# Patient Record
(demographics unavailable — no encounter records)

---

## 2016-12-28 NOTE — EMERGENCY ROOM REPORT
History of Present Illness


General


Chief Complaint:  shortness of breath


Source:  Family Member





Present Illness


HPI


Patient is a 86-year-old female who presented after increased fever difficulty 

breathing.  Patient gradual onset of symptoms of the past 2 hours.  Patient had 

a productive cough.  Patient recently been discharged from the hospital after 

pneumonia.  Patient prior history of IVC filter placement.  She's not currently 

taking anticoagulation.  Patient was noted to have increased difficulty 

breathing was brought back to the hospital.Per family she does not have a 

primary care Dr.Cough is reportedly productive nature.


Allergies:  


Coded Allergies:  


     No Known Allergies (Unverified , 10/4/16)





Patient History


Reviewed Nursing Documentation:  PMH: Agreed, PSxH: Agreed





Nursing Documentation-PMH


Hx Cardiac Problems:  Yes


Hx Hypertension:  Yes


Hx Cancer:  No


Hx Gastrointestinal Problems:  Yes


Hx Neurological Problems:  Yes - ANEURYSM BRAIN


Hx Dementia:  Yes





Review of Systems


All Other Systems:  negative except mentioned in HPI





Physical Exam


General Appearance:  moderate distress, lethargic


ENT:  other - dark fluid in oropharynx


Neck:  supple


Respiratory:  chest non-tender, lungs clear, normal breath sounds


Cardiovascular #1:  regular rate, rhythm, no edema


Gastrointestinal:  non tender, soft, no mass


Musculoskeletal:  back normal, digits/nails normal


Neurologic:  alert, oriented x3, responsive


Psychiatric:  judgement/insight normal


Skin:  normal color, no rash, warm/dry





Medical Decision Making


Diagnostic Impression:  


 Primary Impression:  


 HCAP (healthcare-associated pneumonia)


 Additional Impression:  


 Sepsis


ER Course


Patient presented for shortness of breath.Differential included but was not 

limited to anemia, pneumonia, pneumothorax, myocardial infarction, pericardial 

effusion, congestive heart failure, acidosis.  Because of complexity of patient'

s case laboratory testing and imaging studies were ordered.


The laboratory studies showed normal white blood count as well as normal 

lactate.  Patient appears to have pneumonia.  The patient may have some GI 

bleeding and she did have some dark material in her oropharynx.  Dr. Jermaine Milligan was contacted for inpatient management





Labs








Test


  12/28/16


22:30


 


White Blood Count


  6.9 K/UL


(4.8-10.8)


 


Red Blood Count


  4.68 M/UL


(4.20-5.40)


 


Hemoglobin


  13.6 G/DL


(12.0-16.0)


 


Hematocrit


  43.5 %


(37.0-47.0)


 


Mean Corpuscular Volume 93 FL (80-99) 


 


Mean Corpuscular Hemoglobin


  29.0 PG


(27.0-31.0)


 


Mean Corpuscular Hemoglobin


Concent 31.2 G/DL


(32.0-36.0)


 


Red Cell Distribution Width


  13.6 %


(11.6-14.8)


 


Platelet Count


  149 K/UL


(150-450)


 


Mean Platelet Volume


  9.0 FL


(6.5-10.1)


 


Neutrophils (%) (Auto)


  65.3 %


(45.0-75.0)


 


Lymphocytes (%) (Auto)


  16.9 %


(20.0-45.0)


 


Monocytes (%) (Auto)


  12.9 %


(1.0-10.0)


 


Eosinophils (%) (Auto)


  3.1 %


(0.0-3.0)


 


Basophils (%) (Auto)


  1.8 %


(0.0-2.0)


 


Prothrombin Time


  11.1 SEC


(9.30-11.50)


 


Prothromb Time International


Ratio 1.1 (0.9-1.1) 


 


 


Activated Partial


Thromboplast Time 26 SEC (23-33) 


 


 


Sodium Level


  141 mEQ/L


(135-145)


 


Potassium Level


  4.4 mEQ/L


(3.4-4.9)


 


Chloride Level


  100 mEQ/L


()


 


Carbon Dioxide Level


  29 mEQ/L


(20-30)


 


Anion Gap 12 (5-15) 


 


Blood Urea Nitrogen


  10 mg/dL


(7-23)


 


Creatinine


  0.8 mg/dL


(0.5-0.9)


 


Estimat Glomerular Filtration


Rate  mL/min (>60) 


 


 


Glucose Level


  107 mg/dL


()


 


Lactic Acid Level


  1.10 mmol/L


(0.66-2.22)


 


Calcium Level


  9.1 mg/dL


(8.6-10.2)


 


Total Bilirubin


  0.3 mg/dL


(0.0-1.2)


 


Aspartate Amino Transf


(AST/SGOT) 18 U/L (5-40) 


 


 


Alanine Aminotransferase


(ALT/SGPT) 11 U/L (3-33) 


 


 


Alkaline Phosphatase


  85 U/L


()


 


Total Creatine Kinase


  14 U/L


()


 


Creatine Kinase MB


  < 1.5 ng/mL (<


3.8)


 


Creatine Kinase MB Relative


Index  


 


 


Troponin I


  < 0.30 ng/mL


(<=0.30)


 


Total Protein


  7.2 g/dL


(6.6-8.7)


 


Albumin


  3.7 g/dL


(3.5-5.2)


 


Globulin 3.5 g/dL 


 


Albumin/Globulin Ratio 1.0 (1.0-2.7) 








EKG Diagnostic Results


Rate:  normal


Rhythm:  NSR


ST Segments:  no acute changes





Rhythm Strip Diag. Results


EP Interpretation:  yes


Rhythm:  NSR, no PVC's, no ectopy


Status:  unchanged


Disposition:  ADMITTED AS INPATIENT











GilbertoKendell Dec 28, 2016 21:28

## 2016-12-29 NOTE — CARDIAC ELECTROPHYSIOLOGY PN
Subjective


Subjective


[id number]





Objective





Last 24 Hour Vital Signs








  Date Time  Temp Pulse Resp B/P Pulse Ox O2 Delivery O2 Flow Rate FiO2


 


12/29/16 16:17      Nasal Cannula 2.0 28 12/29/16 16:13 98.2 84 21 135/73 93 Room Air 3.0 


 


12/29/16 16:00  81      


 


12/29/16 12:00 97.7 74 24 141/81 94 Nasal Cannula 2.0 28 12/29/16 10:34 97.7       


 


12/29/16 09:16    143/79    


 


12/29/16 08:00  67      


 


12/29/16 08:00 101.7 82 30 160/91 92 Nasal Cannula 2.0 


 


12/29/16 07:55     97 Nasal Cannula 2.0 28 12/29/16 07:55  76 18   Nasal Cannula 2.0 28 12/29/16 04:38 98.2 75 30 143/79 93 Nasal Cannula 2.0 


 


12/29/16 03:56  72      


 


12/29/16 01:57 98.2 74 28 144/92 94 Nasal Cannula 2.0 


 


12/29/16 01:51  77      


 


12/29/16 01:35 100.1 76 29 133/70 95 Nasal Cannula 2.0 


 


12/29/16 01:34 100.1 76 29 165/84 95 Nasal Cannula 2.0 


 


12/29/16 00:07 100.1 76 29 165/84 95 Nasal Cannula 2.0 


 


12/28/16 23:10  75 34   Nasal Cannula 3.0 


 


12/28/16 23:08 100.1 75 34 153/74 96 Nasal Cannula 3.0 


 


12/28/16 21:18 100.2 84 38 172/74 89 Nasal Cannula 2.0 

















Intake and Output  


 


 12/28/16 12/29/16





 19:00 07:00


 


Intake Total  1100 ml


 


Balance  1100 ml


 


  


 


Intake IV Total  1100 ml


 


# Voids  2


 


# Bowel Movements  1











Laboratory Tests








Test


  12/28/16


22:30 12/29/16


16:15


 


White Blood Count


  6.9 K/UL


(4.8-10.8) 


 


 


Red Blood Count


  4.68 M/UL


(4.20-5.40) 


 


 


Hemoglobin


  13.6 G/DL


(12.0-16.0) 


 


 


Hematocrit


  43.5 %


(37.0-47.0) 


 


 


Mean Corpuscular Volume 93 FL (80-99)   


 


Mean Corpuscular Hemoglobin


  29.0 PG


(27.0-31.0) 


 


 


Mean Corpuscular Hemoglobin


Concent 31.2 G/DL


(32.0-36.0)  L 


 


 


Red Cell Distribution Width


  13.6 %


(11.6-14.8) 


 


 


Platelet Count


  149 K/UL


(150-450)  L 


 


 


Mean Platelet Volume


  9.0 FL


(6.5-10.1) 


 


 


Neutrophils (%) (Auto)


  65.3 %


(45.0-75.0) 


 


 


Lymphocytes (%) (Auto)


  16.9 %


(20.0-45.0)  L 


 


 


Monocytes (%) (Auto)


  12.9 %


(1.0-10.0)  H 


 


 


Eosinophils (%) (Auto)


  3.1 %


(0.0-3.0)  H 


 


 


Basophils (%) (Auto)


  1.8 %


(0.0-2.0) 


 


 


Prothrombin Time


  11.1 SEC


(9.30-11.50) 


 


 


Prothromb Time International


Ratio 1.1 (0.9-1.1)  


  


 


 


Activated Partial


Thromboplast Time 26 SEC (23-33)


  


 


 


Sodium Level


  141 mEQ/L


(135-145) 


 


 


Potassium Level


  4.4 mEQ/L


(3.4-4.9) 


 


 


Chloride Level


  100 mEQ/L


() 


 


 


Carbon Dioxide Level


  29 mEQ/L


(20-30) 


 


 


Anion Gap 12 (5-15)   


 


Blood Urea Nitrogen


  10 mg/dL


(7-23) 


 


 


Creatinine


  0.8 mg/dL


(0.5-0.9) 


 


 


Estimat Glomerular Filtration


Rate  mL/min (>60)  


  


 


 


Glucose Level


  107 mg/dL


()  H 


 


 


Lactic Acid Level


  1.10 mmol/L


(0.66-2.22) 


 


 


Calcium Level


  9.1 mg/dL


(8.6-10.2) 


 


 


Total Bilirubin


  0.3 mg/dL


(0.0-1.2) 


 


 


Aspartate Amino Transf


(AST/SGOT) 18 U/L (5-40)  


  


 


 


Alanine Aminotransferase


(ALT/SGPT) 11 U/L (3-33)  


  


 


 


Alkaline Phosphatase


  85 U/L


() 


 


 


Total Creatine Kinase


  14 U/L


()  L 


 


 


Creatine Kinase MB


  < 1.5 ng/mL (<


3.8) 


 


 


Creatine Kinase MB Relative


Index   


  


 


 


Troponin I


  < 0.30 ng/mL


(<=0.30) 


 


 


Total Protein


  7.2 g/dL


(6.6-8.7) 


 


 


Albumin


  3.7 g/dL


(3.5-5.2) 


 


 


Globulin 3.5 g/dL   


 


Albumin/Globulin Ratio 1.0 (1.0-2.7)   


 


D-Dimer


  


  5730 ng/mL


(<500)  H


 


Pro-B-Type Natriuretic Peptide


  


  719 pg/mL


(0-450)  H

















MALCOLM ZULUAGA Dec 29, 2016 18:12

## 2016-12-29 NOTE — PULMONOLOGY PROGRESS NOTE
Assessment/Plan


Problems:  


(1) HCAP (healthcare-associated pneumonia)


(2) Alzheimer's dementia


(3) HTN (hypertension)


(4) DVT (deep venous thrombosis)


Assessment/Plan


-Optimize pulmonary hygiene/mobilize as tolerated


-Titrate down FiO2 to keep SaO2 > 90%


-PRN nebs


-Continue abx per ID


-F/U Cx and viral panel


-F/U PA/LAT CXR


-Check D-dimer and BNP


-Has IVC filter, continue Hep SQ


-SLP eval, aspiration precautions


-Monitor volumes


-Full code





Subjective


Allergies:  


Coded Allergies:  


     No Known Allergies (Unverified , 10/4/16)


Subjective


PCCM CONSULT





86 F NHR h/o dementia, ? aneurysm, recent DVT S/P IVC f BIB family with several 

days of cough and low grade fevers.  Tm 101.8, no WCt, on 1-2L O2. Cough 

productive of mucoid phlegm, ? had CXR in ED. Got Unasyn in ED, now on CTX per 

ID.  Jeffery puree diet, no N/V/D/C/abdominal pain or urinary complaints.  No H/O 

lung dz





PMH: Dementia, DVT S/P IVC filter, HTN, ? aneurysm, GERD





PTA MEDS: Reviewed





SHx: No T/E/D use





FHx: N/C





ROS: Negative other than HPI





Objective





Last 24 Hour Vital Signs








  Date Time  Temp Pulse Resp B/P Pulse Ox O2 Delivery O2 Flow Rate FiO2


 


12/29/16 12:00 97.7 74 24 141/81 94 Nasal Cannula 2.0 28


 


12/29/16 09:16    143/79    


 


12/29/16 08:00  67      


 


12/29/16 08:00 101.7 82 30 160/91 92 Nasal Cannula 2.0 


 


12/29/16 07:55     97 Nasal Cannula 2.0 28


 


12/29/16 07:55  76 18   Nasal Cannula 2.0 28 12/29/16 04:38 98.2 75 30 143/79 93 Nasal Cannula 2.0 


 


12/29/16 03:56  72      


 


12/29/16 01:57 98.2 74 28 144/92 94 Nasal Cannula 2.0 


 


12/29/16 01:51  77      


 


12/29/16 01:35 100.1 76 29 133/70 95 Nasal Cannula 2.0 


 


12/29/16 01:34 100.1 76 29 165/84 95 Nasal Cannula 2.0 


 


12/29/16 00:07 100.1 76 29 165/84 95 Nasal Cannula 2.0 


 


12/28/16 23:10  75 34   Nasal Cannula 3.0 


 


12/28/16 23:08 100.1 75 34 153/74 96 Nasal Cannula 3.0 


 


12/28/16 21:18 100.2 84 38 172/74 89 Nasal Cannula 2.0 

















Intake and Output  


 


 12/28/16 12/29/16





 19:00 07:00


 


Intake Total  1100 ml


 


Balance  1100 ml


 


  


 


Intake IV Total  1100 ml


 


# Voids  2


 


# Bowel Movements  1








General Appearance:  no acute distress, other - demented female


HEENT:  normocephalic, atraumatic, mucous membranes moist


Respiratory/Chest:  rhonchi - scattered


Cardiovascular:  normal peripheral pulses, normal rate, regular rhythm


Abdomen:  normal bowel sounds, soft, non tender, no organomegaly, non distended


Extremities:  no cyanosis, no clubbing, no edema


Laboratory Tests


12/28/16 22:30: 


White Blood Count 6.9, Red Blood Count 4.68, Hemoglobin 13.6, Hematocrit 43.5, 

Mean Corpuscular Volume 93, Mean Corpuscular Hemoglobin 29.0, Mean Corpuscular 

Hemoglobin Concent 31.2L, Red Cell Distribution Width 13.6, Platelet Count 149L

, Mean Platelet Volume 9.0, Neutrophils (%) (Auto) 65.3, Lymphocytes (%) (Auto) 

16.9L, Monocytes (%) (Auto) 12.9H, Eosinophils (%) (Auto) 3.1H, Basophils (%) (

Auto) 1.8, Prothrombin Time 11.1, Prothromb Time International Ratio 1.1, 

Activated Partial Thromboplast Time 26, Sodium Level 141, Potassium Level 4.4, 

Chloride Level 100, Carbon Dioxide Level 29, Anion Gap 12, Blood Urea Nitrogen 

10, Creatinine 0.8, Estimat Glomerular Filtration Rate , Glucose Level 107H, 

Lactic Acid Level 1.10, Calcium Level 9.1, Total Bilirubin 0.3, Aspartate Amino 

Transf (AST/SGOT) 18, Alanine Aminotransferase (ALT/SGPT) 11, Alkaline 

Phosphatase 85, Total Creatine Kinase 14L, Creatine Kinase MB < 1.5, Creatine 

Kinase MB Relative Index , Troponin I < 0.30, Total Protein 7.2, Albumin 3.7, 

Globulin 3.5, Albumin/Globulin Ratio 1.0





Current Medications








 Medications


  (Trade)  Dose


 Ordered  Sig/Toribio


 Route


 PRN Reason  Start Time


 Stop Time Status Last Admin


Dose Admin


 


 Acetaminophen 650


 mg  650 mg  Q4H  PRN


 RECTAL


 Fever/Headache/Mild Pain  12/29/16 09:30


 1/28/17 09:29  12/29/16 10:04


 


 


 Acetaminophen/


 Hydrocodone Bitart


  (Norco 10/325)  1 ea  Q6H  PRN


 ORAL


 Pain Scale (6-10)  12/29/16 01:45


 1/5/17 01:44   


 


 


 Albuterol/


 Ipratropium


  (DuoNeb


 0.5-3(2.5)mg/3ml)  3 ml  Q4H  PRN


 HHN


 Shortness of Breath  12/29/16 01:45


 1/3/17 01:44   


 


 


 Ceftriaxone


 Sodium/Dextrose


  (Rocephin/D5W


 50ml)  50 ml @ 


 100 mls/hr  Q24H


 IVPB


   12/29/16 14:00


 1/5/17 13:59   


 


 


 Clonidine HCl


  (Catapres)  0.1 mg  Q6H  PRN


 ORAL


 For High Blood Pressure  12/29/16 01:45


 1/28/17 01:44   


 


 


 Dextrose


  (Dextrose 50%)    STAT  PRN


 IV


 Hypoglycemia  12/29/16 01:45


 1/28/17 01:44   


 


 


 Diphenhydramine


 HCl


  (Benadryl)  25 mg  Q6H  PRN


 ORAL


 Itching/Pruritis  12/29/16 01:45


 1/28/17 01:44   


 


 


 Docusate Sodium


  (Colace)  100 mg  DAILY


 ORAL


   12/29/16 09:00


 1/28/17 08:59  12/29/16 09:16


 


 


 Heparin Sodium


  (Porcine)


  (Heparin 5000


 units/ml)  5,000 units  EVERY 12  HOURS


 SUBQ


   12/29/16 09:00


 1/28/17 08:59  12/29/16 09:18


 


 


 Lorazepam


  (Ativan 2mg/ml


 1ml)  1 mg  Q4H  PRN


 IV


 For Anxiety  12/29/16 13:45


 1/5/17 13:44  12/29/16 13:59


 


 


 Lorazepam


  (Ativan)  1 mg  Q8H  PRN


 ORAL


 For Anxiety  12/29/16 01:45


 1/5/17 01:44   


 


 


 Losartan Potassium


  (Cozaar)  50 mg  Q12HR


 ORAL


   12/29/16 09:00


 1/28/17 08:59  12/29/16 09:16


 


 


 Pantoprazole


  (Protonix)  40 mg  Q12H  PRN


 ORAL


 Abdominal cramps  12/29/16 01:45


 1/28/17 01:44   


 

















SEJAL DEJESUS M.D. Dec 29, 2016 14:57

## 2016-12-29 NOTE — WOUND CARE CONSULTATION
Wound Assessment


Wound Assessment #1:  


   Wound Number:  #1


   Wound Present on Admission:  Yes


   New Wound:  No


   Status Change of Wound:  No


   Wound Location Body Site:  perineal area - extending to perianal


   Wound Type:  chemical burn


   Gagan Test:  Does not Gagan


   Percent of Wound Pink/Red:  100


   Wound Drainage Amount:  None


   Wound Drainage Odor:  None/Absent


   Tissue Surrounding Wound:  Macerated


   Wound General Appearance:  Reddened, Open to air


Wound Assessment #2:  


   Wound Number:  #2


   Wound Present on Admission:  Yes


   New Wound:  No


   Status Change of Wound:  No


   Wound Location Body Site Modif:  right


   Wound Location Body Site:  buttocks


   Wound Type:  pressure ulcer


   Gagan Test:  Does not Gagan


   Pressure Ulcer Stage:  deep tissue injury


   Wound Thickness:  Full Thickness


   Wound Length:  1.0


   Wound Width:  1.0


   Wound Depth:  utd


   Percent of Wound Purple/Maroon:  100


   Wound Drainage Amount:  None


   Wound Drainage Odor:  None/Absent


   Tissue Surrounding Wound:  Erythemic


   Wound General Appearance:  Reddened


Wound Assessment #3:  


   Wound Number:  #3


   Wound Present on Admission:  Yes


   New Wound:  No


   Status Change of Wound:  No


   Wound Location Body Site Modif:  mid


   Wound Location Body Site:  sacral


   Wound Type:  pressure ulcer


   Gagan Test:  Does not Gagan


   Pressure Ulcer Stage:  I


   Wound Thickness:  Partial Thickness


   Wound Length:  4.0


   Wound Width:  4.0


   Percent of Wound Pink/Red:  100


   Wound Drainage Amount:  None


   Wound Drainage Odor:  None/Absent


   Tissue Surrounding Wound:  Erythemic


   Wound General Appearance:  Reddened


Wound Assessment #4:  


   Wound Number:  #4


   Wound Present on Admission:  Yes


   New Wound:  No


   Status Change of Wound:  No


   Wound Location Body Site Modif:  left


   Wound Location Body Site:  thigh - upper inner


   Wound Type:  chemical burn


   Gagan Test:  Does not Gagan


   Percent of Wound Pink/Red:  100


   Wound Drainage Amount:  None


   Wound Drainage Odor:  None/Absent


   Tissue Surrounding Wound:  Macerated


   Wound General Appearance:  Reddened


Wound Assessment #5:  


   Wound Present on Admission:  Yes


   New Wound:  No


   Status Change of Wound:  No


   Wound Location Body Site Modif:  right


   Wound Location Body Site:  thigh - upper inner


   Wound Type:  pressure ulcer


   Gagan Test:  Does not Gagan


   Percent of Wound Pink/Red:  100


   Wound Drainage Amount:  None


   Wound Drainage Odor:  None/Absent


   Tissue Surrounding Wound:  Macerated


   Wound General Appearance:  Reddened


Wound Comment


#1 Perineal area extending to perianal chemical burn.


#2 Right inner thigh Chemical Burn.


#3 Left inner thigh Chemical Burn.


#4 Mid Sacral Pressure Ulcer stage I.


#5 Right Buttock Pressure Ulcer Deep Tissue Injury.





Recommendation.


-Local wound care as ordered.


-Low air loss Overlay.


-Keep Clean and dry.


-Gentle pericare as needed.


-Turn and reposition.


-Assess and notify MD if any changes are noted.


-Optimize Nutrition.











VEDA FLOWERS Dec 29, 2016 10:26

## 2016-12-29 NOTE — DIAGNOSTIC IMAGING REPORT
Indication: COPD, shortness of breath



Technique: One view of the chest



Comparison: 12/28/2016



Findings: Bilateral chronic appearing interstitial disease is again demonstrated. No

new infiltrates. Heart size is normal. Aorta is tortuous and calcified. Findings 

are

unchanged



Impression:  Unchanged, over one day, findings as above.

## 2016-12-29 NOTE — HISTORY AND PHYSICAL
History of Present Illness


General


Date patient seen:  Dec 29, 2016


Reason for Hospitalization:  Dyspnea/Respdistress





Present Illness


HPI


The patient is unable to provide any history at this time due to her mental 

state, sitter at bedside who reported pt being agitated throughout the day, 

history is therefore obtained from ED record, according to record, patient is 

an 86-year-old female, who presented from home with fever, difficulty breathing 

and productive cough.  Patient was recently discharged from the hospital after 

being treated for pneumonia.  History includes bilateral DVTs of the lower 

extremities of the popliteal veins, status post IVC filter placement, most 

recently had been on Coumadin, cataract surgery, dysphagia,CVA, brain aneurysm, 

dementia, HTN, depression.  Not currently on anticoagulant.


Allergies:  


Coded Allergies:  


     No Known Allergies (Unverified , 10/4/16)





Medication History


Scheduled


Cephalexin* (Cephalexin*), 500 MG ORAL BID, (Reported)


Docusate Sodium* (Colace*), 100 MG ORAL DAILY, (Reported)


Losartan Potassium* (Losartan Potassium*), 50 MG ORAL Q12HR, (Reported)


Potassium Chloride (Potassium Chloride), 20 MEQ PO BID, (Reported)





Scheduled PRN


Acetaminophen* (Tylenol Extra Strength*), 650 MG ORAL Q4HR PRN for Mild Pain (

Pain Scale 1-3), (Reported)


Albuterol Sulfate* (Albuterol Sulfate Hhn*), 3 ML INH Q4H PRN for Shortness of 

Breath, (Reported)


Clonidine Hcl (Clonidine Hcl), 0.1 MG PO PRN PRN for For High Blood Pressure, (

Reported)


Diphenhydramine HCl (Benadryl), 25 MG PO PRN PRN for Itching/Pruritis, (Reported

)


Hydrocodone Bit/Acetaminophen * (Norco *), 1 TAB ORAL Q8HR PRN for 

Pain Scale (6-10), (Reported)


Lorazepam* (Ativan*), 1 MG ORAL Q8HR PRN for For Anxiety, (Reported)


Pantoprazole* (Protonix*), 40 MG ORAL Q12HR PRN for Abdominal cramps, (Reported)





Patient History


Limited by:  medical condition


Healthcare decision maker


Zoe Cunha (daughter)


Resuscitation status


Full Code


Advanced Directive on File


No





Past Medical/Surgical History


Past Medical/Surgical History:  


(1) History of cataract surgery


(2) Dysphagia


(3) CVA (cerebral vascular accident)


(4) Brain aneurysm


(5) Depression


(6) HTN (hypertension)


(7) Alzheimer's dementia


(8) HCAP (healthcare-associated pneumonia)





Family History


Family History:  


Patient reports no known family medical history.





Social History


Social History:  


(1) Non-smoker


(2) No history of alcohol use


(3) No illicit drug use





Review of Systems


All Other Systems:  negative except mentioned in HPI





Physical Exam


General Appearance:  alert, confused


Lines, tubes and drains:  peripheral


HEENT:  normocephalic, atraumatic


Neck:  normal alignment, supple, normal inspection


Respiratory/Chest:  decreased breath sounds


Cardiovascular/Chest:  normal rate, regular rhythm, no JVD


Abdomen:  non tender, soft, no organomegaly, no mass


Extremities:  normal range of motion, non-tender, normal inspection, no calf 

tenderness


Skin Exam:  normal pigmentation, warm/dry


Neurologic:  alert, disoriented





Last 24 Hour Vital Signs








  Date Time  Temp Pulse Resp B/P Pulse Ox O2 Delivery O2 Flow Rate FiO2


 


12/29/16 18:56     95 Nasal Cannula 2.0 28 12/29/16 18:56  88 18   Nasal Cannula 2.0 28 12/29/16 18:56      Nasal Cannula 2.0 28 12/29/16 16:17      Nasal Cannula 2.0 28 12/29/16 16:13 98.2 84 21 135/73 93 Room Air 3.0 


 


12/29/16 16:00  81      


 


12/29/16 12:00 97.7 74 24 141/81 94 Nasal Cannula 2.0 28 12/29/16 10:34 97.7       


 


12/29/16 09:16    143/79    


 


12/29/16 08:00  67      


 


12/29/16 08:00 101.7 82 30 160/91 92 Nasal Cannula 2.0 


 


12/29/16 07:55     97 Nasal Cannula 2.0 28 12/29/16 07:55  76 18   Nasal Cannula 2.0 28 12/29/16 04:38 98.2 75 30 143/79 93 Nasal Cannula 2.0 


 


12/29/16 03:56  72      


 


12/29/16 01:57 98.2 74 28 144/92 94 Nasal Cannula 2.0 


 


12/29/16 01:51  77      


 


12/29/16 01:35 100.1 76 29 133/70 95 Nasal Cannula 2.0 


 


12/29/16 01:34 100.1 76 29 165/84 95 Nasal Cannula 2.0 


 


12/29/16 00:07 100.1 76 29 165/84 95 Nasal Cannula 2.0 


 


12/28/16 23:10  75 34   Nasal Cannula 3.0 


 


12/28/16 23:08 100.1 75 34 153/74 96 Nasal Cannula 3.0 


 


12/28/16 21:18 100.2 84 38 172/74 89 Nasal Cannula 2.0 

















Intake and Output  


 


 12/28/16 12/29/16





 19:00 07:00


 


Intake Total  1100 ml


 


Balance  1100 ml


 


  


 


IV Total  1100 ml


 


# Voids  2


 


# Bowel Movements  1











Laboratory Tests








Test


  12/28/16


22:30 12/29/16


16:15


 


White Blood Count


  6.9 K/UL


(4.8-10.8) 


 


 


Red Blood Count


  4.68 M/UL


(4.20-5.40) 


 


 


Hemoglobin


  13.6 G/DL


(12.0-16.0) 


 


 


Hematocrit


  43.5 %


(37.0-47.0) 


 


 


Mean Corpuscular Volume 93 FL (80-99)   


 


Mean Corpuscular Hemoglobin


  29.0 PG


(27.0-31.0) 


 


 


Mean Corpuscular Hemoglobin


Concent 31.2 G/DL


(32.0-36.0)  L 


 


 


Red Cell Distribution Width


  13.6 %


(11.6-14.8) 


 


 


Platelet Count


  149 K/UL


(150-450)  L 


 


 


Mean Platelet Volume


  9.0 FL


(6.5-10.1) 


 


 


Neutrophils (%) (Auto)


  65.3 %


(45.0-75.0) 


 


 


Lymphocytes (%) (Auto)


  16.9 %


(20.0-45.0)  L 


 


 


Monocytes (%) (Auto)


  12.9 %


(1.0-10.0)  H 


 


 


Eosinophils (%) (Auto)


  3.1 %


(0.0-3.0)  H 


 


 


Basophils (%) (Auto)


  1.8 %


(0.0-2.0) 


 


 


Prothrombin Time


  11.1 SEC


(9.30-11.50) 


 


 


Prothromb Time International


Ratio 1.1 (0.9-1.1)  


  


 


 


Activated Partial


Thromboplast Time 26 SEC (23-33)


  


 


 


Sodium Level


  141 mEQ/L


(135-145) 


 


 


Potassium Level


  4.4 mEQ/L


(3.4-4.9) 


 


 


Chloride Level


  100 mEQ/L


() 


 


 


Carbon Dioxide Level


  29 mEQ/L


(20-30) 


 


 


Anion Gap 12 (5-15)   


 


Blood Urea Nitrogen


  10 mg/dL


(7-23) 


 


 


Creatinine


  0.8 mg/dL


(0.5-0.9) 


 


 


Estimat Glomerular Filtration


Rate  mL/min (>60)  


  


 


 


Glucose Level


  107 mg/dL


()  H 


 


 


Lactic Acid Level


  1.10 mmol/L


(0.66-2.22) 


 


 


Calcium Level


  9.1 mg/dL


(8.6-10.2) 


 


 


Total Bilirubin


  0.3 mg/dL


(0.0-1.2) 


 


 


Aspartate Amino Transf


(AST/SGOT) 18 U/L (5-40)  


  


 


 


Alanine Aminotransferase


(ALT/SGPT) 11 U/L (3-33)  


  


 


 


Alkaline Phosphatase


  85 U/L


() 


 


 


Total Creatine Kinase


  14 U/L


()  L 


 


 


Creatine Kinase MB


  < 1.5 ng/mL (<


3.8) 


 


 


Creatine Kinase MB Relative


Index   


  


 


 


Troponin I


  < 0.30 ng/mL


(<=0.30) 


 


 


Total Protein


  7.2 g/dL


(6.6-8.7) 


 


 


Albumin


  3.7 g/dL


(3.5-5.2) 


 


 


Globulin 3.5 g/dL   


 


Albumin/Globulin Ratio 1.0 (1.0-2.7)   


 


D-Dimer


  


  5730 ng/mL


(<500)  H


 


Pro-B-Type Natriuretic Peptide


  


  719 pg/mL


(0-450)  H








Height (Feet):  5


Height (Inches):  6.00


Weight (Pounds):  140


Medications





Current Medications








 Medications


  (Trade)  Dose


 Ordered  Sig/Toribio


 Route


 PRN Reason  Start Time


 Stop Time Status Last Admin


Dose Admin


 


 Acetaminophen 650


 mg  650 mg  Q4H  PRN


 RECTAL


 Fever/Headache/Mild Pain  12/29/16 09:30


 1/28/17 09:29  12/29/16 10:04


 


 


 Acetaminophen/


 Hydrocodone Bitart


  (Norco 10/325)  1 ea  Q6H  PRN


 ORAL


 Pain Scale (6-10)  12/29/16 01:45


 1/5/17 01:44   


 


 


 Albuterol/


 Ipratropium


  (DuoNeb


 0.5-3(2.5)mg/3ml)  3 ml  Q4H  PRN


 HHN


 Shortness of Breath  12/29/16 01:45


 1/3/17 01:44   


 


 


 Ceftriaxone


 Sodium/Dextrose


  (Rocephin/D5W


 50ml)  50 ml @ 


 100 mls/hr  Q24H


 IVPB


   12/29/16 14:00


 1/5/17 13:59  12/29/16 15:15


 


 


 Clonidine HCl


  (Catapres)  0.1 mg  Q6H  PRN


 ORAL


 For High Blood Pressure  12/29/16 01:45


 1/28/17 01:44   


 


 


 Dextrose


  (Dextrose 50%)    STAT  PRN


 IV


 Hypoglycemia  12/29/16 01:45


 1/28/17 01:44   


 


 


 Diphenhydramine


 HCl


  (Benadryl)  25 mg  Q6H  PRN


 ORAL


 Itching/Pruritis  12/29/16 01:45


 1/28/17 01:44   


 


 


 Docusate Sodium


  (Colace)  100 mg  DAILY


 ORAL


   12/29/16 09:00


 1/28/17 08:59  12/29/16 09:16


 


 


 Guaifenesin


  (Mucinex)  600 mg  TWICE A  DAY


 ORAL


   12/29/16 18:00


 1/28/17 17:59  12/29/16 17:26


 


 


 Guaifenesin


  (Robitussin)  100 mg  Q4H  PRN


 ORAL


 For Cough  12/29/16 15:00


 1/28/17 14:59   


 


 


 Heparin Sodium


  (Porcine)


  (Heparin 5000


 units/ml)  5,000 units  EVERY 12  HOURS


 SUBQ


   12/29/16 09:00


 1/28/17 08:59  12/29/16 09:18


 


 


 Lorazepam


  (Ativan 2mg/ml


 1ml)  1 mg  Q4H  PRN


 IV


 For Anxiety  12/29/16 13:45


 1/5/17 13:44  12/29/16 18:56


 


 


 Lorazepam


  (Ativan)  1 mg  Q8H  PRN


 ORAL


 For Anxiety  12/29/16 01:45


 1/5/17 01:44   


 


 


 Losartan Potassium


  (Cozaar)  50 mg  Q12HR


 ORAL


   12/29/16 09:00


 1/28/17 08:59  12/29/16 09:16


 


 


 Pantoprazole


  (Protonix)  40 mg  Q12H  PRN


 ORAL


 Abdominal cramps  12/29/16 01:45


 1/28/17 01:44   


 








Objective Narrative


XRAY Chest 1v





Indication: COPD, shortness of breath





Technique: One view of the chest





Comparison: 12/28/2016





Findings: Bilateral chronic appearing interstitial disease is again 

demonstrated. No


new infiltrates. Heart size is normal. Aorta is tortuous and calcified. 

Findings 


are


unchanged





Impression:  Unchanged, over one day





Assessment/Plan


Problem List:  


(1) Dyspnea


ICD Codes:  R06.00 - Dyspnea, unspecified


SNOMED:  364868104


(2) Respiratory distress


ICD Codes:  R06.00 - Dyspnea, unspecified


SNOMED:  246141677


(3) Congestive heart failure (CHF)


ICD Codes:  I50.9 - Heart failure, unspecified


SNOMED:  83389705


Assessment/Plan


Pulmonary hygiene/mobilize per pulmo


Continue neb prn and abx


Continue abx per ID


Hematology consult, will follow up with rec


Cardiology consult


DVT prophylaxis with Hep SQ


Aspiration precautions


Monitor volumes


Chest CT angiogram to rule out pulmonary embolism 

















Alisia Oakes N.P. Dec 29, 2016 21:10

## 2016-12-29 NOTE — CONSULTATION
DATE OF CONSULTATION:



INFECTIOUS DISEASE CONSULTATION



REFERRING PHYSICIAN:  Jermaine Milligan M.D.



REASON FOR CONSULTATION:  Evaluation of the patient for pneumonia,

antibiotic management.



HISTORY OF PRESENT ILLNESS:  The patient is an 86-year-old female

with multiple medical problems as listed below, who was admitted to this

medical center for shortness of breath.



The patient also has been recently admitted to this medical center due

to E. coli and klebsiella urinary tract infection.  The patient overall is

a poor historian.  This admission the patient was admitted for fever and

pneumonia.  Infectious Disease consultation has been requested for further

evaluation of the patient's antibiotic management.



PAST MEDICAL HISTORY:

1. History of cataract surgery.

2. History of dysphagia.

3. History of CVA.

4. History of brain aneurysm.

5. Dementia.

6. Hypertension.

7. History of urinary tract infection.

8. Depression.

9. Anxiety.



MEDICATIONS:  The patient received one dose of intravenous Unasyn in

the emergency room.



ALLERGIES:  No known drug allergies.



FAMILY HISTORY:  Unavailable.



REVIEW OF SYSTEMS:  Limited, much of the information gathered as

mentioned above.



PHYSICAL EXAMINATION:

VITAL SIGNS:  Temperature 101.7 degrees, pulse 86, respiratory rate

18, and blood pressure 160/91.

HEENT:  Mild pale conjunctivae.

NECK:  No lymphadenopathy.

CHEST:  Coarse breathing sounds.

HEART:  S1 and S2.

ABDOMEN:  Soft and nontender.

EXTREMITIES:  No cyanosis.

NEUROLOGIC:  Awake.  Poor historian.



LABORATORY AND DIAGNOSTIC DATA:  White blood cell 6.9, hemoglobin 13,

and platelet 149,000.  Urinalysis 20-30 white blood cells.  BUN 10 and

creatinine 0.8.  ALT, AST, and alkaline phosphatase within normal range.

Blood culture is pending.  Urine is pending.  Chest x-ray and laboratories

pending.



ASSESSMENT:  The patient is an 86-year-old female with multiple

medical problems as listed above, who came to the hospital with shortness

of breath and fever.  Chest x-ray result is pending, however, the

patient's urinalysis showed significant pyuria.  The patient may have

underlying urinary tract infection.  Also underlying pneumonia is a

consideration.  The patient would benefit from empiric antibiotic

treatment till we get further information from cultures.



PLAN:

1. We will start the patient on IV Rocephin.

2. Monitor blood cultures (blood, urine and sputum).

3. Chest x-ray.

4. Monitor CBC and BMP.



Monitor the patient's clinical course and labs and based on those, we

will do further recommendations.



Thank you for this consultation.  I will follow the patient during this

hospitalization.









  ______________________________________________

  Juan Alberto Levy M.D.





DR:  AMINAH

D:  12/29/2016 12:43

T:  12/29/2016 18:03

JOB#:  [id number]

CC:

## 2016-12-30 NOTE — PULMONOLOGY PROGRESS NOTE
Assessment/Plan


Problems:  


(1) HCAP (healthcare-associated pneumonia)


(2) Alzheimer's dementia


(3) HTN (hypertension)


(4) DVT (deep venous thrombosis)


(5) Gram positive bacterial infection


Assessment & Plan:  ? contaminant





Assessment/Plan


-Optimize pulmonary hygiene/mobilize as tolerated


-PRN O2


-PRN nebs


-Continue abx per ID, F/U repeat CX's


-Monitor volumes


-Has IVC filter, continue Hep SQ


-SLP eval, aspiration precautions


-F/U cards recs and TTE


-Full code





Subjective


Allergies:  


Coded Allergies:  


     No Known Allergies (Unverified , 10/4/16)


Subjective


Tm 98.4, VSS, stable on RA 


Cough better, + SOB, no F/C


D-dimer > 5K --> CT-A: No PE + faint b nodularity and GGO's/poor study with 

lots of artifact





Objective





Last 24 Hour Vital Signs








  Date Time  Temp Pulse Resp B/P Pulse Ox O2 Delivery O2 Flow Rate FiO2


 


12/30/16 12:00  90      


 


12/30/16 08:34    148/91    


 


12/30/16 08:00 98.1 90 19 148/91 94 Room Air  


 


12/30/16 08:00  86      


 


12/30/16 04:00  85      


 


12/30/16 04:00 97.3 82 22 138/97 96 Room Air  


 


12/30/16 00:00  80      


 


12/30/16 00:00 97.6 80 22 138/75 96 Room Air  


 


12/29/16 22:17    142/80    


 


12/29/16 20:00 98.4 87 22 142/80 95 Room Air  


 


12/29/16 18:56     95 Nasal Cannula 2.0 28 12/29/16 18:56  88 18   Nasal Cannula 2.0 28 12/29/16 18:56      Nasal Cannula 2.0 28


 


12/29/16 16:17      Nasal Cannula 2.0 28 12/29/16 16:13 98.2 84 21 135/73 93 Room Air 3.0 


 


12/29/16 16:00  81      

















Intake and Output  


 


 12/29/16 12/30/16





 19:00 07:00


 


Intake Total 290 ml 0 ml


 


Output Total  800 ml


 


Balance 290 ml -800 ml


 


  


 


Intake Oral 240 ml 0 ml


 


IV Total 50 ml 


 


Output Urine Total  800 ml


 


# Bowel Movements 2 2








General Appearance:  no acute distress, cachetic


HEENT:  normocephalic, atraumatic, mucous membranes moist


Respiratory/Chest:  chest wall non-tender, rhonchi - scattered


Cardiovascular:  normal peripheral pulses, normal rate, regular rhythm


Abdomen:  normal bowel sounds, soft, non tender, no organomegaly, non distended


Extremities:  no cyanosis, no clubbing, no edema





Microbiology








 Date/Time


Source Procedure


Growth Status


 


 


 12/28/16 22:45


Blood Blood Culture - Preliminary Resulted


 


 12/28/16 22:30


Blood Blood Culture - Preliminary Resulted





 12/29/16 16:30


Sputum Expectorated Gram Stain - Final Resulted


 


 12/29/16 16:30


Sputum Expectorated Sputum Culture - Preliminary Resulted


 


 12/29/16 16:30


Urine,Clean Catch Urine Culture - Preliminary


NO GROWTH Resulted








Laboratory Tests


12/29/16 16:15: 


D-Dimer 5730H, Pro-B-Type Natriuretic Peptide 719H


12/30/16 03:45: 


Pro-B-Type Natriuretic Peptide 642H, White Blood Count 6.1, Red Blood Count 

4.04L, Hemoglobin 12.1, Hematocrit 37.8, Mean Corpuscular Volume 94, Mean 

Corpuscular Hemoglobin 29.9, Mean Corpuscular Hemoglobin Concent 31.9L, Red 

Cell Distribution Width 13.8, Platelet Count 152, Mean Platelet Volume 8.7, 

Neutrophils (%) (Auto) 63.9, Lymphocytes (%) (Auto) 20.9, Monocytes (%) (Auto) 

11.8H, Eosinophils (%) (Auto) 2.3, Basophils (%) (Auto) 1.2, Sodium Level 138, 

Potassium Level 3.7, Chloride Level 98, Carbon Dioxide Level 26, Anion Gap 14, 

Blood Urea Nitrogen 9, Creatinine 0.6, Estimat Glomerular Filtration Rate , 

Glucose Level 86, Calcium Level 8.7, Troponin I < 0.30, Thyroid Stimulating 

Hormone (TSH) 2.200, Free Thyroxine 1.17





Current Medications








 Medications


  (Trade)  Dose


 Ordered  Sig/Toribio


 Route


 PRN Reason  Start Time


 Stop Time Status Last Admin


Dose Admin


 


 Acetaminophen 650


 mg  650 mg  Q4H  PRN


 RECTAL


 Fever/Headache/Mild Pain  12/29/16 09:30


 1/28/17 09:29  12/29/16 10:04


 


 


 Acetaminophen/


 Hydrocodone Bitart


  (Norco 10/325)  1 ea  Q6H  PRN


 ORAL


 Pain Scale (6-10)  12/29/16 01:45


 1/5/17 01:44   


 


 


 Albuterol/


 Ipratropium


  (DuoNeb


 0.5-3(2.5)mg/3ml)  3 ml  Q4H  PRN


 HHN


 Shortness of Breath  12/29/16 01:45


 1/3/17 01:44   


 


 


 Azithromycin/


 Dextrose


  (Zithromax/D5W


 250ml)  250 ml @ 


 250 mls/hr  Q24HRS


 IVPB


   12/30/16 12:00


 1/5/17 12:59 UNV  


 


 


 Ceftriaxone


 Sodium/Dextrose


  (Rocephin/D5W


 50ml)  50 ml @ 


 100 mls/hr  Q24H


 IVPB


   12/29/16 14:00


 1/5/17 13:59  12/29/16 15:15


 


 


 Clonidine HCl


  (Catapres)  0.1 mg  Q6H  PRN


 ORAL


 For High Blood Pressure  12/29/16 01:45


 1/28/17 01:44   


 


 


 Dextrose


  (Dextrose 50%)    STAT  PRN


 IV


 Hypoglycemia  12/29/16 01:45


 1/28/17 01:44   


 


 


 Diphenhydramine


 HCl


  (Benadryl)  25 mg  Q6H  PRN


 ORAL


 Itching/Pruritis  12/29/16 01:45


 1/28/17 01:44   


 


 


 Docusate Sodium


  (Colace)  100 mg  DAILY


 ORAL


   12/29/16 09:00


 1/28/17 08:59  12/30/16 08:34


 


 


 Guaifenesin


  (Mucinex)  600 mg  TWICE A  DAY


 ORAL


   12/29/16 18:00


 1/28/17 17:59  12/30/16 08:44


 


 


 Guaifenesin


  (Robitussin)  100 mg  Q4H  PRN


 ORAL


 For Cough  12/29/16 15:00


 1/28/17 14:59   


 


 


 Heparin Sodium


  (Porcine)


  (Heparin 5000


 units/ml)  5,000 units  EVERY 12  HOURS


 SUBQ


   12/29/16 09:00


 1/28/17 08:59  12/30/16 08:46


 


 


 Lorazepam


  (Ativan 2mg/ml


 1ml)  1 mg  Q4H  PRN


 IV


 For Anxiety  12/29/16 13:45


 1/5/17 13:44  12/29/16 18:56


 


 


 Lorazepam


  (Ativan)  1 mg  Q8H  PRN


 ORAL


 For Anxiety  12/29/16 01:45


 1/5/17 01:44   


 


 


 Losartan Potassium


  (Cozaar)  50 mg  Q12HR


 ORAL


   12/29/16 09:00


 1/28/17 08:59  12/30/16 08:34


 


 


 Pantoprazole


  (Protonix)  40 mg  Q12H  PRN


 ORAL


 Abdominal cramps  12/29/16 01:45


 1/28/17 01:44   


 


 


 Vancomycin HCl 1


 ea  1 ea  DAILY  PRN


 MISC


 Per rx protocol  12/30/16 12:00


 1/29/17 11:59 SEJAL CHANDRA M.D. Dec 30, 2016 12:52

## 2016-12-30 NOTE — DIAGNOSTIC IMAGING REPORT
ndication: Shortness of breath



Technique: IV administration nonionic contrast. Spiral acquisitions obtained from

the lung bases to the lung apices. Multiplanar and 3-D reconstructions were

generated. Total dose length product 574 mGycm. CTDIvol(s) 12, 12, 12, 12, 12, 12,

17 mGy



Comparison: None



Findings: Exam is limited due to motion artifact. Pulmonary arterial opacification

is adequate. No definite evidence of acute pulmonary embolus demonstrated. No

evidence of thoracic aortic aneurysm or dissection. Normal caliber pulmonary

arteries. Normal heart, no evidence of right ventricular chamber dilatation.



Motion artifact limits assessment of the pulmonary parenchyma. There is 

diffuse

groundglass opacity which is probably exaggerated by the motion artifact. Is there

is bilateral diffuse mild centrilobular interstitial septal thickening, especially

in the lower lobes. There may be some centrilobular micro-nodularity as well 

Some

atelectatic bands are seen at the left lung base. Is questionably a cystic space in

the superior segment of the right lower lobe. No focal dense consolidation 

is

demonstrated. No effusions. A calcified granulomata is seen in the superior segment

of the right lower lobe. There are also calcified granulomatous right hilar 

and

paratracheal lymph nodes.



The heart is normal in size, although there is suggestion of left ventricular

muscular hypertrophy. There are prominent mediastinal lymph nodes, with large 

right

paratracheal node measuring up to 22 mm in diameter. The included thyroid 

is

unremarkable. No axillary or chest wall mass or adenopathy is demonstrated.



There is a mild wedge compression fracture deformity of the T12 vertebral body. 

The

included upper abdominal anatomy is unremarkable.



Impression: Somewhat limited exam, due to respiratory motion artifact. Negative for

acute pulmonary embolus or other acute thoracic vascular pathology



Diffuse ground glass opacity, possibly due to respiratory motion artifact, but

diffuse parenchymal disease such as pulmonary edema not excludable. Correlate 

with

clinical findings



Diffuse bilateral interstitial disease, possibly with micro-nodularity, 

disseminated

inflammatory lesions a possibility.



Evidence of old granulomatous disease



T12 compression fracture deformity, age indeterminate but suspect chronic



This agrees with the preliminary interpretation provided overnight by Dr. Devlin







The CT scanner at [facility] is accredited by the [employer] 

of

Radiology and the scans are performed using protocols designed to limit 

radiation

exposure to as low as reasonably achievable to attain images of sufficient

resolution adequate for diagnostic evaluation.

## 2016-12-30 NOTE — GENERAL PROGRESS NOTE
Assessment/Plan


Problem List:  


(1) Dyspnea


ICD Codes:  R06.00 - Dyspnea, unspecified


SNOMED:  298724831


(2) Respiratory distress


ICD Codes:  R06.00 - Dyspnea, unspecified


SNOMED:  806564087


(3) Congestive heart failure (CHF)


ICD Codes:  I50.9 - Heart failure, unspecified


SNOMED:  23723937


(4) HCAP (healthcare-associated pneumonia)


ICD Codes:  J18.9 - Pneumonia, unspecified organism


SNOMED:  257935763


Status:  stable, progressing


Assessment/Plan


Pulmonary hygiene/mobilize as tolerated per pulmo


Continue abx per ID


Hematology F/U


Has IVC filter, continue lovenox SQ


Aspiration precautions


AM labs





Subjective


ROS Limited/Unobtainable:  Yes


Allergies:  


Coded Allergies:  


     No Known Allergies (Unverified , 10/4/16)


Subjective


Pt's daughter at bedside upset that the pt was discharged last visit without a 

chest xray, she  stated that she had to bring the pt back to the hospital when 

she could not stop coughing at home and was short of breath, stated that she 

was very disappointed with the care





Objective





Last 24 Hour Vital Signs








  Date Time  Temp Pulse Resp B/P Pulse Ox O2 Delivery O2 Flow Rate FiO2


 


12/30/16 16:00 98.6 92 14 138/85 95   


 


12/30/16 16:00  89      


 


12/30/16 12:00 98.2 86 20 130/74 94 Room Air  


 


12/30/16 12:00  90      


 


12/30/16 08:34    148/91    


 


12/30/16 08:00 98.1 90 19 148/91 94 Room Air  


 


12/30/16 08:00  86      


 


12/30/16 06:55     93 Nasal Cannula 2.0 28


 


12/30/16 06:55      Nasal Cannula 2.0 28


 


12/30/16 06:55  94 20   Nasal Cannula 2.0 28


 


12/30/16 04:00  85      


 


12/30/16 04:00 97.3 82 22 138/97 96 Room Air  


 


12/30/16 00:00  80      


 


12/30/16 00:00 97.6 80 22 138/75 96 Room Air  


 


12/29/16 22:17    142/80    


 


12/29/16 20:00 98.4 87 22 142/80 95 Room Air  


 


12/29/16 18:56     95 Nasal Cannula 2.0 28


 


12/29/16 18:56  88 18   Nasal Cannula 2.0 28


 


12/29/16 18:56      Nasal Cannula 2.0 28

















Intake and Output  


 


 12/29/16 12/30/16





 19:00 07:00


 


Intake Total 290 ml 0 ml


 


Output Total  800 ml


 


Balance 290 ml -800 ml


 


  


 


Intake Oral 240 ml 0 ml


 


IV Total 50 ml 


 


Output Urine Total  800 ml


 


# Bowel Movements 2 2








Laboratory Tests


12/30/16 03:45: 


White Blood Count 6.1, Red Blood Count 4.04L, Hemoglobin 12.1, Hematocrit 37.8, 

Mean Corpuscular Volume 94, Mean Corpuscular Hemoglobin 29.9, Mean Corpuscular 

Hemoglobin Concent 31.9L, Red Cell Distribution Width 13.8, Platelet Count 152, 

Mean Platelet Volume 8.7, Neutrophils (%) (Auto) 63.9, Lymphocytes (%) (Auto) 

20.9, Monocytes (%) (Auto) 11.8H, Eosinophils (%) (Auto) 2.3, Basophils (%) (

Auto) 1.2, Sodium Level 138, Potassium Level 3.7, Chloride Level 98, Carbon 

Dioxide Level 26, Anion Gap 14, Blood Urea Nitrogen 9, Creatinine 0.6, Estimat 

Glomerular Filtration Rate , Glucose Level 86, Calcium Level 8.7, Troponin I < 

0.30, Pro-B-Type Natriuretic Peptide 642H, Thyroid Stimulating Hormone (TSH) 

2.200, Free Thyroxine 1.17


Height (Feet):  5


Height (Inches):  6.00


Weight (Pounds):  140


General Appearance:  no apparent distress, confused


EENT:  normal ENT inspection, TMs normal


Neck:  non-tender, normal alignment, supple, normal inspection


Cardiovascular:  normal rate, regular rhythm


Respiratory/Chest:  decreased breath sounds


Abdomen:  normal bowel sounds, non tender, soft, no organomegaly, no mass


Pelvis:  normal external exam


Extremities:  non-tender, normal inspection, no calf tenderness


Neurologic:  disoriented


Skin:  normal pigmentation, warm/dry











Alisia Oakes N.P. Dec 30, 2016 18:26

## 2016-12-30 NOTE — CONSULTATION
DATE OF CONSULTATION:  12/29/2016



CARDIOLOGY CONSULTATION



REASON FOR CONSULTATION:  Evaluation of hypertension and tachycardia,

the patient also is short of breath.



HISTORY OF PRESENT ILLNESS:  The patient is an 86-year-old 

lady with history of hypertension, dementia, and history of DVT status

post IVC filter as well as history of questionable abdominal aortic

aneurysm brought in by family for several days of cough and low-grade

fever.  The patient had temperature of 101.8.  The patient was started on

antibiotic in the emergency room and was admitted to step-down unit for

community-acquired pneumonia.  Cardiology consultation was obtained for

further evaluation and management.  At the time of my evaluation, the

patient is confused, pleasantly but denies any chest pain.



REVIEW OF SYSTEMS:  Negative.



PAST MEDICAL HISTORY:

1. Hypertension.

2. History of brain aneurysm.

3. Dementia.

4. Deep venous thrombosis, status post IVC filter but not on any

anticoagulation.



PHYSICAL EXAMINATION:

VITAL SIGNS:  Blood pressure is 135/73, pulse 84, respirations 20,

and temperature 98.2.

HEAD AND NECK:  Shows no JVD.

LUNGS:  Coarse rhonchi.

CARDIOVASCULAR:  Tachycardic S1 and S2 with no gallop or murmur.

ABDOMEN:  Soft.

EXTREMITIES:  No pitting edema.



LABORATORY DATA:  White count 6.9, hemoglobin 13.6, hematocrit 42.5,

and platelet count 149,000.  Sodium 141, potassium 4.4, BUN of 10,

creatinine 0.8, and glucose of 107.  Troponin is negative.  INR is 1.1.

D-dimer 5730.



ASSESSMENT AND PLAN:

1. Shortness of breath and tachycardia as well as D-dimer more than

5000.  The patient will be undergoing Chest CT angiogram to rule out

pulmonary embolism and was already evaluated by Dr. Heladio Bush.  I

will order an EKG as well as echocardiogram to evaluate for ejection

fraction and wall motion abnormality as well.

2. Hypertension.  Continue Cozaar 50 mg b.i.d. and p.r.n. clonidine.

3. Pneumonia on ceftriaxone and albuterol.



Thank very much, Dr. Milligan, for allowing me to participate in the

care of this patient.  Please do not hesitate to contact for any questions

regarding my evaluation.









  ______________________________________________

  Juan José Saucedo M.D.





DR:  Kedar

D:  12/29/2016 18:14

T:  12/30/2016 00:08

JOB#:  [id number]

CC:

## 2016-12-30 NOTE — CARDIAC ELECTROPHYSIOLOGY PN
Assessment/Plan


Assessment/Plan


1. Shortness of breath and tachycardia as well as D-dimer more than 5000 and 

bilateral LE DVT.  Chest CT angiogram showed no pulmonary embolism  I will 

order an EKG as well as echocardiogram to evaluate for ejection fraction and 

wall motion abnormality as well.


2. Hypertension.  Continue Cozaar 50 mg b.i.d. and p.r.n. clonidine.


3. Pneumonia on ceftriaxone and albuterol.


4. Bilateral LE DVT Heparin drip per Dr. Johnson. S/P IVC filter.





ROMERO RN





Subjective


Subjective


Alert in NAD. No arrhythmia on tele.





Objective





Last 24 Hour Vital Signs








  Date Time  Temp Pulse Resp B/P Pulse Ox O2 Delivery O2 Flow Rate FiO2


 


12/30/16 12:00 98.2 86 20 130/74 94 Room Air  


 


12/30/16 12:00  90      


 


12/30/16 08:34    148/91    


 


12/30/16 08:00 98.1 90 19 148/91 94 Room Air  


 


12/30/16 08:00  86      


 


12/30/16 06:55     93 Nasal Cannula 2.0 28 12/30/16 06:55      Nasal Cannula 2.0 28


 


12/30/16 06:55  94 20   Nasal Cannula 2.0 28


 


12/30/16 04:00  85      


 


12/30/16 04:00 97.3 82 22 138/97 96 Room Air  


 


12/30/16 00:00  80      


 


12/30/16 00:00 97.6 80 22 138/75 96 Room Air  


 


12/29/16 22:17    142/80    


 


12/29/16 20:00 98.4 87 22 142/80 95 Room Air  


 


12/29/16 18:56     95 Nasal Cannula 2.0 28 12/29/16 18:56  88 18   Nasal Cannula 2.0 28 12/29/16 18:56      Nasal Cannula 2.0 28


 


12/29/16 16:17      Nasal Cannula 2.0 28 12/29/16 16:13 98.2 84 21 135/73 93 Room Air 3.0 

















Intake and Output  


 


 12/29/16 12/30/16





 19:00 07:00


 


Intake Total 290 ml 0 ml


 


Output Total  800 ml


 


Balance 290 ml -800 ml


 


  


 


Intake Oral 240 ml 0 ml


 


IV Total 50 ml 


 


Output Urine Total  800 ml


 


# Bowel Movements 2 2











Laboratory Tests








Test


  12/29/16


16:15 12/30/16


03:45


 


D-Dimer


  5730 ng/mL


(<500)  H 


 


 


Pro-B-Type Natriuretic Peptide


  719 pg/mL


(0-450)  H 642 pg/mL


(0-450)  H


 


White Blood Count


  


  6.1 K/UL


(4.8-10.8)


 


Red Blood Count


  


  4.04 M/UL


(4.20-5.40)  L


 


Hemoglobin


  


  12.1 G/DL


(12.0-16.0)


 


Hematocrit


  


  37.8 %


(37.0-47.0)


 


Mean Corpuscular Volume  94 FL (80-99)  


 


Mean Corpuscular Hemoglobin


  


  29.9 PG


(27.0-31.0)


 


Mean Corpuscular Hemoglobin


Concent 


  31.9 G/DL


(32.0-36.0)  L


 


Red Cell Distribution Width


  


  13.8 %


(11.6-14.8)


 


Platelet Count


  


  152 K/UL


(150-450)


 


Mean Platelet Volume


  


  8.7 FL


(6.5-10.1)


 


Neutrophils (%) (Auto)


  


  63.9 %


(45.0-75.0)


 


Lymphocytes (%) (Auto)


  


  20.9 %


(20.0-45.0)


 


Monocytes (%) (Auto)


  


  11.8 %


(1.0-10.0)  H


 


Eosinophils (%) (Auto)


  


  2.3 %


(0.0-3.0)


 


Basophils (%) (Auto)


  


  1.2 %


(0.0-2.0)


 


Sodium Level


  


  138 mEQ/L


(135-145)


 


Potassium Level


  


  3.7 mEQ/L


(3.4-4.9)


 


Chloride Level


  


  98 mEQ/L


()


 


Carbon Dioxide Level


  


  26 mEQ/L


(20-30)


 


Anion Gap  14 (5-15)  


 


Blood Urea Nitrogen


  


  9 mg/dL (7-23)


 


 


Creatinine


  


  0.6 mg/dL


(0.5-0.9)


 


Estimat Glomerular Filtration


Rate 


   mL/min (>60)  


 


 


Glucose Level


  


  86 mg/dL


()


 


Calcium Level


  


  8.7 mg/dL


(8.6-10.2)


 


Troponin I


  


  < 0.30 ng/mL


(<=0.30)


 


Thyroid Stimulating Hormone


(TSH) 


  2.200 uIU/mL


(0.300-4.500)


 


Free Thyroxine


  


  1.17 ng/dL


(0.86-1.85)











Microbiology








 Date/Time


Source Procedure


Growth Status


 


 


 12/28/16 22:45


Blood Blood Culture - Preliminary Resulted


 


 12/28/16 22:30


Blood Blood Culture - Preliminary Resulted





 12/29/16 16:30


Sputum Expectorated Gram Stain - Final Resulted


 


 12/29/16 16:30


Sputum Expectorated Sputum Culture - Preliminary Resulted


 


 12/29/16 16:30


Urine,Clean Catch Urine Culture - Preliminary


NO GROWTH Resulted








Objective


HEAD AND NECK:  Shows no JVD.


LUNGS:  Coarse rhonchi.


CARDIOVASCULAR:  Tachycardic S1 and S2 with no gallop or murmur.


ABDOMEN:  Soft.


EXTREMITIES:  No pitting edema.











MALCOLM ZULUAGA Dec 30, 2016 16:17

## 2016-12-30 NOTE — INFECTIOUS DISEASES PROG NOTE
Assessment/Plan


Assessment/Plan


A:





The patient is an 86-year-old female





Pneumonia 


  Shortness of breath and fever


  Chest CT: Diffuse bilateral interstitial disease, possibly with micro-

nodularity, disseminated inflammatory lesions a possibility.


UTI , probable 


Blood Cx :GPC ? contaminant 








History of cataract surgery.


History of dysphagia.


CVA.


History of brain aneurysm.


 Dementia.


Hypertension.


Depression.


Anxiety











 pyuria.





PLAN:








Cont patient on IV Rocephin d # 2 , add Zithro and IV Vanco d# 1


Monitor blood cultures (blood, urine and sputum).


Chest x-ray.


Monitor CBC and BMP.





Subjective


Allergies:  


Coded Allergies:  


     No Known Allergies (Unverified , 10/4/16)





Objective


Vital Signs





Last 24 Hour Vital Signs








  Date Time  Temp Pulse Resp B/P Pulse Ox O2 Delivery O2 Flow Rate FiO2


 


12/30/16 08:34    148/91    


 


12/30/16 08:00 98.1 90 19 148/91 94 Room Air  


 


12/30/16 08:00  86      


 


12/30/16 04:00  85      


 


12/30/16 04:00 97.3 82 22 138/97 96 Room Air  


 


12/30/16 00:00  80      


 


12/30/16 00:00 97.6 80 22 138/75 96 Room Air  


 


12/29/16 22:17    142/80    


 


12/29/16 20:00 98.4 87 22 142/80 95 Room Air  


 


12/29/16 18:56     95 Nasal Cannula 2.0 28


 


12/29/16 18:56  88 18   Nasal Cannula 2.0 28


 


12/29/16 18:56      Nasal Cannula 2.0 28


 


12/29/16 16:17      Nasal Cannula 2.0 28


 


12/29/16 16:13 98.2 84 21 135/73 93 Room Air 3.0 


 


12/29/16 16:00  81      


 


12/29/16 12:00 97.7 74 24 141/81 94 Nasal Cannula 2.0 28








Height (Feet):  5


Height (Inches):  6.00


Weight (Pounds):  140





Microbiology








 Date/Time


Source Procedure


Growth Status


 


 


 12/28/16 22:45


Blood Blood Culture - Preliminary Resulted


 


 12/28/16 22:30


Blood Blood Culture - Preliminary Resulted





 12/29/16 16:30


Sputum Expectorated Gram Stain - Final Resulted


 


 12/29/16 16:30


Sputum Expectorated Sputum Culture - Preliminary Resulted


 


 12/29/16 16:30


Urine,Clean Catch Urine Culture - Preliminary


NO GROWTH Resulted











Laboratory Tests








Test


  12/29/16


16:15 12/30/16


03:45


 


D-Dimer


  5730 ng/mL


(<500)  H 


 


 


Pro-B-Type Natriuretic Peptide


  719 pg/mL


(0-450)  H 642 pg/mL


(0-450)  H


 


White Blood Count


  


  6.1 K/UL


(4.8-10.8)


 


Red Blood Count


  


  4.04 M/UL


(4.20-5.40)  L


 


Hemoglobin


  


  12.1 G/DL


(12.0-16.0)


 


Hematocrit


  


  37.8 %


(37.0-47.0)


 


Mean Corpuscular Volume  94 FL (80-99)  


 


Mean Corpuscular Hemoglobin


  


  29.9 PG


(27.0-31.0)


 


Mean Corpuscular Hemoglobin


Concent 


  31.9 G/DL


(32.0-36.0)  L


 


Red Cell Distribution Width


  


  13.8 %


(11.6-14.8)


 


Platelet Count


  


  152 K/UL


(150-450)


 


Mean Platelet Volume


  


  8.7 FL


(6.5-10.1)


 


Neutrophils (%) (Auto)


  


  63.9 %


(45.0-75.0)


 


Lymphocytes (%) (Auto)


  


  20.9 %


(20.0-45.0)


 


Monocytes (%) (Auto)


  


  11.8 %


(1.0-10.0)  H


 


Eosinophils (%) (Auto)


  


  2.3 %


(0.0-3.0)


 


Basophils (%) (Auto)


  


  1.2 %


(0.0-2.0)


 


Sodium Level


  


  138 mEQ/L


(135-145)


 


Potassium Level


  


  3.7 mEQ/L


(3.4-4.9)


 


Chloride Level


  


  98 mEQ/L


()


 


Carbon Dioxide Level


  


  26 mEQ/L


(20-30)


 


Anion Gap  14 (5-15)  


 


Blood Urea Nitrogen


  


  9 mg/dL (7-23)


 


 


Creatinine


  


  0.6 mg/dL


(0.5-0.9)


 


Estimat Glomerular Filtration


Rate 


   mL/min (>60)  


 


 


Glucose Level


  


  86 mg/dL


()


 


Calcium Level


  


  8.7 mg/dL


(8.6-10.2)


 


Troponin I


  


  < 0.30 ng/mL


(<=0.30)


 


Thyroid Stimulating Hormone


(TSH) 


  2.200 uIU/mL


(0.300-4.500)


 


Free Thyroxine


  


  1.17 ng/dL


(0.86-1.85)











Current Medications








 Medications


  (Trade)  Dose


 Ordered  Sig/Toribio


 Route


 PRN Reason  Start Time


 Stop Time Status Last Admin


Dose Admin


 


 Acetaminophen 650


 mg  650 mg  Q4H  PRN


 RECTAL


 Fever/Headache/Mild Pain  12/29/16 09:30


 1/28/17 09:29  12/29/16 10:04


 


 


 Acetaminophen/


 Hydrocodone Bitart


  (Norco 10/325)  1 ea  Q6H  PRN


 ORAL


 Pain Scale (6-10)  12/29/16 01:45


 1/5/17 01:44   


 


 


 Albuterol/


 Ipratropium


  (DuoNeb


 0.5-3(2.5)mg/3ml)  3 ml  Q4H  PRN


 HHN


 Shortness of Breath  12/29/16 01:45


 1/3/17 01:44   


 


 


 Ceftriaxone


 Sodium/Dextrose


  (Rocephin/D5W


 50ml)  50 ml @ 


 100 mls/hr  Q24H


 IVPB


   12/29/16 14:00


 1/5/17 13:59  12/29/16 15:15


 


 


 Clonidine HCl


  (Catapres)  0.1 mg  Q6H  PRN


 ORAL


 For High Blood Pressure  12/29/16 01:45


 1/28/17 01:44   


 


 


 Dextrose


  (Dextrose 50%)    STAT  PRN


 IV


 Hypoglycemia  12/29/16 01:45


 1/28/17 01:44   


 


 


 Diphenhydramine


 HCl


  (Benadryl)  25 mg  Q6H  PRN


 ORAL


 Itching/Pruritis  12/29/16 01:45


 1/28/17 01:44   


 


 


 Docusate Sodium


  (Colace)  100 mg  DAILY


 ORAL


   12/29/16 09:00


 1/28/17 08:59  12/30/16 08:34


 


 


 Guaifenesin


  (Mucinex)  600 mg  TWICE A  DAY


 ORAL


   12/29/16 18:00


 1/28/17 17:59  12/30/16 08:44


 


 


 Guaifenesin


  (Robitussin)  100 mg  Q4H  PRN


 ORAL


 For Cough  12/29/16 15:00


 1/28/17 14:59   


 


 


 Heparin Sodium


  (Porcine)


  (Heparin 5000


 units/ml)  5,000 units  EVERY 12  HOURS


 SUBQ


   12/29/16 09:00


 1/28/17 08:59  12/30/16 08:46


 


 


 Lorazepam


  (Ativan 2mg/ml


 1ml)  1 mg  Q4H  PRN


 IV


 For Anxiety  12/29/16 13:45


 1/5/17 13:44  12/29/16 18:56


 


 


 Lorazepam


  (Ativan)  1 mg  Q8H  PRN


 ORAL


 For Anxiety  12/29/16 01:45


 1/5/17 01:44   


 


 


 Losartan Potassium


  (Cozaar)  50 mg  Q12HR


 ORAL


   12/29/16 09:00


 1/28/17 08:59  12/30/16 08:34


 


 


 Pantoprazole


  (Protonix)  40 mg  Q12H  PRN


 ORAL


 Abdominal cramps  12/29/16 01:45


 1/28/17 01:44   


 

















BRIANDA PAYNE M.D. Dec 30, 2016 11:53

## 2016-12-30 NOTE — CARDIOLOGY REPORT
--------------- APPROVED REPORT --------------





EKG Measurement

Heart [id number]

OH 126P20

[id number]

[id number]

[id number]





Normal sinus rhythm

Low voltage QRS

Cannot rule out Anterior infarct, age undetermined

Abnormal ECG

## 2016-12-30 NOTE — GENERAL PROGRESS NOTE
Assessment/Plan


Assessment/Plan


ASSESSMENT:


#. DVT of the lower extremities - s/p ivc filter and in recent past on coumadin 

--> Will see if IVC filter can be removed since was placed here and patient now 

not bleeding and h/h are adequate


#. Anemia potentially secondary to anemia of chronic disease versus 

hemodilution. Improved


#. Leukocytosis. Improved


#. Trochanteric fracture of the right femur status post open reduction and 

internal fixation and repair pending further evaluation.


#. Sepsis.


#. Acute posthemorrhagic anemia.


#. PNA





RECOMMENDATIONS:


1. Consider to restart coumadin if IVC removed


2. DVT ppx heparin


3. Goal INR 2-3 if coumadin restated


4. Consider removal of IVC filter


5. Continue antibiotics as needed


6. Transfuse if hemoglobin less than 7.5


7. Continue nutritional support.


8.  Staff





Thank you,





Daniel Sierra MD





Subjective


Constitutional:  Reports: no symptoms


HEENT:  Reports: no symptoms


Cardiovascular:  Reports: no symptoms


Respiratory:  Reports: no symptoms


Gastrointestinal/Abdominal:  Reports: poor appetite


Genitourinary:  Reports: no symptoms


Neurologic/Psychiatric:  Reports: no symptoms


Endocrine:  Reports: no symptoms


Hematologic/Lymphatic:  Reports: anemia


Allergies:  


Coded Allergies:  


     No Known Allergies (Unverified , 10/4/16)


Subjective


stable, no issues overnight





Objective





Last 24 Hour Vital Signs








  Date Time  Temp Pulse Resp B/P Pulse Ox O2 Delivery O2 Flow Rate FiO2


 


12/30/16 12:00 98.2 86 20 130/74 94 Room Air  


 


12/30/16 12:00  90      


 


12/30/16 08:34    148/91    


 


12/30/16 08:00 98.1 90 19 148/91 94 Room Air  


 


12/30/16 08:00  86      


 


12/30/16 04:00  85      


 


12/30/16 04:00 97.3 82 22 138/97 96 Room Air  


 


12/30/16 00:00  80      


 


12/30/16 00:00 97.6 80 22 138/75 96 Room Air  


 


12/29/16 22:17    142/80    


 


12/29/16 20:00 98.4 87 22 142/80 95 Room Air  


 


12/29/16 18:56     95 Nasal Cannula 2.0 28 12/29/16 18:56  88 18   Nasal Cannula 2.0 28 12/29/16 18:56      Nasal Cannula 2.0 28 12/29/16 16:17      Nasal Cannula 2.0 28


 


12/29/16 16:13 98.2 84 21 135/73 93 Room Air 3.0 


 


12/29/16 16:00  81      

















Intake and Output  


 


 12/29/16 12/30/16





 19:00 07:00


 


Intake Total 290 ml 0 ml


 


Output Total  800 ml


 


Balance 290 ml -800 ml


 


  


 


Intake Oral 240 ml 0 ml


 


IV Total 50 ml 


 


Output Urine Total  800 ml


 


# Bowel Movements 2 2








Laboratory Tests


12/29/16 16:15: 


D-Dimer 5730H, Pro-B-Type Natriuretic Peptide 719H


12/30/16 03:45: 


Pro-B-Type Natriuretic Peptide 642H, White Blood Count 6.1, Red Blood Count 

4.04L, Hemoglobin 12.1, Hematocrit 37.8, Mean Corpuscular Volume 94, Mean 

Corpuscular Hemoglobin 29.9, Mean Corpuscular Hemoglobin Concent 31.9L, Red 

Cell Distribution Width 13.8, Platelet Count 152, Mean Platelet Volume 8.7, 

Neutrophils (%) (Auto) 63.9, Lymphocytes (%) (Auto) 20.9, Monocytes (%) (Auto) 

11.8H, Eosinophils (%) (Auto) 2.3, Basophils (%) (Auto) 1.2, Sodium Level 138, 

Potassium Level 3.7, Chloride Level 98, Carbon Dioxide Level 26, Anion Gap 14, 

Blood Urea Nitrogen 9, Creatinine 0.6, Estimat Glomerular Filtration Rate , 

Glucose Level 86, Calcium Level 8.7, Troponin I < 0.30, Thyroid Stimulating 

Hormone (TSH) 2.200, Free Thyroxine 1.17


Height (Feet):  5


Height (Inches):  6.00


Weight (Pounds):  140


General Appearance:  no apparent distress


EENT:  TMs normal


Neck:  supple


Cardiovascular:  regular rhythm


Respiratory/Chest:  normal breath sounds


Abdomen:  soft


Extremities:  non-tender


Edema:  1+ Leg (L), 1+ Leg (R)


Edema:  mild edema


Neurologic:  alert











Daniel Sierra Dec 30, 2016 14:24

## 2016-12-31 NOTE — DIAGNOSTIC IMAGING REPORT
Indication: Shortness of breath



Technique: One view of the chest



Comparison: 12/17/2016



Findings: Interstitium appears slightly less prominent, may reflect improving

bronchitis or interstitial edema. There is still some central bronchial wall

thickening which is probably chronic. No acute consolidation. No effusions. The

heart size is normal. Aorta is tortuous and calcified.



Impression: No acute process. Findings as noted

## 2016-12-31 NOTE — INFECTIOUS DISEASES PROG NOTE
Assessment/Plan


Assessment/Plan





ASSESSMENT: 86-year-old female with:





CONS bacteremia 4/4 r/o SBE, septic thrombophlebitis


Pneumonia ?septic emboli - SCx Strep


  Chest CT: Diffuse bilateral interstitial disease, possibly with micro-

nodularity, disseminated inflammatory lesions a possibility.


UTI , probable - UCx(-)


Fever - resolved, no leukocytosis








Acute BLE DVT SP IVC filter


CVA.


History of brain aneurysm.


Dementia.


NKDA


Full Code








PLAN:





Cont  IV Rocephin d # 3, azithro, IV Vanco d# 2


Monitor blood cultures (blood, urine and sputum) - repeat blood cultures, check 

TTE


Chest x-ray.


Monitor CBC and BMP





Subjective


Allergies:  


Coded Allergies:  


     No Known Allergies (Unverified , 10/4/16)


Subjective





fevers resolved





Objective


Vital Signs





Last 24 Hour Vital Signs








  Date Time  Temp Pulse Resp B/P Pulse Ox O2 Delivery O2 Flow Rate FiO2


 


12/31/16 04:00  91      


 


12/31/16 04:00 97.2 84 20 158/79 95 Nasal Cannula 3.0 


 


12/31/16 00:18 98.1 94 20 161/89 93 Room Air  


 


12/31/16 00:00  94      


 


12/30/16 20:58    167/91    


 


12/30/16 20:00  90      


 


12/30/16 20:00 98.2 91 26 167/91 95 Nasal Cannula 3.0 


 


12/30/16 19:11      Nasal Cannula 2.0 28


 


12/30/16 19:11     95 Nasal Cannula 2.0 28


 


12/30/16 19:10  92 20   Nasal Cannula 2.0 28


 


12/30/16 16:00 98.6 92 14 138/85 95   


 


12/30/16 16:00  89      


 


12/30/16 12:00 98.2 86 20 130/74 94 Room Air  


 


12/30/16 12:00  90      


 


12/30/16 08:34    148/91    


 


12/30/16 08:00 98.1 90 19 148/91 94 Room Air  


 


12/30/16 08:00  86      








Height (Feet):  5


Height (Inches):  6.00


Weight (Pounds):  140


General Appearance:  no acute distress


Respiratory/Chest:  no respiratory distress


Cardiovascular:  normal rate, regular rhythm


Abdomen:  normal bowel sounds, soft, non tender, non distended





Microbiology








 Date/Time


Source Procedure


Growth Status


 


 


 12/28/16 22:45


Blood Blood Culture - Preliminary


Staphylococcus Sp Coag Neg Resulted


 


 12/28/16 22:30


Blood Blood Culture - Preliminary


Staphylococcus Sp Coag Neg Resulted





 12/29/16 16:30


Sputum Expectorated Gram Stain - Final Resulted


 


 12/29/16 16:30 Sputum Culture - Preliminary


Strep Species, Alpha Hemolytic Resulted


 


 12/29/16 00:00


Nasal Nares MRSA Culture - Final


NO METHICILLIN RESISTANT STAPH AUREUS... Complete


 


 12/29/16 16:30


Urine,Clean Catch Urine Culture - Final


NO GROWTH AFTER 48 HOURS Complete











Laboratory Tests








Test


  12/31/16


04:30


 


White Blood Count


  5.2 K/UL


(4.8-10.8)


 


Red Blood Count


  4.08 M/UL


(4.20-5.40)  L


 


Hemoglobin


  12.4 G/DL


(12.0-16.0)


 


Hematocrit


  37.5 %


(37.0-47.0)


 


Mean Corpuscular Volume 92 FL (80-99)  


 


Mean Corpuscular Hemoglobin


  30.4 PG


(27.0-31.0)


 


Mean Corpuscular Hemoglobin


Concent 33.1 G/DL


(32.0-36.0)


 


Red Cell Distribution Width


  13.1 %


(11.6-14.8)


 


Platelet Count


  128 K/UL


(150-450)  L


 


Mean Platelet Volume


  7.8 FL


(6.5-10.1)


 


Neutrophils (%) (Auto)


  57.3 %


(45.0-75.0)


 


Lymphocytes (%) (Auto)


  24.9 %


(20.0-45.0)


 


Monocytes (%) (Auto)


  15.0 %


(1.0-10.0)  H


 


Eosinophils (%) (Auto)


  0.8 %


(0.0-3.0)


 


Basophils (%) (Auto)


  2.1 %


(0.0-2.0)  H


 


Sodium Level


  142 mEQ/L


(135-145)


 


Potassium Level


  3.3 mEQ/L


(3.4-4.9)  L


 


Chloride Level


  101 mEQ/L


()


 


Carbon Dioxide Level


  29 mEQ/L


(20-30)


 


Anion Gap 12 (5-15)  


 


Blood Urea Nitrogen


  6 mg/dL (7-23)


L


 


Creatinine


  0.7 mg/dL


(0.5-0.9)


 


Estimat Glomerular Filtration


Rate  mL/min (>60)  


 


 


Glucose Level


  102 mg/dL


()


 


Calcium Level


  8.8 mg/dL


(8.6-10.2)











Current Medications








 Medications


  (Trade)  Dose


 Ordered  Sig/Toribio


 Route


 PRN Reason  Start Time


 Stop Time Status Last Admin


Dose Admin


 


 Acetaminophen 650


 mg  650 mg  Q4H  PRN


 RECTAL


 Fever/Headache/Mild Pain  12/29/16 09:30


 1/28/17 09:29  12/29/16 10:04


 


 


 Acetaminophen/


 Hydrocodone Bitart


  (Norco 10/325)  1 ea  Q6H  PRN


 ORAL


 Pain Scale (6-10)  12/29/16 01:45


 1/5/17 01:44   


 


 


 Albuterol/


 Ipratropium


  (DuoNeb


 0.5-3(2.5)mg/3ml)  3 ml  Q4H  PRN


 HHN


 Shortness of Breath  12/29/16 01:45


 1/3/17 01:44   


 


 


 Azithromycin 500


 mg/Dextrose  250 ml @ 


 250 mls/hr  Q24HRS


 IV


   12/30/16 15:00


 1/5/17 15:59  12/30/16 15:12


 


 


 Ceftriaxone


 Sodium/Dextrose


  (Rocephin/D5W


 50ml)  50 ml @ 


 100 mls/hr  Q24H


 IVPB


   12/29/16 14:00


 1/5/17 13:59  12/30/16 13:21


 


 


 Clonidine HCl


  (Catapres)  0.1 mg  Q6H  PRN


 ORAL


 For High Blood Pressure  12/29/16 01:45


 1/28/17 01:44   


 


 


 Dextrose


  (Dextrose 50%)    STAT  PRN


 IV


 Hypoglycemia  12/29/16 01:45


 1/28/17 01:44   


 


 


 Diphenhydramine


 HCl


  (Benadryl)  25 mg  Q6H  PRN


 ORAL


 Itching/Pruritis  12/29/16 01:45


 1/28/17 01:44   


 


 


 Docusate Sodium


  (Colace)  100 mg  DAILY


 ORAL


   12/29/16 09:00


 1/28/17 08:59  12/30/16 08:34


 


 


 Enoxaparin Sodium


  (Lovenox)  60 mg  Q12HR@0600,1800


 SUBQ


   12/30/16 18:00


 1/29/17 17:59  12/31/16 06:14


 


 


 Guaifenesin


  (Mucinex)  600 mg  TWICE A  DAY


 ORAL


   12/29/16 18:00


 1/28/17 17:59  12/30/16 17:51


 


 


 Guaifenesin


  (Robitussin)  100 mg  Q4H  PRN


 ORAL


 For Cough  12/29/16 15:00


 1/28/17 14:59   


 


 


 Lorazepam


  (Ativan 2mg/ml


 1ml)  1 mg  Q4H  PRN


 IV


 For Anxiety  12/29/16 13:45


 1/5/17 13:44  12/29/16 18:56


 


 


 Lorazepam


  (Ativan)  1 mg  Q8H  PRN


 ORAL


 For Anxiety  12/29/16 01:45


 1/5/17 01:44   


 


 


 Losartan Potassium


  (Cozaar)  50 mg  Q12HR


 ORAL


   12/29/16 09:00


 1/28/17 08:59  12/30/16 20:58


 


 


 Pantoprazole


  (Protonix)  40 mg  Q12H  PRN


 ORAL


 Abdominal cramps  12/29/16 01:45


 1/28/17 01:44   


 


 


 Vancomycin HCl 1


 ea  1 ea  DAILY  PRN


 MISC


 Per rx protocol  12/30/16 12:00


 1/29/17 11:59   


 


 


 Vancomycin HCl/


 Dextrose


  (Vancomycin/D5W


 250ml)  250 ml @ 


 167 mls/hr  Q24H


 IVPB


   12/31/16 16:00


 1/5/17 15:59   


 

















CECY BREWER Dec 31, 2016 07:48

## 2016-12-31 NOTE — GENERAL PROGRESS NOTE
Assessment/Plan


Assessment/Plan


ASSESSMENT:


#. DVT of the lower extremities - s/p ivc filter and in recent past on coumadin 

--> IVC not removable


#. Anemia potentially secondary to anemia of chronic disease versus 

hemodilution. Improved


#. Leukocytosis. Improved


#. Trochanteric fracture of the right femur status post open reduction and 

internal fixation and repair pending further evaluation.


#. Sepsis.


#. Acute posthemorrhagic anemia.


#. PNA





RECOMMENDATIONS:


1. Start coumadin


2. DVT ppx lovenox


3. Goal INR 2-3 


4. Continue antibiotics as needed


5. Transfuse if hemoglobin less than 7.5


6. Continue nutritional support.


7.  Staff





Thank you,





Daniel Sierra MD





Subjective


Constitutional:  Reports: no symptoms


HEENT:  Reports: no symptoms


Cardiovascular:  Reports: no symptoms


Respiratory:  Reports: no symptoms


Gastrointestinal/Abdominal:  Reports: poor appetite


Genitourinary:  Reports: no symptoms


Neurologic/Psychiatric:  Reports: no symptoms


Endocrine:  Reports: no symptoms


Hematologic/Lymphatic:  Reports: anemia


Allergies:  


Coded Allergies:  


     No Known Allergies (Unverified , 10/4/16)


Subjective


stable, not bleeding





Objective





Last 24 Hour Vital Signs








  Date Time  Temp Pulse Resp B/P Pulse Ox O2 Delivery O2 Flow Rate FiO2


 


12/31/16 11:48 97.9 89 20 140/86 94 Nasal Cannula 3.0 


 


12/31/16 08:18      Nasal Cannula 3.0 32


 


12/31/16 08:18     94 Nasal Cannula 3.0 32


 


12/31/16 08:17  90 20   Nasal Cannula 3.0 32


 


12/31/16 08:00 98.4 84 18 152/84 94 Nasal Cannula 3.0 


 


12/31/16 08:00  83      


 


12/31/16 04:00  91      


 


12/31/16 04:00 97.2 84 20 158/79 95 Nasal Cannula 3.0 


 


12/31/16 00:18 98.1 94 20 161/89 93 Room Air  


 


12/31/16 00:00  94      


 


12/30/16 20:58    167/91    


 


12/30/16 20:00  90      


 


12/30/16 20:00 98.2 91 26 167/91 95 Nasal Cannula 3.0 


 


12/30/16 19:11      Nasal Cannula 2.0 28


 


12/30/16 19:11     95 Nasal Cannula 2.0 28


 


12/30/16 19:10  92 20   Nasal Cannula 2.0 28


 


12/30/16 16:00 98.6 92 14 138/85 95   


 


12/30/16 16:00  89      

















Intake and Output  


 


 12/30/16 12/31/16





 19:00 07:00


 


Intake Total 580 ml 350 ml


 


Output Total 600 ml 825 ml


 


Balance -20 ml -475 ml


 


  


 


Intake Oral 280 ml 350 ml


 


IV Total 300 ml 


 


Output Urine Total 600 ml 825 ml


 


# Bowel Movements 1 2








Laboratory Tests


12/31/16 04:30: 


White Blood Count 5.2, Red Blood Count 4.08L, Hemoglobin 12.4, Hematocrit 37.5, 

Mean Corpuscular Volume 92, Mean Corpuscular Hemoglobin 30.4, Mean Corpuscular 

Hemoglobin Concent 33.1, Red Cell Distribution Width 13.1, Platelet Count 128L, 

Mean Platelet Volume 7.8, Neutrophils (%) (Auto) 57.3, Lymphocytes (%) (Auto) 

24.9, Monocytes (%) (Auto) 15.0H, Eosinophils (%) (Auto) 0.8, Basophils (%) (

Auto) 2.1H, Sodium Level 142, Potassium Level 3.3L, Chloride Level 101, Carbon 

Dioxide Level 29, Anion Gap 12, Blood Urea Nitrogen 6L, Creatinine 0.7, Estimat 

Glomerular Filtration Rate , Glucose Level 102, Calcium Level 8.8


Height (Feet):  5


Height (Inches):  6.00


Weight (Pounds):  140


General Appearance:  no apparent distress


EENT:  TMs normal


Neck:  supple


Cardiovascular:  regular rhythm


Respiratory/Chest:  lungs clear


Abdomen:  no mass


Extremities:  non-tender


Edema:  no edema noted Leg (L), no edema noted Leg (R)


Edema:  mild edema


Neurologic:  alert


Skin:  warm/dry











Daniel Sierra Dec 31, 2016 12:27

## 2016-12-31 NOTE — NEPHROLOGY PROGRESS NOTE
Assessment/Plan


Problem List:  


(1) Gram positive bacterial infection


(2) Congestive heart failure (CHF)


(3) Respiratory distress


(4) HCAP (healthcare-associated pneumonia)


(5) Dyspnea


(6) Alzheimer's dementia


(7) Dysphagia


(8) CVA (cerebral vascular accident)


(9) HTN (hypertension)


Plan


cont abx per ID. 


cardio following. 


tx to [place].





Subjective


Subjective


sleeping. had dose of ativan earlier for agitation.





Objective


Objective





Last 24 Hour Vital Signs








  Date Time  Temp Pulse Resp B/P Pulse Ox O2 Delivery O2 Flow Rate FiO2


 


12/31/16 08:18      Nasal Cannula 3.0 32


 


12/31/16 08:18     94 Nasal Cannula 3.0 32


 


12/31/16 08:17  90 20   Nasal Cannula 3.0 32


 


12/31/16 08:00 98.4 84 18 152/84 94 Nasal Cannula 3.0 


 


12/31/16 08:00  83      


 


12/31/16 04:00  91      


 


12/31/16 04:00 97.2 84 20 158/79 95 Nasal Cannula 3.0 


 


12/31/16 00:18 98.1 94 20 161/89 93 Room Air  


 


12/31/16 00:00  94      


 


12/30/16 20:58    167/91    


 


12/30/16 20:00  90      


 


12/30/16 20:00 98.2 91 26 167/91 95 Nasal Cannula 3.0 


 


12/30/16 19:11      Nasal Cannula 2.0 28


 


12/30/16 19:11     95 Nasal Cannula 2.0 28


 


12/30/16 19:10  92 20   Nasal Cannula 2.0 28


 


12/30/16 16:00 98.6 92 14 138/85 95   


 


12/30/16 16:00  89      


 


12/30/16 12:00 98.2 86 20 130/74 94 Room Air  


 


12/30/16 12:00  90      

















Intake and Output  


 


 12/30/16 12/31/16





 19:00 07:00


 


Intake Total 580 ml 350 ml


 


Output Total 600 ml 825 ml


 


Balance -20 ml -475 ml


 


  


 


Intake Oral 280 ml 350 ml


 


IV Total 300 ml 


 


Output Urine Total 600 ml 825 ml


 


# Bowel Movements 1 2








Laboratory Tests


12/31/16 04:30: 


White Blood Count 5.2, Red Blood Count 4.08L, Hemoglobin 12.4, Hematocrit 37.5, 

Mean Corpuscular Volume 92, Mean Corpuscular Hemoglobin 30.4, Mean Corpuscular 

Hemoglobin Concent 33.1, Red Cell Distribution Width 13.1, Platelet Count 128L, 

Mean Platelet Volume 7.8, Neutrophils (%) (Auto) 57.3, Lymphocytes (%) (Auto) 

24.9, Monocytes (%) (Auto) 15.0H, Eosinophils (%) (Auto) 0.8, Basophils (%) (

Auto) 2.1H, Sodium Level 142, Potassium Level 3.3L, Chloride Level 101, Carbon 

Dioxide Level 29, Anion Gap 12, Blood Urea Nitrogen 6L, Creatinine 0.7, Estimat 

Glomerular Filtration Rate , Glucose Level 102, Calcium Level 8.8


Height (Feet):  5


Height (Inches):  6.00


Weight (Pounds):  140


General Appearance:  no apparent distress


Cardiovascular:  normal rate, regular rhythm


Respiratory/Chest:  rhonchi - bilaterally


Abdomen:  non tender, soft


Extremities:  trace edema


Neurologic:  disoriented











PARK ALY Dec 31, 2016 11:47

## 2016-12-31 NOTE — CARDIAC ELECTROPHYSIOLOGY PN
Assessment/Plan


Assessment/Plan


1. Shortness of breath, tachycardia as well as D-dimer more than 5000 and 

bilateral LE DVT.  Chest CT angiogram showed no pulmonary embolism. Echo 

pending.   


2. Hypertension.  Continue Cozaar 50 mg b.i.d. and p.r.n. clonidine.


3. Pneumonia on ceftriaxone and albuterol.


4. Bilateral LE DVT on Lovenox and coumadin per Dr. Johnson. S/P IVC filter.





DW RN





Subjective


Subjective


No events overnight. Comfortable. No arrhythmia on tele.





Objective





Last 24 Hour Vital Signs








  Date Time  Temp Pulse Resp B/P Pulse Ox O2 Delivery O2 Flow Rate FiO2


 


12/31/16 11:48 97.9 89 20 140/86 94 Nasal Cannula 3.0 


 


12/31/16 08:18      Nasal Cannula 3.0 32


 


12/31/16 08:18     94 Nasal Cannula 3.0 32


 


12/31/16 08:17  90 20   Nasal Cannula 3.0 32


 


12/31/16 08:00 98.4 84 18 152/84 94 Nasal Cannula 3.0 


 


12/31/16 08:00  83      


 


12/31/16 04:00  91      


 


12/31/16 04:00 97.2 84 20 158/79 95 Nasal Cannula 3.0 


 


12/31/16 00:18 98.1 94 20 161/89 93 Room Air  


 


12/31/16 00:00  94      


 


12/30/16 20:58    167/91    


 


12/30/16 20:00  90      


 


12/30/16 20:00 98.2 91 26 167/91 95 Nasal Cannula 3.0 


 


12/30/16 19:11      Nasal Cannula 2.0 28


 


12/30/16 19:11     95 Nasal Cannula 2.0 28


 


12/30/16 19:10  92 20   Nasal Cannula 2.0 28


 


12/30/16 16:00 98.6 92 14 138/85 95   


 


12/30/16 16:00  89      

















Intake and Output  


 


 12/30/16 12/31/16





 19:00 07:00


 


Intake Total 580 ml 350 ml


 


Output Total 600 ml 825 ml


 


Balance -20 ml -475 ml


 


  


 


Intake Oral 280 ml 350 ml


 


IV Total 300 ml 


 


Output Urine Total 600 ml 825 ml


 


# Bowel Movements 1 2











Laboratory Tests








Test


  12/31/16


04:30 12/31/16


13:30


 


White Blood Count


  5.2 K/UL


(4.8-10.8) 


 


 


Red Blood Count


  4.08 M/UL


(4.20-5.40)  L 


 


 


Hemoglobin


  12.4 G/DL


(12.0-16.0) 


 


 


Hematocrit


  37.5 %


(37.0-47.0) 


 


 


Mean Corpuscular Volume 92 FL (80-99)   


 


Mean Corpuscular Hemoglobin


  30.4 PG


(27.0-31.0) 


 


 


Mean Corpuscular Hemoglobin


Concent 33.1 G/DL


(32.0-36.0) 


 


 


Red Cell Distribution Width


  13.1 %


(11.6-14.8) 


 


 


Platelet Count


  128 K/UL


(150-450)  L 


 


 


Mean Platelet Volume


  7.8 FL


(6.5-10.1) 


 


 


Neutrophils (%) (Auto)


  57.3 %


(45.0-75.0) 


 


 


Lymphocytes (%) (Auto)


  24.9 %


(20.0-45.0) 


 


 


Monocytes (%) (Auto)


  15.0 %


(1.0-10.0)  H 


 


 


Eosinophils (%) (Auto)


  0.8 %


(0.0-3.0) 


 


 


Basophils (%) (Auto)


  2.1 %


(0.0-2.0)  H 


 


 


Sodium Level


  142 mEQ/L


(135-145) 


 


 


Potassium Level


  3.3 mEQ/L


(3.4-4.9)  L 


 


 


Chloride Level


  101 mEQ/L


() 


 


 


Carbon Dioxide Level


  29 mEQ/L


(20-30) 


 


 


Anion Gap 12 (5-15)   


 


Blood Urea Nitrogen


  6 mg/dL (7-23)


L 


 


 


Creatinine


  0.7 mg/dL


(0.5-0.9) 


 


 


Estimat Glomerular Filtration


Rate  mL/min (>60)  


  


 


 


Glucose Level


  102 mg/dL


() 


 


 


Calcium Level


  8.8 mg/dL


(8.6-10.2) 


 


 


Prothrombin Time


  


  11.3 SEC


(9.30-11.50)


 


Prothromb Time International


Ratio 


  1.1 (0.9-1.1)  


 











Microbiology








 Date/Time


Source Procedure


Growth Status


 


 


 12/28/16 22:45


Blood Blood Culture - Preliminary


Staphylococcus Sp Coag Neg Resulted


 


 12/28/16 22:30


Blood Blood Culture - Preliminary


Staphylococcus Sp Coag Neg Resulted





 12/29/16 16:30


Sputum Expectorated Gram Stain - Final Resulted


 


 12/29/16 16:30 Sputum Culture - Preliminary


Strep Species, Alpha Hemolytic Resulted


 


 12/29/16 00:00


Nasal Nares MRSA Culture - Final


NO METHICILLIN RESISTANT STAPH AUREUS... Complete


 


 12/29/16 16:30


Urine,Clean Catch Urine Culture - Final


NO GROWTH AFTER 48 HOURS Complete








Objective


HEAD AND NECK:  Shows no JVD.


LUNGS:  Coarse rhonchi.


CARDIOVASCULAR:  Tachycardic S1 and S2 with no gallop or murmur.


ABDOMEN:  Soft.


EXTREMITIES:  No pitting edema.











MALCOLM ZULUAGA Dec 31, 2016 15:19

## 2016-12-31 NOTE — PULMONOLOGY PROGRESS NOTE
Assessment/Plan


Problems:  


(1) HCAP (healthcare-associated pneumonia)


(2) Alzheimer's dementia


(3) HTN (hypertension)


(4) DVT (deep venous thrombosis)


(5) Gram positive bacterial infection


Assessment & Plan:  ? contaminant





Assessment/Plan


-Optimize pulmonary hygiene/mobilize as tolerated


-PRN O2


-PRN nebs


-Continue abx per ID, F/U repeat CX's


-Monitor volumes


-Has IVC filter, now on LMWH + VKA per heme-onc


-Diet per SLP with STRICT aspiration precautions


-F/U cards recs and TTE - If negative for IE will likely need SEAN


-Full code





Subjective


Allergies:  


Coded Allergies:  


     No Known Allergies (Unverified , 10/4/16)


Subjective


AFVSS, on 2-3 L O2, no cough, + SOB


Was agitated earlier, now sleepy


Started on Coumadin per heme, IVC filter not retrievable





Objective





Last 24 Hour Vital Signs








  Date Time  Temp Pulse Resp B/P Pulse Ox O2 Delivery O2 Flow Rate FiO2


 


12/31/16 11:48 97.9 89 20 140/86 94 Nasal Cannula 3.0 


 


12/31/16 08:18      Nasal Cannula 3.0 32


 


12/31/16 08:18     94 Nasal Cannula 3.0 32


 


12/31/16 08:17  90 20   Nasal Cannula 3.0 32


 


12/31/16 08:00 98.4 84 18 152/84 94 Nasal Cannula 3.0 


 


12/31/16 08:00  83      


 


12/31/16 04:00  91      


 


12/31/16 04:00 97.2 84 20 158/79 95 Nasal Cannula 3.0 


 


12/31/16 00:18 98.1 94 20 161/89 93 Room Air  


 


12/31/16 00:00  94      


 


12/30/16 20:58    167/91    


 


12/30/16 20:00  90      


 


12/30/16 20:00 98.2 91 26 167/91 95 Nasal Cannula 3.0 


 


12/30/16 19:11      Nasal Cannula 2.0 28


 


12/30/16 19:11     95 Nasal Cannula 2.0 28


 


12/30/16 19:10  92 20   Nasal Cannula 2.0 28


 


12/30/16 16:00 98.6 92 14 138/85 95   


 


12/30/16 16:00  89      

















Intake and Output  


 


 12/30/16 12/31/16





 19:00 07:00


 


Intake Total 580 ml 350 ml


 


Output Total 600 ml 825 ml


 


Balance -20 ml -475 ml


 


  


 


Intake Oral 280 ml 350 ml


 


IV Total 300 ml 


 


Output Urine Total 600 ml 825 ml


 


# Bowel Movements 1 2








General Appearance:  no acute distress, cachetic, other - frail elderly female


HEENT:  normocephalic, atraumatic, mucous membranes moist


Respiratory/Chest:  chest wall non-tender, lungs clear, rhonchi - scattered 

faint


Cardiovascular:  normal peripheral pulses, normal rate, regular rhythm


Abdomen:  normal bowel sounds, soft, non tender, no organomegaly, non distended


Extremities:  no cyanosis, no clubbing, no edema





Microbiology








 Date/Time


Source Procedure


Growth Status


 


 


 12/28/16 22:45


Blood Blood Culture - Preliminary


Staphylococcus Sp Coag Neg Resulted


 


 12/28/16 22:30


Blood Blood Culture - Preliminary


Staphylococcus Sp Coag Neg Resulted





 12/29/16 16:30


Sputum Expectorated Gram Stain - Final Resulted


 


 12/29/16 16:30 Sputum Culture - Preliminary


Strep Species, Alpha Hemolytic Resulted


 


 12/29/16 00:00


Nasal Nares MRSA Culture - Final


NO METHICILLIN RESISTANT STAPH AUREUS... Complete


 


 12/29/16 16:30


Urine,Clean Catch Urine Culture - Final


NO GROWTH AFTER 48 HOURS Complete








Laboratory Tests


12/31/16 04:30: 


White Blood Count 5.2, Red Blood Count 4.08L, Hemoglobin 12.4, Hematocrit 37.5, 

Mean Corpuscular Volume 92, Mean Corpuscular Hemoglobin 30.4, Mean Corpuscular 

Hemoglobin Concent 33.1, Red Cell Distribution Width 13.1, Platelet Count 128L, 

Mean Platelet Volume 7.8, Neutrophils (%) (Auto) 57.3, Lymphocytes (%) (Auto) 

24.9, Monocytes (%) (Auto) 15.0H, Eosinophils (%) (Auto) 0.8, Basophils (%) (

Auto) 2.1H, Sodium Level 142, Potassium Level 3.3L, Chloride Level 101, Carbon 

Dioxide Level 29, Anion Gap 12, Blood Urea Nitrogen 6L, Creatinine 0.7, Estimat 

Glomerular Filtration Rate , Glucose Level 102, Calcium Level 8.8


12/31/16 13:30: 


Prothrombin Time 11.3, Prothromb Time International Ratio 1.1





Current Medications








 Medications


  (Trade)  Dose


 Ordered  Sig/Toribio


 Route


 PRN Reason  Start Time


 Stop Time Status Last Admin


Dose Admin


 


 Acetaminophen 650


 mg  650 mg  Q4H  PRN


 RECTAL


 Fever/Headache/Mild Pain  12/29/16 09:30


 1/28/17 09:29  12/29/16 10:04


 


 


 Acetaminophen/


 Hydrocodone Bitart


  (Norco 10/325)  1 ea  Q6H  PRN


 ORAL


 Pain Scale (6-10)  12/29/16 01:45


 1/5/17 01:44   


 


 


 Albuterol/


 Ipratropium


  (DuoNeb


 0.5-3(2.5)mg/3ml)  3 ml  Q4H  PRN


 HHN


 Shortness of Breath  12/29/16 01:45


 1/3/17 01:44   


 


 


 Azithromycin 500


 mg/Dextrose  250 ml @ 


 250 mls/hr  Q24HRS


 IV


   12/30/16 15:00


 1/5/17 15:59  12/30/16 15:12


 


 


 Ceftriaxone


 Sodium/Dextrose


  (Rocephin/D5W


 50ml)  50 ml @ 


 100 mls/hr  Q24H


 IVPB


   12/29/16 14:00


 1/5/17 13:59  12/31/16 13:47


 


 


 Clonidine HCl


  (Catapres)  0.1 mg  Q6H  PRN


 ORAL


 For High Blood Pressure  12/29/16 01:45


 1/28/17 01:44   


 


 


 Dextrose


  (Dextrose 50%)    STAT  PRN


 IV


 Hypoglycemia  12/29/16 01:45


 1/28/17 01:44   


 


 


 Diphenhydramine


 HCl


  (Benadryl)  25 mg  Q6H  PRN


 ORAL


 Itching/Pruritis  12/29/16 01:45


 1/28/17 01:44   


 


 


 Docusate Sodium


  (Colace)  100 mg  DAILY


 ORAL


   12/29/16 09:00


 1/28/17 08:59  12/30/16 08:34


 


 


 Enoxaparin Sodium


  (Lovenox)  60 mg  Q12HR@0600,1800


 SUBQ


   12/30/16 18:00


 1/29/17 17:59  12/31/16 06:14


 


 


 Guaifenesin


  (Mucinex)  600 mg  TWICE A  DAY


 ORAL


   12/29/16 18:00


 1/28/17 17:59  12/30/16 17:51


 


 


 Guaifenesin


  (Robitussin)  100 mg  Q4H  PRN


 ORAL


 For Cough  12/29/16 15:00


 1/28/17 14:59  12/31/16 13:57


 


 


 Lorazepam


  (Ativan 2mg/ml


 1ml)  1 mg  Q4H  PRN


 IV


 For Anxiety  12/29/16 13:45


 1/5/17 13:44  12/31/16 09:17


 


 


 Lorazepam


  (Ativan)  1 mg  Q8H  PRN


 ORAL


 For Anxiety  12/29/16 01:45


 1/5/17 01:44   


 


 


 Losartan Potassium


  (Cozaar)  50 mg  Q12HR


 ORAL


   12/29/16 09:00


 1/28/17 08:59  12/30/16 20:58


 


 


 Pantoprazole


  (Protonix)  40 mg  Q12H  PRN


 ORAL


 Abdominal cramps  12/29/16 01:45


 1/28/17 01:44   


 


 


 Potassium Chloride


  (KCl 10% 20 mEq


 oral solution)  20 meq  TWICE A  DAY


 ORAL


   12/31/16 12:00


 1/30/17 11:59  12/31/16 12:37


 


 


 Vancomycin HCl 1


 ea  1 ea  DAILY  PRN


 MISC


 Per rx protocol  12/30/16 12:00


 1/29/17 11:59   


 


 


 Vancomycin HCl/


 Dextrose


  (Vancomycin/D5W


 250ml)  250 ml @ 


 167 mls/hr  Q24H


 IVPB


   12/31/16 16:00


 1/5/17 15:59   


 


 


 Warfarin Sodium


  (Coumadin per


 pharmacy)  1 ea  DAILY  PRN


 MISC


 Per rx protocol  12/31/16 12:30


 1/30/17 12:29   


 

















SEJAL DEJESUS M.D. Dec 31, 2016 14:31

## 2017-01-01 NOTE — GENERAL PROGRESS NOTE
Assessment/Plan


Assessment/Plan


ASSESSMENT:


#. DVT of the lower extremities - s/p ivc filter and in recent past on coumadin 

--> IVC not removable


#. Anemia potentially secondary to anemia of chronic disease versus 

hemodilution. Improved


#. Leukocytosis. Improved


#. Trochanteric fracture of the right femur status post open reduction and 

internal fixation and repair pending further evaluation.


#. Sepsis.


#. s/p posthemorrhagic anemia.


#. PNA





RECOMMENDATIONS:


1. Continue coumadin


2. DVT ppx lovenox


3. Goal INR 2-3 


4. Continue antibiotics as needed


5. Transfuse if hemoglobin < 7.5


6. Continue nutritional support.


7.  Staff





Thank you,





Lesley Sierra MD





Subjective


Constitutional:  Reports: no symptoms


HEENT:  Reports: no symptoms


Cardiovascular:  Reports: no symptoms


Respiratory:  Reports: no symptoms


Gastrointestinal/Abdominal:  Reports: poor appetite, poor fluid intake


Genitourinary:  Reports: no symptoms


Neurologic/Psychiatric:  Reports: no symptoms


Endocrine:  Reports: no symptoms


Hematologic/Lymphatic:  Reports: anemia


Allergies:  


Coded Allergies:  


     No Known Allergies (Unverified , 10/4/16)


Subjective


stable, no fevers or chills, no bleeding





Objective





Last 24 Hour Vital Signs








  Date Time  Temp Pulse Resp B/P Pulse Ox O2 Delivery O2 Flow Rate FiO2


 


1/1/17 08:09    148/79    


 


1/1/17 08:07 97.7 75 20 148/79 98 Nasal Cannula 3.0 


 


1/1/17 08:00  74      


 


1/1/17 06:52      Nasal Cannula 2.5 


 


1/1/17 06:51     97 Nasal Cannula 2.5 


 


1/1/17 06:50  76 20   Nasal Cannula 2.5 


 


1/1/17 04:00  85      


 


1/1/17 04:00 97.7 81 22 148/75 95 Nasal Cannula 3.0 


 


12/31/16 23:00 98.2 82 20 133/75 97 Nasal Cannula 3.0 


 


12/31/16 22:05    150/70    


 


12/31/16 20:00 99.1 89 22 151/77 95 Nasal Cannula 3.0 


 


12/31/16 20:00  89      


 


12/31/16 19:49     94 Nasal Cannula 3.0 32


 


12/31/16 19:49      Nasal Cannula 3.0 32


 


12/31/16 19:48  95 20   Nasal Cannula 3.0 32


 


12/31/16 16:00  85      


 


12/31/16 16:00 97.7 89 22 124/74 95 Nasal Cannula 3.0 


 


12/31/16 11:48 97.9 89 20 140/86 94 Nasal Cannula 3.0 

















Intake and Output  


 


 12/31/16 1/1/17





 19:00 07:00


 


Intake Total 1054 ml 75 ml


 


Output Total 750 ml 725 ml


 


Balance 304 ml -650 ml


 


  


 


Intake Oral 420 ml 75 ml


 


IV Total 634 ml 


 


Output Urine Total 750 ml 725 ml


 


# Bowel Movements 4 3








Laboratory Tests


12/31/16 13:30: 


Prothrombin Time 11.3, Prothromb Time International Ratio 1.1


1/1/17 03:40: 


Prothrombin Time 11.2, Prothromb Time International Ratio 1.1, Sodium Level 142

, Potassium Level 4.2, Chloride Level 102, Carbon Dioxide Level 29, Anion Gap 11

, Blood Urea Nitrogen 5L, Creatinine 0.6, Estimat Glomerular Filtration Rate , 

Glucose Level 102, Calcium Level 8.8


Height (Feet):  5


Height (Inches):  6.00


Weight (Pounds):  140


General Appearance:  no apparent distress


EENT:  TMs normal


Neck:  supple


Cardiovascular:  regular rhythm


Respiratory/Chest:  normal breath sounds


Abdomen:  non tender


Extremities:  normal range of motion


Edema:  1+ Leg (L), 1+ Leg (R)


Edema:  mild edema


Neurologic:  alert


Skin:  warm/dry











LESLEY SIERRA Jan 1, 2017 09:59

## 2017-01-01 NOTE — PULMONOLOGY PROGRESS NOTE
Assessment/Plan


Problems:  


(1) HCAP (healthcare-associated pneumonia)


(2) Alzheimer's dementia


(3) HTN (hypertension)


(4) DVT (deep venous thrombosis)


(5) Gram positive bacterial infection


Assessment & Plan:  ? contaminant





Assessment/Plan


-Optimize pulmonary hygiene/mobilize as tolerated


-PRN O2


-PRN nebs


-Continue abx per ID, F/U repeat CX's


-Monitor volumes


-Has IVC filter, now on LMWH + VKA per heme-onc


-Diet per SLP with STRICT aspiration precautions


-F/U cards recs and TTE - If negative for IE will likely need SEAN


-Full code





Subjective


Allergies:  


Coded Allergies:  


     No Known Allergies (Unverified , 10/4/16)


Subjective


AFVSS, on 2-3 L O2, no cough, no sig SOB


Mood better





Objective





Last 24 Hour Vital Signs








  Date Time  Temp Pulse Resp B/P Pulse Ox O2 Delivery O2 Flow Rate FiO2


 


1/1/17 12:00  71      


 


1/1/17 12:00 97.5 78 22 143/57 97 Nasal Cannula 3.0 


 


1/1/17 08:09    148/79    


 


1/1/17 08:07 97.7 75 20 148/79 98 Nasal Cannula 3.0 


 


1/1/17 08:00  74      


 


1/1/17 06:52      Nasal Cannula 2.5 


 


1/1/17 06:51     97 Nasal Cannula 2.5 


 


1/1/17 06:50  76 20   Nasal Cannula 2.5 


 


1/1/17 04:00  85      


 


1/1/17 04:00 97.7 81 22 148/75 95 Nasal Cannula 3.0 


 


12/31/16 23:00 98.2 82 20 133/75 97 Nasal Cannula 3.0 


 


12/31/16 22:05    150/70    


 


12/31/16 20:00 99.1 89 22 151/77 95 Nasal Cannula 3.0 


 


12/31/16 20:00  89      


 


12/31/16 19:49     94 Nasal Cannula 3.0 32


 


12/31/16 19:49      Nasal Cannula 3.0 32


 


12/31/16 19:48  95 20   Nasal Cannula 3.0 32


 


12/31/16 16:00  85      


 


12/31/16 16:00 97.7 89 22 124/74 95 Nasal Cannula 3.0 

















Intake and Output  


 


 12/31/16 1/1/17





 19:00 07:00


 


Intake Total 1054 ml 75 ml


 


Output Total 750 ml 725 ml


 


Balance 304 ml -650 ml


 


  


 


Intake Oral 420 ml 75 ml


 


IV Total 634 ml 


 


Output Urine Total 750 ml 725 ml


 


# Bowel Movements 4 3








General Appearance:  no acute distress, cachetic


HEENT:  normocephalic, mucous membranes moist


Respiratory/Chest:  chest wall non-tender, lungs clear, normal breath sounds, 

no respiratory distress


Cardiovascular:  normal peripheral pulses, normal rate, regular rhythm


Abdomen:  normal bowel sounds, soft, non tender, no organomegaly, non distended


Extremities:  no cyanosis, no clubbing, no edema





Microbiology








 Date/Time


Source Procedure


Growth Status


 


 


 12/29/16 16:30


Sputum Expectorated Gram Stain - Final Complete


 


 12/29/16 16:30


Sputum Expectorated Sputum Culture - Final


NORMAL UPPER RESPIRATORY DONY AT 48 ... Complete


 


 12/29/16 16:30


Urine,Clean Catch Urine Culture - Final


NO GROWTH AFTER 48 HOURS Complete








Laboratory Tests


1/1/17 03:40: 


Prothrombin Time 11.2, Prothromb Time International Ratio 1.1, Sodium Level 142

, Potassium Level 4.2, Chloride Level 102, Carbon Dioxide Level 29, Anion Gap 11

, Blood Urea Nitrogen 5L, Creatinine 0.6, Estimat Glomerular Filtration Rate , 

Glucose Level 102, Calcium Level 8.8


1/1/17 11:20: 


White Blood Count 4.7L, Red Blood Count 4.20, Hemoglobin 12.2, Hematocrit 39.7, 

Mean Corpuscular Volume 95, Mean Corpuscular Hemoglobin 29.2, Mean Corpuscular 

Hemoglobin Concent 30.8L, Red Cell Distribution Width 12.9, Platelet Count 144L

, Mean Platelet Volume 7.1, Neutrophils (%) (Auto) 49.0, Lymphocytes (%) (Auto) 

26.7, Monocytes (%) (Auto) 18.7H, Eosinophils (%) (Auto) 4.2H, Basophils (%) (

Auto) 1.5





Current Medications








 Medications


  (Trade)  Dose


 Ordered  Sig/Toribio


 Route


 PRN Reason  Start Time


 Stop Time Status Last Admin


Dose Admin


 


 Acetaminophen 650


 mg  650 mg  Q4H  PRN


 RECTAL


 Fever/Headache/Mild Pain  12/29/16 09:30


 1/28/17 09:29  12/29/16 10:04


 


 


 Acetaminophen/


 Hydrocodone Bitart


  (Norco 10/325)  1 ea  Q6H  PRN


 ORAL


 Pain Scale (6-10)  12/29/16 01:45


 1/5/17 01:44   


 


 


 Albuterol/


 Ipratropium


  (DuoNeb


 0.5-3(2.5)mg/3ml)  3 ml  Q4H  PRN


 HHN


 Shortness of Breath  12/29/16 01:45


 1/3/17 01:44   


 


 


 Azithromycin 500


 mg/Dextrose  250 ml @ 


 250 mls/hr  Q24HRS


 IV


   12/30/16 15:00


 1/5/17 15:59  12/31/16 14:37


 


 


 Ceftriaxone


 Sodium/Dextrose


  (Rocephin/D5W


 50ml)  50 ml @ 


 100 mls/hr  Q24H


 IVPB


   12/29/16 14:00


 1/5/17 13:59  1/1/17 13:42


 


 


 Clonidine HCl


  (Catapres)  0.1 mg  Q6H  PRN


 ORAL


 For High Blood Pressure  12/29/16 01:45


 1/28/17 01:44   


 


 


 Dextrose


  (Dextrose 50%)    STAT  PRN


 IV


 Hypoglycemia  12/29/16 01:45


 1/28/17 01:44   


 


 


 Diphenhydramine


 HCl


  (Benadryl)  25 mg  Q6H  PRN


 ORAL


 Itching/Pruritis  12/29/16 01:45


 1/28/17 01:44   


 


 


 Docusate Sodium


  (Colace)  100 mg  DAILY


 ORAL


   12/29/16 09:00


 1/28/17 08:59  12/30/16 08:34


 


 


 Enoxaparin Sodium


  (Lovenox)  60 mg  Q12HR@0600,1800


 SUBQ


   12/30/16 18:00


 1/29/17 17:59  1/1/17 06:45


 


 


 Guaifenesin


  (Mucinex)  600 mg  TWICE A  DAY


 ORAL


   12/29/16 18:00


 1/28/17 17:59  1/1/17 08:09


 


 


 Guaifenesin


  (Robitussin)  100 mg  Q4H  PRN


 ORAL


 For Cough  12/29/16 15:00


 1/28/17 14:59  1/1/17 12:36


 


 


 Lorazepam


  (Ativan 2mg/ml


 1ml)  1 mg  Q4H  PRN


 IV


 For Anxiety  12/29/16 13:45


 1/5/17 13:44  12/31/16 18:19


 


 


 Lorazepam


  (Ativan)  1 mg  Q8H  PRN


 ORAL


 For Anxiety  12/29/16 01:45


 1/5/17 01:44   


 


 


 Losartan Potassium


  (Cozaar)  50 mg  Q12HR


 ORAL


   12/29/16 09:00


 1/28/17 08:59  1/1/17 08:09


 


 


 Pantoprazole


  (Protonix)  40 mg  Q12H  PRN


 ORAL


 Abdominal cramps  12/29/16 01:45


 1/28/17 01:44   


 


 


 Vancomycin HCl 1


 ea  1 ea  DAILY  PRN


 MISC


 Per rx protocol  12/30/16 12:00


 1/29/17 11:59   


 


 


 Vancomycin HCl/


 Dextrose


  (Vancomycin/D5W


 250ml)  250 ml @ 


 167 mls/hr  Q24H


 IVPB


   12/31/16 16:00


 1/5/17 15:59  12/31/16 16:27


 


 


 Warfarin Sodium


  (Coumadin per


 pharmacy)  1 ea  DAILY  PRN


 MISC


 Per rx protocol  12/31/16 12:30


 1/30/17 12:29   


 

















SEJAL DEJESUS M.D. Jan 1, 2017 13:46

## 2017-01-01 NOTE — NEPHROLOGY PROGRESS NOTE
Assessment/Plan


Problem List:  


(1) Hypokalemia


(2) DVT (deep venous thrombosis)


(3) HTN (hypertension)


(4) Alzheimer's dementia


(5) Dysphagia


(6) Depression


(7) CVA (cerebral vascular accident)


(8) Brain aneurysm


(9) HCAP (healthcare-associated pneumonia)


(10) Dyspnea


(11) Congestive heart failure (CHF)


(12) Gram positive bacterial infection


Plan


abx per ID. 


pulm hygiene. 


psych consult.





Subjective


Subjective


transferred to MS. stable. confused and agitated.





Objective


Objective





Last 24 Hour Vital Signs








  Date Time  Temp Pulse Resp B/P Pulse Ox O2 Delivery O2 Flow Rate FiO2


 


1/1/17 20:49    130/68    


 


1/1/17 20:00 97.8 85 18 150/78 98 Room Air  


 


1/1/17 16:55  71      


 


1/1/17 16:00 97.9 88 22 138/73 97 Nasal Cannula 2.0 


 


1/1/17 12:00  71      


 


1/1/17 12:00 97.5 78 22 143/57 97 Nasal Cannula 3.0 


 


1/1/17 08:09    148/79    


 


1/1/17 08:07 97.7 75 20 148/79 98 Nasal Cannula 3.0 


 


1/1/17 08:00  74      


 


1/1/17 06:52      Nasal Cannula 2.5 


 


1/1/17 06:51     97 Nasal Cannula 2.5 


 


1/1/17 06:50  76 20   Nasal Cannula 2.5 


 


1/1/17 04:00  85      


 


1/1/17 04:00 97.7 81 22 148/75 95 Nasal Cannula 3.0 


 


12/31/16 23:00 98.2 82 20 133/75 97 Nasal Cannula 3.0 

















Intake and Output  


 


 12/31/16 1/1/17





 19:00 07:00


 


Intake Total 1054 ml 75 ml


 


Output Total 750 ml 725 ml


 


Balance 304 ml -650 ml


 


  


 


Intake Oral 420 ml 75 ml


 


IV Total 634 ml 


 


Output Urine Total 750 ml 725 ml


 


# Bowel Movements 4 3








Laboratory Tests


1/1/17 03:40: 


Prothrombin Time 11.2, Prothromb Time International Ratio 1.1, Sodium Level 142

, Potassium Level 4.2, Chloride Level 102, Carbon Dioxide Level 29, Anion Gap 11

, Blood Urea Nitrogen 5L, Creatinine 0.6, Estimat Glomerular Filtration Rate , 

Glucose Level 102, Calcium Level 8.8


1/1/17 11:20: 


White Blood Count 4.7L, Red Blood Count 4.20, Hemoglobin 12.2, Hematocrit 39.7, 

Mean Corpuscular Volume 95, Mean Corpuscular Hemoglobin 29.2, Mean Corpuscular 

Hemoglobin Concent 30.8L, Red Cell Distribution Width 12.9, Platelet Count 144L

, Mean Platelet Volume 7.1, Neutrophils (%) (Auto) 49.0, Lymphocytes (%) (Auto) 

26.7, Monocytes (%) (Auto) 18.7H, Eosinophils (%) (Auto) 4.2H, Basophils (%) (

Auto) 1.5


Height (Feet):  5


Height (Inches):  6.00


Weight (Pounds):  140


General Appearance:  no apparent distress


Cardiovascular:  normal rate, regular rhythm


Respiratory/Chest:  decreased breath sounds


Abdomen:  non tender, soft


Extremities:  trace edema


Neurologic:  alert











KIMBERLY JERNIGAN Jan 1, 2017 22:38

## 2017-01-02 NOTE — CARDIAC ELECTROPHYSIOLOGY PN
Assessment/Plan


Assessment/Plan


1. Shortness of breath, tachycardia as well as D-dimer more than 5000 and 

bilateral LE DVT.  Chest CT angiogram showed no pulmonary embolism. Echo still 

pending.   


2. Hypertension.  Continue Cozaar 50 mg b.i.d.  


3. Pneumonia on ceftriaxone and albuterol.


4. Bilateral LE DVT on Lovenox and Coumadin. S/P IVC filter.





ROMERO RN





Subjective


Subjective


No events overnight. Comfortable in NAD. Off tele now.





Objective





Last 24 Hour Vital Signs








  Date Time  Temp Pulse Resp B/P Pulse Ox O2 Delivery O2 Flow Rate FiO2


 


1/2/17 15:37 97.5 102 18 152/99 96 Room Air  


 


1/2/17 12:00 97.9 92 20 155/84 96 Nasal Cannula  


 


1/2/17 10:28    161/85    


 


1/2/17 08:19     99 Nasal Cannula 2.0 


 


1/2/17 08:19      Nasal Cannula 2.0 


 


1/2/17 08:19  77 18   Nasal Cannula 2.0 


 


1/2/17 08:00 97.7 91 20 161/85 97 Nasal Cannula  


 


1/2/17 04:00 97.7 88 18 147/84 97 Room Air  


 


1/2/17 00:00 97.7 76 18 147/77 99 Room Air  


 


1/1/17 20:49    130/68    


 


1/1/17 20:00 97.8 85 18 150/78 98 Room Air  

















Intake and Output  


 


 1/1/17 1/2/17





 18:59 06:59


 


Intake Total 820 ml 100 ml


 


Output Total 175 ml 850 ml


 


Balance 645 ml -750 ml


 


  


 


Intake Oral 270 ml 100 ml


 


IV Total 550 ml 


 


Output Urine Total 175 ml 850 ml


 


# Bowel Movements 2 1











Laboratory Tests








Test


  1/2/17


04:15 1/2/17


15:15


 


Prothrombin Time


  11.6 SEC


(9.30-11.50)  H 


 


 


Prothromb Time International


Ratio 1.1 (0.9-1.1)  


  


 


 


Vancomycin Level Trough


  


  6.6 ug/mL


(5.0-12.0)











Microbiology








 Date/Time


Source Procedure


Growth Status


 


 


 12/31/16 10:15


Blood Blood Culture - Preliminary


NO GROWTH AFTER 24 HOURS Resulted


 


 12/31/16 10:00


Blood Blood Culture - Preliminary


NO GROWTH AFTER 24 HOURS Resulted








Objective


HEAD AND NECK:  Shows no JVD.


LUNGS:  Coarse rhonchi.


CARDIOVASCULAR:  Regular S1 and S2 with no gallop or murmur.


ABDOMEN:  Soft.


EXTREMITIES:  No pitting edema.











MALCOLM ZULUAGA Jan 2, 2017 17:15

## 2017-01-02 NOTE — PULMONOLOGY PROGRESS NOTE
Assessment/Plan


Problems:  


(1) HCAP (healthcare-associated pneumonia)


(2) Alzheimer's dementia


(3) HTN (hypertension)


(4) DVT (deep venous thrombosis)


(5) Gram positive bacterial infection


Assessment & Plan:  ? contaminant





Assessment/Plan


-Optimize pulmonary hygiene/mobilize as tolerated


-PRN O2


-PRN nebs


-Continue abx per ID, F/U repeat CX's


-Monitor volumes


-Has IVC filter, now on LMWH + VKA per heme-onc (INR 1.1)


-Diet per SLP with STRICT aspiration precautions


-F/U cards recs and TTE - If negative for IE will likely need SEAN


-Full code





Subjective


Allergies:  


Coded Allergies:  


     No Known Allergies (Unverified , 10/4/16)


Subjective


AFVSS


Now on RA


No cough, no sig SOB


Mood better





Objective





Last 24 Hour Vital Signs








  Date Time  Temp Pulse Resp B/P Pulse Ox O2 Delivery O2 Flow Rate FiO2


 


1/2/17 04:00 97.7 88 18 147/84 97 Room Air  


 


1/2/17 00:00 97.7 76 18 147/77 99 Room Air  


 


1/1/17 20:49    130/68    


 


1/1/17 20:00 97.8 85 18 150/78 98 Room Air  


 


1/1/17 16:55  71      


 


1/1/17 16:00 97.9 88 22 138/73 97 Nasal Cannula 2.0 


 


1/1/17 12:00  71      


 


1/1/17 12:00 97.5 78 22 143/57 97 Nasal Cannula 3.0 

















Intake and Output  


 


 1/1/17 1/2/17





 19:00 07:00


 


Intake Total 820 ml 100 ml


 


Output Total 175 ml 850 ml


 


Balance 645 ml -750 ml


 


  


 


Intake Oral 270 ml 100 ml


 


IV Total 550 ml 


 


Output Urine Total 175 ml 850 ml


 


# Bowel Movements 2 1








General Appearance:  no acute distress, cachetic, other - confused


HEENT:  normocephalic, atraumatic, mucous membranes moist


Respiratory/Chest:  lungs clear, normal breath sounds, no respiratory distress


Cardiovascular:  normal peripheral pulses, normal rate, regular rhythm


Abdomen:  normal bowel sounds, soft, non tender, no organomegaly, non distended


Extremities:  no cyanosis, no clubbing, no edema





Microbiology








 Date/Time


Source Procedure


Growth Status


 


 


 12/31/16 10:15


Blood Blood Culture - Preliminary


NO GROWTH AFTER 24 HOURS Resulted


 


 12/31/16 10:00


Blood Blood Culture - Preliminary


NO GROWTH AFTER 24 HOURS Resulted








Laboratory Tests


1/1/17 11:20: 


White Blood Count 4.7L, Red Blood Count 4.20, Hemoglobin 12.2, Hematocrit 39.7, 

Mean Corpuscular Volume 95, Mean Corpuscular Hemoglobin 29.2, Mean Corpuscular 

Hemoglobin Concent 30.8L, Red Cell Distribution Width 12.9, Platelet Count 144L

, Mean Platelet Volume 7.1, Neutrophils (%) (Auto) 49.0, Lymphocytes (%) (Auto) 

26.7, Monocytes (%) (Auto) 18.7H, Eosinophils (%) (Auto) 4.2H, Basophils (%) (

Auto) 1.5


1/2/17 04:15: 


Prothrombin Time 11.6H, Prothromb Time International Ratio 1.1





Current Medications








 Medications


  (Trade)  Dose


 Ordered  Sig/Toribio


 Route


 PRN Reason  Start Time


 Stop Time Status Last Admin


Dose Admin


 


 Acetaminophen


  (Tylenol)  650 mg  Q4H  PRN


 RECTAL


 Fever/Headache/Mild Pain  1/1/17 21:30


 1/31/17 21:29   


 


 


 Acetaminophen/


 Hydrocodone Bitart


  (Norco 10/325)  1 ea  Q6H  PRN


 ORAL


 Pain Scale (6-10)  1/1/17 19:45


 1/8/17 19:44   


 


 


 Albuterol/


 Ipratropium


  (DuoNeb


 0.5-3(2.5)mg/3ml)  3 ml  Q4H  PRN


 HHN


 Shortness of Breath  1/1/17 21:45


 1/6/17 21:44   


 


 


 Azithromycin 500


 mg/Dextrose  250 ml @ 


 250 mls/hr  Q24HRS


 IV


   1/2/17 15:00


 1/5/17 15:01   


 


 


 Ceftriaxone


 Sodium 1 gm/


 Dextrose  50 ml @ 


 100 mls/hr  Q24H


 IVPB


   1/2/17 14:00


 1/9/17 13:59   


 


 


 Clonidine HCl


  (Catapres)  0.1 mg  Q6H  PRN


 ORAL


 For High Blood Pressure  1/1/17 19:45


 1/31/17 19:44   


 


 


 Dextrose


  (Dextrose 50%)    STAT  PRN


 IV


 Hypoglycemia  1/2/17 01:45


 2/1/17 01:44   


 


 


 Diphenhydramine


 HCl


  (Benadryl)  25 mg  Q6H  PRN


 ORAL


 Itching/Pruritis  1/1/17 19:45


 1/31/17 19:44   


 


 


 Docusate Sodium


  (Colace)  100 mg  DAILY


 ORAL


   1/2/17 09:00


 2/1/17 08:59   


 


 


 Enoxaparin Sodium


  (Lovenox)  60 mg  Q12HR@0600,1800


 SUBQ


   1/2/17 06:00


 2/1/17 05:59   


 


 


 Guaifenesin


  (Mucinex)  600 mg  TWICE A  DAY


 ORAL


   1/2/17 09:00


 2/1/17 08:59   


 


 


 Guaifenesin


  (Robitussin)  100 mg  Q4H  PRN


 ORAL


 For Cough  1/1/17 19:00


 1/31/17 18:59   


 


 


 Lorazepam


  (Ativan 2mg/ml


 1ml)  1 mg  Q4H  PRN


 IV


 For Anxiety  1/1/17 21:45


 1/8/17 21:44   


 


 


 Lorazepam


  (Ativan)  1 mg  Q8H  PRN


 ORAL


 For Anxiety  1/2/17 01:45


 1/9/17 01:44   


 


 


 Losartan Potassium


  (Cozaar)  50 mg  Q12HR


 ORAL


   1/1/17 21:00


 1/31/17 20:59  1/1/17 20:49


 


 


 Pantoprazole


  (Protonix)  40 mg  Q12H  PRN


 ORAL


 Abdominal cramps  1/2/17 01:45


 2/1/17 01:44   


 


 


 Vancomycin HCl


  (Vanco rx to


 dose)  1 ea  DAILY  PRN


 MISC


 Per rx protocol  1/2/17 09:00


 2/1/17 08:59   


 


 


 Vancomycin HCl/


 Dextrose


  (Vancomycin/D5W


 250ml)  250 ml @ 


 167 mls/hr  Q24H


 IVPB


   1/2/17 16:00


 1/7/17 15:59   


 


 


 Warfarin Sodium


  (Coumadin per


 pharmacy)  1 ea  DAILY  PRN


 MISC


 Per rx protocol  1/2/17 09:00


 2/1/17 08:59   


 

















SEJAL DEJESUS M.D. Jan 2, 2017 09:13

## 2017-01-02 NOTE — GENERAL PROGRESS NOTE
Assessment/Plan


Assessment/Plan


ASSESSMENT:


#. DVT of the lower extremities - s/p ivc filter and in recent past on coumadin 

--> IVC is not removable


#. Anemia potentially secondary to anemia of chronic disease - improved


#. Leukocytosis. Improved


#. Coagulopathy 2/2 coumadin


#. Trochanteric fracture of the right femur status post open reduction and 

internal fixation and repair pending further evaluation.


#. Sepsis.


#. PNA





RECOMMENDATIONS:


1. Continue coumadin


2. DVT ppx lovenox


3. Goal INR 2-3 


4. Continue antibiotics as needed


5. Transfuse if hemoglobin < 7.5


6. Continue nutritional support.


7. Followup pulm recs


8.  Staff





Thank you,





Daniel Sierra MD





Subjective


Constitutional:  Reports: no symptoms


HEENT:  Reports: no symptoms


Cardiovascular:  Reports: no symptoms


Respiratory:  Reports: no symptoms


Gastrointestinal/Abdominal:  Reports: no symptoms


Genitourinary:  Reports: no symptoms


Neurologic/Psychiatric:  Reports: no symptoms


Endocrine:  Reports: no symptoms


Hematologic/Lymphatic:  Reports: anemia


Allergies:  


Coded Allergies:  


     No Known Allergies (Unverified , 10/4/16)


Subjective


stable, not bleeding, tolerating coumadin well





Objective





Last 24 Hour Vital Signs








  Date Time  Temp Pulse Resp B/P Pulse Ox O2 Delivery O2 Flow Rate FiO2


 


1/2/17 10:28    161/85    


 


1/2/17 08:19     99 Nasal Cannula 2.0 


 


1/2/17 08:19      Nasal Cannula 2.0 


 


1/2/17 08:19  77 18   Nasal Cannula 2.0 


 


1/2/17 08:00 97.7 91 20 161/85 97 Nasal Cannula  


 


1/2/17 04:00 97.7 88 18 147/84 97 Room Air  


 


1/2/17 00:00 97.7 76 18 147/77 99 Room Air  


 


1/1/17 20:49    130/68    


 


1/1/17 20:00 97.8 85 18 150/78 98 Room Air  


 


1/1/17 16:55  71      


 


1/1/17 16:00 97.9 88 22 138/73 97 Nasal Cannula 2.0 


 


1/1/17 12:00  71      


 


1/1/17 12:00 97.5 78 22 143/57 97 Nasal Cannula 3.0 

















Intake and Output  


 


 1/1/17 1/2/17





 19:00 07:00


 


Intake Total 820 ml 100 ml


 


Output Total 175 ml 850 ml


 


Balance 645 ml -750 ml


 


  


 


Intake Oral 270 ml 100 ml


 


IV Total 550 ml 


 


Output Urine Total 175 ml 850 ml


 


# Bowel Movements 2 1








Laboratory Tests


1/2/17 04:15: 


Prothrombin Time 11.6H, Prothromb Time International Ratio 1.1


Height (Feet):  5


Height (Inches):  6.00


Weight (Pounds):  140


General Appearance:  no apparent distress


Neck:  supple


Cardiovascular:  normal rate


Respiratory/Chest:  normal breath sounds


Abdomen:  no mass


Extremities:  non-tender


Edema:  no edema noted Leg (L), no edema noted Leg (R)


Edema:  mild edema


Neurologic:  alert


Skin:  normal pigmentation











Daniel Sierra Jan 2, 2017 11:36

## 2017-01-02 NOTE — INFECTIOUS DISEASES PROG NOTE
Assessment/Plan


Assessment/Plan





ASSESSMENT: 86-year-old female with:





CONS bacteremia 4/4 r/o SBE, septic thrombophlebitis - repeat BCx NGTD, TTE 

pending


Pneumonia ?septic emboli - SCx NRF


  Chest CT: Diffuse bilateral interstitial disease, possibly with micro-

nodularity, disseminated inflammatory lesions a possibility.


UTI , probable - UCx(-)


Fever - resolved, no leukocytosis








Acute BLE DVT SP IVC filter


CVA.


History of brain aneurysm.


Dementia.


NKDA


Full Code








PLAN:





Cont  IV Rocephin d # 5, azithro, IV Vanco d# 4


f/u TTE


Monitor blood cultures (blood) 


Chest x-ray.


Monitor CBC and BMP





Subjective


Allergies:  


Coded Allergies:  


     No Known Allergies (Unverified , 10/4/16)


Subjective





fevers resolved


transferred 4E





Objective


Vital Signs





Last 24 Hour Vital Signs








  Date Time  Temp Pulse Resp B/P Pulse Ox O2 Delivery O2 Flow Rate FiO2


 


1/2/17 04:00 97.7 88 18 147/84 97 Room Air  


 


1/2/17 00:00 97.7 76 18 147/77 99 Room Air  


 


1/1/17 20:49    130/68    


 


1/1/17 20:00 97.8 85 18 150/78 98 Room Air  


 


1/1/17 16:55  71      


 


1/1/17 16:00 97.9 88 22 138/73 97 Nasal Cannula 2.0 


 


1/1/17 12:00  71      


 


1/1/17 12:00 97.5 78 22 143/57 97 Nasal Cannula 3.0 








Height (Feet):  5


Height (Inches):  6.00


Weight (Pounds):  140


General Appearance:  no acute distress


Respiratory/Chest:  no respiratory distress


Cardiovascular:  normal rate, regular rhythm


Abdomen:  normal bowel sounds, soft, non tender, non distended





Microbiology








 Date/Time


Source Procedure


Growth Status


 


 


 12/31/16 10:15


Blood Blood Culture - Preliminary


NO GROWTH AFTER 24 HOURS Resulted


 


 12/31/16 10:00


Blood Blood Culture - Preliminary


NO GROWTH AFTER 24 HOURS Resulted











Laboratory Tests








Test


  1/1/17


11:20 1/2/17


04:15


 


White Blood Count


  4.7 K/UL


(4.8-10.8)  L 


 


 


Red Blood Count


  4.20 M/UL


(4.20-5.40) 


 


 


Hemoglobin


  12.2 G/DL


(12.0-16.0) 


 


 


Hematocrit


  39.7 %


(37.0-47.0) 


 


 


Mean Corpuscular Volume 95 FL (80-99)   


 


Mean Corpuscular Hemoglobin


  29.2 PG


(27.0-31.0) 


 


 


Mean Corpuscular Hemoglobin


Concent 30.8 G/DL


(32.0-36.0)  L 


 


 


Red Cell Distribution Width


  12.9 %


(11.6-14.8) 


 


 


Platelet Count


  144 K/UL


(150-450)  L 


 


 


Mean Platelet Volume


  7.1 FL


(6.5-10.1) 


 


 


Neutrophils (%) (Auto)


  49.0 %


(45.0-75.0) 


 


 


Lymphocytes (%) (Auto)


  26.7 %


(20.0-45.0) 


 


 


Monocytes (%) (Auto)


  18.7 %


(1.0-10.0)  H 


 


 


Eosinophils (%) (Auto)


  4.2 %


(0.0-3.0)  H 


 


 


Basophils (%) (Auto)


  1.5 %


(0.0-2.0) 


 


 


Prothrombin Time


  


  11.6 SEC


(9.30-11.50)  H


 


Prothromb Time International


Ratio 


  1.1 (0.9-1.1)  


 











Current Medications








 Medications


  (Trade)  Dose


 Ordered  Sig/Toribio


 Route


 PRN Reason  Start Time


 Stop Time Status Last Admin


Dose Admin


 


 Acetaminophen


  (Tylenol)  650 mg  Q4H  PRN


 RECTAL


 Fever/Headache/Mild Pain  1/1/17 21:30


 1/31/17 21:29   


 


 


 Acetaminophen/


 Hydrocodone Bitart


  (Norco 10/325)  1 ea  Q6H  PRN


 ORAL


 Pain Scale (6-10)  1/1/17 19:45


 1/8/17 19:44   


 


 


 Albuterol/


 Ipratropium


  (DuoNeb


 0.5-3(2.5)mg/3ml)  3 ml  Q4H  PRN


 HHN


 Shortness of Breath  1/1/17 21:45


 1/6/17 21:44   


 


 


 Azithromycin 500


 mg/Dextrose  250 ml @ 


 250 mls/hr  Q24HRS


 IV


   1/2/17 15:00


 1/5/17 15:01   


 


 


 Ceftriaxone


 Sodium 1 gm/


 Dextrose  50 ml @ 


 100 mls/hr  Q24H


 IVPB


   1/2/17 14:00


 1/9/17 13:59   


 


 


 Clonidine HCl


  (Catapres)  0.1 mg  Q6H  PRN


 ORAL


 For High Blood Pressure  1/1/17 19:45


 1/31/17 19:44   


 


 


 Dextrose


  (Dextrose 50%)    STAT  PRN


 IV


 Hypoglycemia  1/2/17 01:45


 2/1/17 01:44   


 


 


 Diphenhydramine


 HCl


  (Benadryl)  25 mg  Q6H  PRN


 ORAL


 Itching/Pruritis  1/1/17 19:45


 1/31/17 19:44   


 


 


 Docusate Sodium


  (Colace)  100 mg  DAILY


 ORAL


   1/2/17 09:00


 2/1/17 08:59   


 


 


 Enoxaparin Sodium


  (Lovenox)  60 mg  Q12HR@0600,1800


 SUBQ


   1/2/17 06:00


 2/1/17 05:59   


 


 


 Guaifenesin


  (Mucinex)  600 mg  TWICE A  DAY


 ORAL


   1/2/17 09:00


 2/1/17 08:59   


 


 


 Guaifenesin


  (Robitussin)  100 mg  Q4H  PRN


 ORAL


 For Cough  1/1/17 19:00


 1/31/17 18:59   


 


 


 Lorazepam


  (Ativan 2mg/ml


 1ml)  1 mg  Q4H  PRN


 IV


 For Anxiety  1/1/17 21:45


 1/8/17 21:44   


 


 


 Lorazepam


  (Ativan)  1 mg  Q8H  PRN


 ORAL


 For Anxiety  1/2/17 01:45


 1/9/17 01:44   


 


 


 Losartan Potassium


  (Cozaar)  50 mg  Q12HR


 ORAL


   1/1/17 21:00


 1/31/17 20:59  1/1/17 20:49


 


 


 Pantoprazole


  (Protonix)  40 mg  Q12H  PRN


 ORAL


 Abdominal cramps  1/2/17 01:45


 2/1/17 01:44   


 


 


 Vancomycin HCl


  (Vanco rx to


 dose)  1 ea  DAILY  PRN


 MISC


 Per rx protocol  1/2/17 09:00


 2/1/17 08:59   


 


 


 Vancomycin HCl/


 Dextrose


  (Vancomycin/D5W


 250ml)  250 ml @ 


 167 mls/hr  Q24H


 IVPB


   1/2/17 16:00


 1/7/17 15:59   


 


 


 Warfarin Sodium


  (Coumadin per


 pharmacy)  1 ea  DAILY  PRN


 MISC


 Per rx protocol  1/2/17 09:00


 2/1/17 08:59   


 

















CECY BREWER Jan 2, 2017 08:55

## 2017-01-03 NOTE — NEPHROLOGY PROGRESS NOTE
Assessment/Plan


Problem List:  


(1) Hypokalemia


Assessment:  corrected.





(2) DVT (deep venous thrombosis)


(3) HTN (hypertension)


(4) Alzheimer's dementia


(5) Dysphagia


(6) Depression


(7) CVA (cerebral vascular accident)


(8) Brain aneurysm


(9) HCAP (healthcare-associated pneumonia)


(10) Dyspnea


(11) Congestive heart failure (CHF)


(12) Gram positive bacterial infection


Plan


abx per ID. 


pulm hygiene. 


f/u recs per pulm and cardio.





Subjective


Subjective


appears comfortable. no acute events.





Objective


Objective





Last 24 Hour Vital Signs








  Date Time  Temp Pulse Resp B/P Pulse Ox O2 Delivery O2 Flow Rate FiO2


 


1/3/17 21:59    143/85    


 


1/3/17 19:44 97.9 93 16 143/85 97 Room Air  


 


1/3/17 19:31      Nasal Cannula 2.0 28


 


1/3/17 19:31     98 Nasal Cannula 2.0 28


 


1/3/17 19:29  91 18   Nasal Cannula 2.0 28


 


1/3/17 15:57 97.5 91 18 184/95 96 Room Air  


 


1/3/17 15:55    178/96    


 


1/3/17 11:39 97.5 82 20 153/77 95 Nasal Cannula 2.0 


 


1/3/17 08:16 97.3 99 21 154/93 95 Nasal Cannula 2.0 


 


1/3/17 08:14  99 18   Nasal Cannula 2.0 28


 


1/3/17 08:14     95 Nasal Cannula 2.0 28


 


1/3/17 08:14      Nasal Cannula 2.0 28


 


1/3/17 04:00 97.9 88 18 156/88 98 Room Air  


 


1/3/17 00:00 97.0 85 18 154/90 97 Nasal Cannula  

















Intake and Output  


 


 1/2/17 1/3/17





 19:00 07:00


 


Intake Total 250 ml 400 ml


 


Output Total 200 ml 1100 ml


 


Balance 50 ml -700 ml


 


  


 


Intake Oral 250 ml 400 ml


 


Output Urine Total 200 ml 1100 ml








Laboratory Tests


1/3/17 06:50: 


Prothrombin Time 12.4H, Prothromb Time International Ratio 1.2H


Height (Feet):  5


Height (Inches):  6.00


Weight (Pounds):  140


General Appearance:  no apparent distress


Cardiovascular:  normal rate, regular rhythm


Respiratory/Chest:  rhonchi - bilaterally


Abdomen:  non tender, soft, no organomegaly


Extremities:  trace edema


Neurologic:  alert











KIMBERLY JERNIGAN Tom 3, 2017 22:32

## 2017-01-03 NOTE — PULMONOLOGY PROGRESS NOTE
Assessment/Plan


Problems:  


(1) HCAP (healthcare-associated pneumonia)


(2) Alzheimer's dementia


(3) HTN (hypertension)


(4) DVT (deep venous thrombosis)


(5) Gram positive bacterial infection


Assessment & Plan:  ? contaminant





Assessment/Plan


-Optimize pulmonary hygiene/mobilize as tolerated


-PRN O2


-PRN nebs


-Continue abx per ID, F/U repeat CX's (NGSF)


-Monitor volumes


-Has IVC filter, now on LMWH + VKA per heme-onc (INR 1.2)


-Diet per SLP with STRICT aspiration precautions


-F/U cards recs and TTE - If negative for IE will likely need SEAN


-Full code





Subjective


Allergies:  


Coded Allergies:  


     No Known Allergies (Unverified , 10/4/16)


Subjective


AFVSS


on RA -2L, repeat CXR SETPHANIE


No cough, no sig SOB


INR 1.2





Objective





Last 24 Hour Vital Signs








  Date Time  Temp Pulse Resp B/P Pulse Ox O2 Delivery O2 Flow Rate FiO2


 


1/3/17 15:57 97.5 91 18 184/95 96 Room Air  


 


1/3/17 15:55    178/96    


 


1/3/17 11:39 97.5 82 20 153/77 95 Nasal Cannula 2.0 


 


1/3/17 08:16 97.3 99 21 154/93 95 Nasal Cannula 2.0 


 


1/3/17 08:14  99 18   Nasal Cannula 2.0 28


 


1/3/17 08:14     95 Nasal Cannula 2.0 28


 


1/3/17 08:14      Nasal Cannula 2.0 28


 


1/3/17 04:00 97.9 88 18 156/88 98 Room Air  


 


1/3/17 00:00 97.0 85 18 154/90 97 Nasal Cannula  


 


1/2/17 21:17    155/85    


 


1/2/17 20:00 97.7 70 17 155/85 98 Room Air  


 


1/2/17 19:02     98 Nasal Cannula 2.0 


 


1/2/17 19:02      Nasal Cannula 2.0 


 


1/2/17 19:02  70 18   Nasal Cannula 2.0 

















Intake and Output  


 


 1/2/17 1/3/17





 19:00 07:00


 


Intake Total 250 ml 400 ml


 


Output Total 200 ml 1100 ml


 


Balance 50 ml -700 ml


 


  


 


Intake Oral 250 ml 400 ml


 


Output Urine Total 200 ml 1100 ml








General Appearance:  no acute distress, cachetic


HEENT:  normocephalic, mucous membranes moist


Respiratory/Chest:  rhonchi - scatttered


Cardiovascular:  normal peripheral pulses, normal rate, regular rhythm


Abdomen:  normal bowel sounds, soft, non tender, no organomegaly, non distended

, no mass


Extremities:  no cyanosis, no clubbing, no edema


Laboratory Tests


1/3/17 06:50: 


Prothrombin Time 12.4H, Prothromb Time International Ratio 1.2H





Current Medications








 Medications


  (Trade)  Dose


 Ordered  Sig/Toribio


 Route


 PRN Reason  Start Time


 Stop Time Status Last Admin


Dose Admin


 


 Acetaminophen


  (Tylenol)  650 mg  Q4H  PRN


 RECTAL


 Fever/Headache/Mild Pain  1/1/17 21:30


 1/31/17 21:29   


 


 


 Acetaminophen/


 Hydrocodone Bitart


  (Norco 10/325)  1 ea  Q6H  PRN


 ORAL


 Pain Scale (6-10)  1/1/17 19:45


 1/8/17 19:44  1/3/17 15:53


 


 


 Albuterol/


 Ipratropium


  (DuoNeb


 0.5-3(2.5)mg/3ml)  3 ml  Q4H  PRN


 HHN


 Shortness of Breath  1/1/17 21:45


 1/6/17 21:44   


 


 


 Azithromycin 500


 mg/Dextrose  250 ml @ 


 250 mls/hr  Q24HRS


 IV


   1/2/17 15:00


 1/5/17 15:01  1/3/17 14:42


 


 


 Ceftriaxone


 Sodium/Dextrose


  (Rocephin/D5W


 50ml)  50 ml @ 


 100 mls/hr  Q24H


 IVPB


   1/2/17 14:00


 1/9/17 13:59  1/3/17 13:54


 


 


 Clonidine HCl


  (Catapres)  0.1 mg  Q6H  PRN


 ORAL


 For High Blood Pressure  1/1/17 19:45


 1/31/17 19:44  1/3/17 15:55


 


 


 Dextrose


  (Dextrose 50%)    STAT  PRN


 IV


 Hypoglycemia  1/2/17 01:45


 2/1/17 01:44   


 


 


 Diphenhydramine


 HCl


  (Benadryl)  25 mg  Q6H  PRN


 ORAL


 Itching/Pruritis  1/1/17 19:45


 1/31/17 19:44   


 


 


 Docusate Sodium


  (Colace)  100 mg  DAILY


 ORAL


   1/2/17 09:00


 2/1/17 08:59  1/2/17 10:27


 


 


 Enoxaparin Sodium


  (Lovenox)  60 mg  Q12HR@0600,1800


 SUBQ


   1/2/17 06:00


 2/1/17 05:59  1/3/17 07:56


 


 


 Guaifenesin


  (Robitussin)  100 mg  Q4H  PRN


 ORAL


 For Cough  1/1/17 19:00


 1/31/17 18:59  1/2/17 21:17


 


 


 Lorazepam


  (Ativan 2mg/ml


 1ml)  1 mg  Q4H  PRN


 IV


 For Anxiety  1/1/17 21:45


 1/8/17 21:44   


 


 


 Lorazepam


  (Ativan)  1 mg  Q8H  PRN


 ORAL


 For Anxiety  1/2/17 01:45


 1/9/17 01:44   


 


 


 Losartan Potassium


  (Cozaar)  50 mg  Q12HR


 ORAL


   1/1/17 21:00


 1/31/17 20:59  1/2/17 21:17


 


 


 Pantoprazole


  (Protonix)  40 mg  Q12H  PRN


 ORAL


 Abdominal cramps  1/2/17 01:45


 2/1/17 01:44   


 


 


 Vancomycin HCl


  (Vanco rx to


 dose)  1 ea  DAILY  PRN


 MISC


 Per rx protocol  1/2/17 09:00


 2/1/17 08:59   


 


 


 Vancomycin HCl/


 Dextrose


  (Vancomycin/D5W


 250ml)  250 ml @ 


 167 mls/hr  Q12HR@0500,1700


 IVPB


   1/3/17 05:00


 1/8/17 04:59  1/3/17 17:29


 


 


 Warfarin Sodium 1


 ea  1 ea  DAILY  PRN


 MISC


 Per rx protocol  1/2/17 09:00


 2/1/17 08:59   


 

















SEJAL DEJESUS M.D. Tom 3, 2017 18:26

## 2017-01-03 NOTE — GENERAL PROGRESS NOTE
Assessment/Plan


Assessment/Plan


ASSESSMENT:


#. DVT of the bilateral lower extremities - s/p ivc permanent IVC filter and 

coumadin now restarted


#. Anemia potentially secondary to anemia of chronic disease - improved


#. Leukocytosis. Improved


#. Coagulopathy 2/2 coumadin


#. Trochanteric fracture of the right femur status post open reduction and 

internal fixation and repair


#. Sepsis.


#. PNA





RECOMMENDATIONS:


1. Continue coumadin


2. Continue bridge ppx lovenox


3. Goal INR 2-3 


4. Continue antibiotics as needed


5. Transfuse if hemoglobin < 7.5


6. Continue nutritional support.


7. Followup pulm, cards recs


8.  Staff





Thank you,





Daniel Sierra MD





Subjective


Constitutional:  Reports: no symptoms


HEENT:  Reports: no symptoms


Cardiovascular:  Reports: no symptoms


Respiratory:  Reports: no symptoms


Gastrointestinal/Abdominal:  Reports: poor appetite


Genitourinary:  Reports: no symptoms


Neurologic/Psychiatric:  Reports: no symptoms


Endocrine:  Reports: no symptoms


Hematologic/Lymphatic:  Reports: anemia


Allergies:  


Coded Allergies:  


     No Known Allergies (Unverified , 10/4/16)


Subjective


stable, not bleeding, tolerating coumadin, however this am refusing meds





Objective





Last 24 Hour Vital Signs








  Date Time  Temp Pulse Resp B/P Pulse Ox O2 Delivery O2 Flow Rate FiO2


 


1/3/17 08:16 97.3 99 21 154/93 95 Nasal Cannula 2.0 


 


1/3/17 04:00 97.9 88 18 156/88 98 Room Air  


 


1/3/17 00:00 97.0 85 18 154/90 97 Nasal Cannula  


 


1/2/17 21:17    155/85    


 


1/2/17 20:00 97.7 70 17 155/85 98 Room Air  


 


1/2/17 19:02     98 Nasal Cannula 2.0 


 


1/2/17 19:02      Nasal Cannula 2.0 


 


1/2/17 19:02  70 18   Nasal Cannula 2.0 


 


1/2/17 15:37 97.5 102 18 152/99 96 Room Air  


 


1/2/17 12:00 97.9 92 20 155/84 96 Nasal Cannula  


 


1/2/17 10:28    161/85    

















Intake and Output  


 


 1/2/17 1/3/17





 18:59 06:59


 


Intake Total 250 ml 400 ml


 


Output Total 200 ml 1100 ml


 


Balance 50 ml -700 ml


 


  


 


Intake Oral 250 ml 400 ml


 


Output Urine Total 200 ml 1100 ml








Laboratory Tests


1/2/17 15:15: Vancomycin Level Trough 6.6


1/3/17 06:50: 


Prothrombin Time 12.4H, Prothromb Time International Ratio 1.2H


Height (Feet):  5


Height (Inches):  6.00


Weight (Pounds):  140


General Appearance:  no apparent distress


EENT:  TMs normal


Neck:  normal inspection


Cardiovascular:  regular rhythm


Respiratory/Chest:  lungs clear


Abdomen:  non tender


Extremities:  non-tender


Edema:  1+ Leg (L), 1+ Leg (R)


Edema:  mild edema


Neurologic:  alert


Skin:  warm/dry











Daniel Sierra Tom 3, 2017 09:36

## 2017-01-03 NOTE — DIAGNOSTIC IMAGING REPORT
Indication: Chest Pain



Comparison:  12/29/16



A single view chest radiograph was obtained.



Findings:



Lungs are essentially clear. Heart size is borderline enlarged. Bones are osteopenic.



Impression:



No acute disease

## 2017-01-03 NOTE — CARDIAC ELECTROPHYSIOLOGY PN
Assessment/Plan


Assessment/Plan


1. Shortness of breath, tachycardia as well as D-dimer more than 5000 and 

bilateral LE DVT.  Chest CT angiogram showed no pulmonary embolism. Echo  

pending.   


2. Hypertension. On Cozaar 50 mg b.i.d.  


3. Pneumonia on ceftriaxone and albuterol.


4. Bilateral LE DVT on Lovenox and Coumadin. S/P IVC filter.





DW RN





Subjective


Subjective


Comfortable in NAD. Being fed by sitter.





Objective





Last 24 Hour Vital Signs








  Date Time  Temp Pulse Resp B/P Pulse Ox O2 Delivery O2 Flow Rate FiO2


 


1/3/17 15:57 97.5 91 18 184/95 96 Room Air  


 


1/3/17 15:55    178/96    


 


1/3/17 11:39 97.5 82 20 153/77 95 Nasal Cannula 2.0 


 


1/3/17 08:16 97.3 99 21 154/93 95 Nasal Cannula 2.0 


 


1/3/17 08:14  99 18   Nasal Cannula 2.0 28


 


1/3/17 08:14     95 Nasal Cannula 2.0 28


 


1/3/17 08:14      Nasal Cannula 2.0 28


 


1/3/17 04:00 97.9 88 18 156/88 98 Room Air  


 


1/3/17 00:00 97.0 85 18 154/90 97 Nasal Cannula  


 


1/2/17 21:17    155/85    


 


1/2/17 20:00 97.7 70 17 155/85 98 Room Air  


 


1/2/17 19:02     98 Nasal Cannula 2.0 


 


1/2/17 19:02      Nasal Cannula 2.0 


 


1/2/17 19:02  70 18   Nasal Cannula 2.0 

















Intake and Output  


 


 1/2/17 1/3/17





 19:00 07:00


 


Intake Total 250 ml 400 ml


 


Output Total 200 ml 1100 ml


 


Balance 50 ml -700 ml


 


  


 


Intake Oral 250 ml 400 ml


 


Output Urine Total 200 ml 1100 ml











Laboratory Tests








Test


  1/3/17


06:50


 


Prothrombin Time


  12.4 SEC


(9.30-11.50)  H


 


Prothromb Time International


Ratio 1.2 (0.9-1.1)


H








Objective


HEAD AND NECK:  Shows no JVD.


LUNGS:  Coarse rhonchi.


CARDIOVASCULAR:  Regular S1 and S2 with no gallop or murmur.


ABDOMEN:  Soft.


EXTREMITIES:  No pitting edema.











MALCOLM ZULUAGA Tom 3, 2017 17:34

## 2017-01-03 NOTE — INFECTIOUS DISEASES PROG NOTE
Assessment/Plan


Assessment/Plan


ASSESSMENT: 86-year-old female with:





CONS bacteremia 4/4 r/o SBE, septic thrombophlebitis - repeat BCx NGTD, TTE 

pending


Pneumonia  SCx NRF


  Chest CT: Diffuse bilateral interstitial disease, possibly with micro-

nodularity, disseminated inflammatory lesions a possibility.


UTI , probable - UCx(-)


Fever - resolved, no leukocytosis








Acute BLE DVT SP IVC filter


CVA.


History of brain aneurysm.


Dementia.


NKDA


Full Code








PLAN:





Cont  IV Rocephin d #6 /7  , azithro, IV Vanco d# 5 / 7 


f/u TTE


Monitor blood cultures (blood) 


Chest x-ray.


Monitor CBC and BMP





Subjective


Constitutional:  Denies: anorexia, chills, drenching sweats, fatigue, fever, no 

symptoms, other


Allergies:  


Coded Allergies:  


     No Known Allergies (Unverified , 10/4/16)





Objective


Vital Signs





Last 24 Hour Vital Signs








  Date Time  Temp Pulse Resp B/P Pulse Ox O2 Delivery O2 Flow Rate FiO2


 


1/3/17 08:16 97.3 99 21 154/93 95 Nasal Cannula 2.0 


 


1/3/17 04:00 97.9 88 18 156/88 98 Room Air  


 


1/3/17 00:00 97.0 85 18 154/90 97 Nasal Cannula  


 


1/2/17 21:17    155/85    


 


1/2/17 20:00 97.7 70 17 155/85 98 Room Air  


 


1/2/17 19:02     98 Nasal Cannula 2.0 


 


1/2/17 19:02      Nasal Cannula 2.0 


 


1/2/17 19:02  70 18   Nasal Cannula 2.0 


 


1/2/17 15:37 97.5 102 18 152/99 96 Room Air  


 


1/2/17 12:00 97.9 92 20 155/84 96 Nasal Cannula  


 


1/2/17 10:28    161/85    








Height (Feet):  5


Height (Inches):  6.00


Weight (Pounds):  140


HEENT:  anicteric


Respiratory/Chest:  lungs clear, normal breath sounds


Cardiovascular:  normal rate, regular rhythm


Abdomen:  soft, non tender, no organomegaly





Microbiology








 Date/Time


Source Procedure


Growth Status


 


 


 12/31/16 10:15


Blood Blood Culture - Preliminary


NO GROWTH AFTER 48 HOURS Resulted


 


 12/31/16 10:00


Blood Blood Culture - Preliminary


NO GROWTH AFTER 48 HOURS Resulted











Laboratory Tests








Test


  1/2/17


15:15 1/3/17


06:50


 


Vancomycin Level Trough


  6.6 ug/mL


(5.0-12.0) 


 


 


Prothrombin Time


  


  12.4 SEC


(9.30-11.50)  H


 


Prothromb Time International


Ratio 


  1.2 (0.9-1.1)


H











Current Medications








 Medications


  (Trade)  Dose


 Ordered  Sig/Toribio


 Route


 PRN Reason  Start Time


 Stop Time Status Last Admin


Dose Admin


 


 Acetaminophen


  (Tylenol)  650 mg  Q4H  PRN


 RECTAL


 Fever/Headache/Mild Pain  1/1/17 21:30


 1/31/17 21:29   


 


 


 Acetaminophen/


 Hydrocodone Bitart


  (Norco 10/325)  1 ea  Q6H  PRN


 ORAL


 Pain Scale (6-10)  1/1/17 19:45


 1/8/17 19:44   


 


 


 Albuterol/


 Ipratropium


  (DuoNeb


 0.5-3(2.5)mg/3ml)  3 ml  Q4H  PRN


 HHN


 Shortness of Breath  1/1/17 21:45


 1/6/17 21:44   


 


 


 Azithromycin 500


 mg/Dextrose  250 ml @ 


 250 mls/hr  Q24HRS


 IV


   1/2/17 15:00


 1/5/17 15:01  1/2/17 14:46


 


 


 Ceftriaxone


 Sodium/Dextrose


  (Rocephin/D5W


 50ml)  50 ml @ 


 100 mls/hr  Q24H


 IVPB


   1/2/17 14:00


 1/9/17 13:59  1/2/17 13:53


 


 


 Clonidine HCl


  (Catapres)  0.1 mg  Q6H  PRN


 ORAL


 For High Blood Pressure  1/1/17 19:45


 1/31/17 19:44   


 


 


 Dextrose


  (Dextrose 50%)    STAT  PRN


 IV


 Hypoglycemia  1/2/17 01:45


 2/1/17 01:44   


 


 


 Diphenhydramine


 HCl


  (Benadryl)  25 mg  Q6H  PRN


 ORAL


 Itching/Pruritis  1/1/17 19:45


 1/31/17 19:44   


 


 


 Docusate Sodium


  (Colace)  100 mg  DAILY


 ORAL


   1/2/17 09:00


 2/1/17 08:59  1/2/17 10:27


 


 


 Enoxaparin Sodium


  (Lovenox)  60 mg  Q12HR@0600,1800


 SUBQ


   1/2/17 06:00


 2/1/17 05:59  1/3/17 07:56


 


 


 Guaifenesin


  (Robitussin)  100 mg  Q4H  PRN


 ORAL


 For Cough  1/1/17 19:00


 1/31/17 18:59  1/2/17 21:17


 


 


 Lorazepam


  (Ativan 2mg/ml


 1ml)  1 mg  Q4H  PRN


 IV


 For Anxiety  1/1/17 21:45


 1/8/17 21:44   


 


 


 Lorazepam


  (Ativan)  1 mg  Q8H  PRN


 ORAL


 For Anxiety  1/2/17 01:45


 1/9/17 01:44   


 


 


 Losartan Potassium


  (Cozaar)  50 mg  Q12HR


 ORAL


   1/1/17 21:00


 1/31/17 20:59  1/2/17 21:17


 


 


 Pantoprazole


  (Protonix)  40 mg  Q12H  PRN


 ORAL


 Abdominal cramps  1/2/17 01:45


 2/1/17 01:44   


 


 


 Vancomycin HCl


  (Vanco rx to


 dose)  1 ea  DAILY  PRN


 MISC


 Per rx protocol  1/2/17 09:00


 2/1/17 08:59   


 


 


 Vancomycin HCl/


 Dextrose


  (Vancomycin/D5W


 250ml)  250 ml @ 


 167 mls/hr  Q12HR@0500,1700


 IVPB


   1/3/17 05:00


 1/8/17 04:59  1/3/17 05:57


 


 


 Warfarin Sodium


  (Coumadin)  5 mg  COUMADIN  ONCE


 ORAL


   1/3/17 17:00


 1/3/17 17:01   


 


 


 Warfarin Sodium 1


 ea  1 ea  DAILY  PRN


 MISC


 Per rx protocol  1/2/17 09:00


 2/1/17 08:59   


 

















BRIANDA PAYNE M.D. Tom 3, 2017 09:53

## 2017-01-03 NOTE — CONSULTATION
DATE OF CONSULTATION:  01/02/2017



PSYCHOTHERAPY CONSULTATION PROGRESS NOTE



TREATING ATTENDING PHYSICIAN:  Jermaine Milligan M.D.



HISTORY OF PRESENT ILLNESS:  The patient is an 86-year-old female,

admitted to the hospital for aspiration pneumonia.  The patient has been

very disorganized, confused, altered mental status, slightly irritable,

agitated, irritated mood and affect and her agitation level has increased

today.  The patient seems more confused more today than she is actually.

The patient has a history of dementia and depression, _____.  The patient

is agitated, irritable, confused, disorganized, _____.



PAST MEDICAL HISTORY:  Includes history of dementia, hypertension,

depression, brain aneurysm, dysphagia, and CVA.



ALLERGIES:  The patient has no known drug allergies.



SUBSTANCE ABUSE HISTORY:  There is no indication of alcohol, illicit

substance use, or smoking cigarettes.



PSYCHIATRIC HISTORY:  The patient has a history of depression and

dementia.



SOCIAL HISTORY:  The patient is an 86-year-old female, financially

sustained through [employer].



MENTAL STATUS EXAMINATION:  The patient is alert and oriented x2.

Mood is irritable.  Affect labile.  Thought process is disorganized.

Thought content, confused.  The patient has poor attention and

concentration.  Poor insight, judgment, and impulse control.



DIAGNOSES:

Axis I  Rule out major depressive disorder with psychotic features and rule

out _____02:16.

Axis II  Deferred.

Axis III  Per History and Physical.



This clinician assessed this patient.  Provided the patient with

supportive psychotherapy and reality orientation.  Continue with

medication management and behavioral management.  This clinician has

reviewed the patient's chart.  Discussed the treatment with the nursing

staff.









  ______________________________________________

  Vinnie Rangel PsyD.





DR:  TACOS

D:  01/02/2017 16:52

T:  01/03/2017 01:37

JOB#:  [id number]

CC:

## 2017-01-03 NOTE — DIAGNOSTIC IMAGING REPORT
--------------- APPROVED REPORT --------------





CPT Code: 25238



Present Symptoms

Lower Extremity Pain:   

Lower Extremity Edema: Right  

Shortness of breath 





BILATERAL: Venous imaging reveals acute thrombus in the common femoral to popliteal veins 

bilaterally.  Imaging reveals patency of the calf veins bilaterally. The greater 

saphenous vein is within normal limits bilaterally. 

KRUPA KOVACS was notified of abnormal results at 1437 hours.

## 2017-01-04 NOTE — CARDIAC ELECTROPHYSIOLOGY PN
Assessment/Plan


Assessment/Plan


1. Shortness of breath and bilateral LE DVT.  Chest CT angiogram showed no 

pulmonary embolism.Reoredered Echo  


2. Hypertension. On Cozaar 50 mg b.i.d.  


3. Pneumonia on ceftriaxone and albuterol.


4. Bilateral LE DVT on Lovenox and Coumadin. S/P IVC filter.





ROMERO RN





Subjective


Subjective


Comfortable in NAD. Sitter at bedside. No new events..





Objective





Last 24 Hour Vital Signs








  Date Time  Temp Pulse Resp B/P Pulse Ox O2 Delivery O2 Flow Rate FiO2


 


1/4/17 11:59    128/71    


 


1/4/17 11:48 96.4 80 20 128/71 99 Nasal Cannula 2.0 


 


1/4/17 11:36    136/77    


 


1/4/17 10:15      Room Air  88


 


1/4/17 09:55      Nasal Cannula 2.0 28


 


1/4/17 09:55  93 18   Nasal Cannula 2.0 28


 


1/4/17 09:55     93 Nasal Cannula 2.0 28


 


1/4/17 07:52 96.8 82 18 136/77 98 Nasal Cannula 2.0 


 


1/4/17 04:00 97.8 80 18 156/86 97 Nasal Cannula 2.0 


 


1/4/17 00:00 97.7 94 19 162/95 96 Nasal Cannula 2.0 


 


1/3/17 21:59    143/85    


 


1/3/17 19:44 97.9 93 16 143/85 97 Room Air  


 


1/3/17 19:31      Nasal Cannula 2.0 28


 


1/3/17 19:31     98 Nasal Cannula 2.0 28


 


1/3/17 19:29  91 18   Nasal Cannula 2.0 28

















Intake and Output  


 


 1/3/17 1/4/17





 19:00 07:00


 


Intake Total 240 ml 840 ml


 


Output Total 300 ml 1300 ml


 


Balance -60 ml -460 ml


 


  


 


Intake Oral 240 ml 590 ml


 


IV Total  250 ml


 


Output Urine Total 300 ml 1300 ml











Laboratory Tests








Test


  1/4/17


04:00


 


White Blood Count


  7.5 K/UL


(4.8-10.8)


 


Red Blood Count


  4.49 M/UL


(4.20-5.40)


 


Hemoglobin


  13.2 G/DL


(12.0-16.0)


 


Hematocrit


  41.7 %


(37.0-47.0)


 


Mean Corpuscular Volume 93 FL (80-99)  


 


Mean Corpuscular Hemoglobin


  29.3 PG


(27.0-31.0)


 


Mean Corpuscular Hemoglobin


Concent 31.6 G/DL


(32.0-36.0)  L


 


Red Cell Distribution Width


  12.5 %


(11.6-14.8)


 


Platelet Count


  165 K/UL


(150-450)


 


Mean Platelet Volume


  7.7 FL


(6.5-10.1)


 


Neutrophils (%) (Auto)


  52.7 %


(45.0-75.0)


 


Lymphocytes (%) (Auto)


  30.6 %


(20.0-45.0)


 


Monocytes (%) (Auto)


  10.4 %


(1.0-10.0)  H


 


Eosinophils (%) (Auto)


  5.4 %


(0.0-3.0)  H


 


Basophils (%) (Auto)


  0.9 %


(0.0-2.0)


 


Prothrombin Time


  17.2 SEC


(9.30-11.50)  H


 


Prothromb Time International


Ratio 1.7 (0.9-1.1)


H


 


Sodium Level


  142 mEQ/L


(135-145)


 


Potassium Level


  3.3 mEQ/L


(3.4-4.9)  L


 


Chloride Level


  100 mEQ/L


()


 


Carbon Dioxide Level


  32 mEQ/L


(20-30)  H


 


Anion Gap 10 (5-15)  


 


Blood Urea Nitrogen


  5 mg/dL (7-23)


L


 


Creatinine


  0.5 mg/dL


(0.5-0.9)


 


Estimat Glomerular Filtration


Rate  mL/min (>60)  


 


 


Glucose Level


  104 mg/dL


()


 


Calcium Level


  9.2 mg/dL


(8.6-10.2)


 


Pro-B-Type Natriuretic Peptide


  1044 pg/mL


(0-450)  H


 


Random Vancomycin Level 16.3 ug/mL  








Objective


HEAD AND NECK:  Shows no JVD.


LUNGS:  Coarse rhonchi.


CARDIOVASCULAR:  Regular S1 and S2 with no gallop or murmur.


ABDOMEN:  Soft.


EXTREMITIES:  No pitting edema.











MALCOLM ZULUAGA Jan 4, 2017 16:00

## 2017-01-04 NOTE — NEPHROLOGY PROGRESS NOTE
Assessment/Plan


Problem List:  


(1) Dyspnea


(2) Respiratory distress


(3) Congestive heart failure (CHF)


(4) HCAP (healthcare-associated pneumonia)


Plan


Cont abx per ID 


pulm hygiene. 


f/u recs per pulm and cardio.





Subjective


ROS Limited/Unobtainable:  Yes


Subjective


In bed, in no distress, confused





Objective


Objective





Last 24 Hour Vital Signs








  Date Time  Temp Pulse Resp B/P Pulse Ox O2 Delivery O2 Flow Rate FiO2


 


1/4/17 10:15      Room Air  88


 


1/4/17 09:55      Nasal Cannula 2.0 28


 


1/4/17 09:55  93 18   Nasal Cannula 2.0 28


 


1/4/17 09:55     93 Nasal Cannula 2.0 28


 


1/4/17 07:52 96.8 82 18 136/77 98 Nasal Cannula 2.0 


 


1/4/17 04:00 97.8 80 18 156/86 97 Nasal Cannula 2.0 


 


1/4/17 00:00 97.7 94 19 162/95 96 Nasal Cannula 2.0 


 


1/3/17 21:59    143/85    


 


1/3/17 19:44 97.9 93 16 143/85 97 Room Air  


 


1/3/17 19:31      Nasal Cannula 2.0 28


 


1/3/17 19:31     98 Nasal Cannula 2.0 28


 


1/3/17 19:29  91 18   Nasal Cannula 2.0 28


 


1/3/17 15:57 97.5 91 18 184/95 96 Room Air  


 


1/3/17 15:55    178/96    


 


1/3/17 11:39 97.5 82 20 153/77 95 Nasal Cannula 2.0 

















Intake and Output  


 


 1/3/17 1/4/17





 19:00 07:00


 


Intake Total 240 ml 840 ml


 


Output Total 300 ml 1300 ml


 


Balance -60 ml -460 ml


 


  


 


Intake Oral 240 ml 590 ml


 


IV Total  250 ml


 


Output Urine Total 300 ml 1300 ml








Laboratory Tests


1/4/17 04:00: 


White Blood Count 7.5, Red Blood Count 4.49, Hemoglobin 13.2, Hematocrit 41.7, 

Mean Corpuscular Volume 93, Mean Corpuscular Hemoglobin 29.3, Mean Corpuscular 

Hemoglobin Concent 31.6L, Red Cell Distribution Width 12.5, Platelet Count 165, 

Mean Platelet Volume 7.7, Neutrophils (%) (Auto) 52.7, Lymphocytes (%) (Auto) 

30.6, Monocytes (%) (Auto) 10.4H, Eosinophils (%) (Auto) 5.4H, Basophils (%) (

Auto) 0.9, Prothrombin Time 17.2H, Prothromb Time International Ratio 1.7H, 

Sodium Level 142, Potassium Level 3.3L, Chloride Level 100, Carbon Dioxide 

Level 32H, Anion Gap 10, Blood Urea Nitrogen 5L, Creatinine 0.5, Estimat 

Glomerular Filtration Rate , Glucose Level 104, Calcium Level 9.2, Pro-B-Type 

Natriuretic Peptide 1044H, Random Vancomycin Level 16.3


Height (Feet):  5


Height (Inches):  6.00


Weight (Pounds):  140


General Appearance:  no apparent distress


EENT:  normal ENT inspection, TMs normal


Neck:  non-tender, normal alignment


Cardiovascular:  normal rate, regular rhythm, no JVD


Abdomen:  normal bowel sounds, non tender, soft


Extremities:  non-tender, normal inspection, no calf tenderness


Neurologic:  disoriented











Alisia Oakes N.P. Jan 4, 2017 11:28

## 2017-01-04 NOTE — PULMONOLOGY PROGRESS NOTE
Assessment/Plan


ASSESSMENT


sepsis 


bacteremia ( CONS 4/4) 


r/o SBE, r/o septic thrombophlebitis


PNA 


probable UTI 


dementia 


Hx of CVA 


Hx of brain aneurysm 


HTN 


bilateral DVT LE, s/p IVC filter 


dysphagia  





PLAN OF CARE


Tight BP and BG control


Aspiration precautions


O2 HHN prn 


CXR 1/3 no acute disease 


abx, ID follows 


antitussive prn





Subjective


ROS Limited/Unobtainable:  Yes


Constitutional:  Reports: fatigue


Neurologic:  Reports: confusion, weakness


Allergies:  


Coded Allergies:  


     No Known Allergies (Unverified , 10/4/16)





Objective





Last 24 Hour Vital Signs








  Date Time  Temp Pulse Resp B/P Pulse Ox O2 Delivery O2 Flow Rate FiO2


 


1/4/17 16:00 97.7 81 20 148/76 99 Nasal Cannula 2.0 


 


1/4/17 11:59    128/71    


 


1/4/17 11:48 96.4 80 20 128/71 99 Nasal Cannula 2.0 


 


1/4/17 11:36    136/77    


 


1/4/17 10:15      Room Air  88


 


1/4/17 09:55      Nasal Cannula 2.0 28


 


1/4/17 09:55  93 18   Nasal Cannula 2.0 28


 


1/4/17 09:55     93 Nasal Cannula 2.0 28


 


1/4/17 07:52 96.8 82 18 136/77 98 Nasal Cannula 2.0 


 


1/4/17 04:00 97.8 80 18 156/86 97 Nasal Cannula 2.0 


 


1/4/17 00:00 97.7 94 19 162/95 96 Nasal Cannula 2.0 


 


1/3/17 21:59    143/85    


 


1/3/17 19:44 97.9 93 16 143/85 97 Room Air  


 


1/3/17 19:31      Nasal Cannula 2.0 28


 


1/3/17 19:31     98 Nasal Cannula 2.0 28


 


1/3/17 19:29  91 18   Nasal Cannula 2.0 28

















Intake and Output  


 


 1/3/17 1/4/17





 19:00 07:00


 


Intake Total 240 ml 840 ml


 


Output Total 300 ml 1300 ml


 


Balance -60 ml -460 ml


 


  


 


Intake Oral 240 ml 590 ml


 


IV Total  250 ml


 


Output Urine Total 300 ml 1300 ml








General Appearance:  no acute distress


HEENT:  normocephalic, atraumatic, PERRL


Respiratory/Chest:  chest wall non-tender, decreased breath sounds, accessory 

muscle use, rhonchi


Breasts:  no masses


Cardiovascular:  normal peripheral pulses, normal rate, regular rhythm, no JVD


Abdomen:  normal bowel sounds, soft, non tender, no organomegaly, non distended


Genitourinary:  normal external genitalia


Extremities:  no cyanosis


Skin:  rash, lesions


Neurologic/Psychiatric:  abnormal CN, motor weakness, sensory deficit, 

disoriented, unresponsiveness, aphasia, depressed affect


Laboratory Tests


1/4/17 04:00: 


White Blood Count 7.5, Red Blood Count 4.49, Hemoglobin 13.2, Hematocrit 41.7, 

Mean Corpuscular Volume 93, Mean Corpuscular Hemoglobin 29.3, Mean Corpuscular 

Hemoglobin Concent 31.6L, Red Cell Distribution Width 12.5, Platelet Count 165, 

Mean Platelet Volume 7.7, Neutrophils (%) (Auto) 52.7, Lymphocytes (%) (Auto) 

30.6, Monocytes (%) (Auto) 10.4H, Eosinophils (%) (Auto) 5.4H, Basophils (%) (

Auto) 0.9, Prothrombin Time 17.2H, Prothromb Time International Ratio 1.7H, 

Sodium Level 142, Potassium Level 3.3L, Chloride Level 100, Carbon Dioxide 

Level 32H, Anion Gap 10, Blood Urea Nitrogen 5L, Creatinine 0.5, Estimat 

Glomerular Filtration Rate , Glucose Level 104, Calcium Level 9.2, Pro-B-Type 

Natriuretic Peptide 1044H, Random Vancomycin Level 16.3





Current Medications








 Medications


  (Trade)  Dose


 Ordered  Sig/Toribio


 Route


 PRN Reason  Start Time


 Stop Time Status Last Admin


Dose Admin


 


 Acetaminophen


  (Tylenol)  650 mg  Q4H  PRN


 RECTAL


 Fever/Headache/Mild Pain  1/1/17 21:30


 1/31/17 21:29   


 


 


 Acetaminophen/


 Hydrocodone Bitart


  (Norco 10/325)  1 ea  Q6H  PRN


 ORAL


 Pain Scale (6-10)  1/1/17 19:45


 1/8/17 19:44  1/3/17 15:53


 


 


 Albuterol/


 Ipratropium


  (DuoNeb


 0.5-3(2.5)mg/3ml)  3 ml  Q4H  PRN


 HHN


 Shortness of Breath  1/1/17 21:45


 1/6/17 21:44   


 


 


 Azithromycin 500


 mg/Dextrose  250 ml @ 


 250 mls/hr  Q24HRS


 IV


   1/2/17 15:00


 1/4/17 18:00  1/4/17 15:01


 


 


 Ceftriaxone


 Sodium/Dextrose


  (Rocephin/D5W


 50ml)  50 ml @ 


 100 mls/hr  Q24H


 IVPB


   1/2/17 14:00


 1/4/17 18:00  1/4/17 13:42


 


 


 Clonidine HCl


  (Catapres)  0.1 mg  Q6H  PRN


 ORAL


 For High Blood Pressure  1/1/17 19:45


 1/31/17 19:44  1/3/17 15:55


 


 


 Dextrose


  (Dextrose 50%)    STAT  PRN


 IV


 Hypoglycemia  1/2/17 01:45


 2/1/17 01:44   


 


 


 Dextrose/


 Electrolytes


  (D5 0.45%NS W/


 KCl 20mEq)  1,000 ml @ 


 75 mls/hr  [id number]


 IV


   1/4/17 13:00


 2/3/17 12:59  1/4/17 14:12


 


 


 Diphenhydramine


 HCl


  (Benadryl)  25 mg  Q6H  PRN


 ORAL


 Itching/Pruritis  1/1/17 19:45


 1/31/17 19:44   


 


 


 Docusate Sodium


  (Colace)  100 mg  DAILY


 ORAL


   1/2/17 09:00


 2/1/17 08:59  1/4/17 11:36


 


 


 Enoxaparin Sodium


  (Lovenox)  60 mg  Q12HR@0600,1800


 SUBQ


   1/2/17 06:00


 2/1/17 05:59  1/4/17 17:41


 


 


 Guaifenesin


  (Robitussin)  100 mg  Q4H  PRN


 ORAL


 For Cough  1/1/17 19:00


 1/31/17 18:59  1/2/17 21:17


 


 


 Lorazepam


  (Ativan 2mg/ml


 1ml)  1 mg  Q4H  PRN


 IV


 For Anxiety  1/1/17 21:45


 1/8/17 21:44  1/4/17 01:09


 


 


 Lorazepam


  (Ativan)  1 mg  Q8H  PRN


 ORAL


 For Anxiety  1/2/17 01:45


 1/9/17 01:44   


 


 


 Losartan Potassium


  (Cozaar)  50 mg  Q12HR


 ORAL


   1/1/17 21:00


 1/31/17 20:59  1/4/17 11:36


 


 


 Nystatin 1 applic  1 applic  THREE TIMES A  DAY


 TOPIC


   1/4/17 13:00


 2/3/17 12:59  1/4/17 17:39


 


 


 Pantoprazole


  (Protonix)  40 mg  Q12H  PRN


 ORAL


 Abdominal cramps  1/2/17 01:45


 2/1/17 01:44   


 


 


 Vancomycin HCl


  (Vanco rx to


 dose)  1 ea  DAILY  PRN


 MISC


 Per rx protocol  1/2/17 09:00


 2/1/17 08:59   


 


 


 Vancomycin HCl/


 Dextrose


  (Vancomycin/D5W


 250ml)  250 ml @ 


 167 mls/hr  Q12HR@0500,1700


 IVPB


   1/3/17 05:00


 1/8/17 04:59  1/4/17 17:38


 


 


 Warfarin Sodium 1


 ea  1 ea  DAILY  PRN


 MISC


 Per rx protocol  1/2/17 09:00


 2/1/17 08:59   


 

















FER MOORE Jan 4, 2017 17:48

## 2017-01-04 NOTE — INFECTIOUS DISEASES PROG NOTE
Assessment/Plan


Assessment/Plan


ASSESSMENT: 86-year-old female with:





CONS bacteremia 4/4 r/o SBE, septic thrombophlebitis - repeat BCx NGTD, TTE 

pending


Pneumonia  SCx NRF


  Cxray 1/3 NAPD 


  Chest CT: Diffuse bilateral interstitial disease, possibly with micro-

nodularity, disseminated inflammatory lesions a possibility.


UTI , probable - UCx(-)


Fever - resolved, no leukocytosis








Acute BLE DVT SP IVC filter


CVA.


History of brain aneurysm.


Dementia.


NKDA


Full Code








PLAN:





Cont  IV Rocephin d # 7  /7  , azithro, IV Vanco d# 6 / 7 


f/u TTE


Monitor blood cultures (blood) 


Chest x-ray.


Monitor CBC and BMP





Subjective


Constitutional:  Denies: anorexia, chills, drenching sweats, fatigue, fever, no 

symptoms, other


Allergies:  


Coded Allergies:  


     No Known Allergies (Unverified , 10/4/16)





Objective


Vital Signs





Last 24 Hour Vital Signs








  Date Time  Temp Pulse Resp B/P Pulse Ox O2 Delivery O2 Flow Rate FiO2


 


1/4/17 07:52 96.8 82 18 136/77 98 Nasal Cannula 2.0 


 


1/4/17 04:00 97.8 80 18 156/86 97 Nasal Cannula 2.0 


 


1/4/17 00:00 97.7 94 19 162/95 96 Nasal Cannula 2.0 


 


1/3/17 21:59    143/85    


 


1/3/17 19:44 97.9 93 16 143/85 97 Room Air  


 


1/3/17 19:31      Nasal Cannula 2.0 28


 


1/3/17 19:31     98 Nasal Cannula 2.0 28


 


1/3/17 19:29  91 18   Nasal Cannula 2.0 28


 


1/3/17 15:57 97.5 91 18 184/95 96 Room Air  


 


1/3/17 15:55    178/96    


 


1/3/17 11:39 97.5 82 20 153/77 95 Nasal Cannula 2.0 








Height (Feet):  5


Height (Inches):  6.00


Weight (Pounds):  140


HEENT:  anicteric


Respiratory/Chest:  lungs clear


Cardiovascular:  normal peripheral pulses


Abdomen:  no organomegaly





Laboratory Tests








Test


  1/4/17


04:00


 


White Blood Count


  7.5 K/UL


(4.8-10.8)


 


Red Blood Count


  4.49 M/UL


(4.20-5.40)


 


Hemoglobin


  13.2 G/DL


(12.0-16.0)


 


Hematocrit


  41.7 %


(37.0-47.0)


 


Mean Corpuscular Volume 93 FL (80-99)  


 


Mean Corpuscular Hemoglobin


  29.3 PG


(27.0-31.0)


 


Mean Corpuscular Hemoglobin


Concent 31.6 G/DL


(32.0-36.0)  L


 


Red Cell Distribution Width


  12.5 %


(11.6-14.8)


 


Platelet Count


  165 K/UL


(150-450)


 


Mean Platelet Volume


  7.7 FL


(6.5-10.1)


 


Neutrophils (%) (Auto)


  52.7 %


(45.0-75.0)


 


Lymphocytes (%) (Auto)


  30.6 %


(20.0-45.0)


 


Monocytes (%) (Auto)


  10.4 %


(1.0-10.0)  H


 


Eosinophils (%) (Auto)


  5.4 %


(0.0-3.0)  H


 


Basophils (%) (Auto)


  0.9 %


(0.0-2.0)


 


Prothrombin Time


  17.2 SEC


(9.30-11.50)  H


 


Prothromb Time International


Ratio 1.7 (0.9-1.1)


H


 


Sodium Level


  142 mEQ/L


(135-145)


 


Potassium Level


  3.3 mEQ/L


(3.4-4.9)  L


 


Chloride Level


  100 mEQ/L


()


 


Carbon Dioxide Level


  32 mEQ/L


(20-30)  H


 


Anion Gap 10 (5-15)  


 


Blood Urea Nitrogen


  5 mg/dL (7-23)


L


 


Creatinine


  0.5 mg/dL


(0.5-0.9)


 


Estimat Glomerular Filtration


Rate  mL/min (>60)  


 


 


Glucose Level


  104 mg/dL


()


 


Calcium Level


  9.2 mg/dL


(8.6-10.2)


 


Pro-B-Type Natriuretic Peptide


  1044 pg/mL


(0-450)  H


 


Random Vancomycin Level 16.3 ug/mL  











Current Medications








 Medications


  (Trade)  Dose


 Ordered  Sig/Toribio


 Route


 PRN Reason  Start Time


 Stop Time Status Last Admin


Dose Admin


 


 Acetaminophen


  (Tylenol)  650 mg  Q4H  PRN


 RECTAL


 Fever/Headache/Mild Pain  1/1/17 21:30


 1/31/17 21:29   


 


 


 Acetaminophen/


 Hydrocodone Bitart


  (Norco 10/325)  1 ea  Q6H  PRN


 ORAL


 Pain Scale (6-10)  1/1/17 19:45


 1/8/17 19:44  1/3/17 15:53


 


 


 Albuterol/


 Ipratropium


  (DuoNeb


 0.5-3(2.5)mg/3ml)  3 ml  Q4H  PRN


 HHN


 Shortness of Breath  1/1/17 21:45


 1/6/17 21:44   


 


 


 Azithromycin 500


 mg/Dextrose  250 ml @ 


 250 mls/hr  Q24HRS


 IV


   1/2/17 15:00


 1/5/17 15:01  1/3/17 14:42


 


 


 Ceftriaxone


 Sodium/Dextrose


  (Rocephin/D5W


 50ml)  50 ml @ 


 100 mls/hr  Q24H


 IVPB


   1/2/17 14:00


 1/9/17 13:59  1/3/17 13:54


 


 


 Clonidine HCl


  (Catapres)  0.1 mg  Q6H  PRN


 ORAL


 For High Blood Pressure  1/1/17 19:45


 1/31/17 19:44  1/3/17 15:55


 


 


 Dextrose


  (Dextrose 50%)    STAT  PRN


 IV


 Hypoglycemia  1/2/17 01:45


 2/1/17 01:44   


 


 


 Diphenhydramine


 HCl


  (Benadryl)  25 mg  Q6H  PRN


 ORAL


 Itching/Pruritis  1/1/17 19:45


 1/31/17 19:44   


 


 


 Docusate Sodium


  (Colace)  100 mg  DAILY


 ORAL


   1/2/17 09:00


 2/1/17 08:59  1/2/17 10:27


 


 


 Enoxaparin Sodium


  (Lovenox)  60 mg  Q12HR@0600,1800


 SUBQ


   1/2/17 06:00


 2/1/17 05:59  1/4/17 05:36


 


 


 Guaifenesin


  (Robitussin)  100 mg  Q4H  PRN


 ORAL


 For Cough  1/1/17 19:00


 1/31/17 18:59  1/2/17 21:17


 


 


 Lorazepam


  (Ativan 2mg/ml


 1ml)  1 mg  Q4H  PRN


 IV


 For Anxiety  1/1/17 21:45


 1/8/17 21:44  1/4/17 01:09


 


 


 Lorazepam


  (Ativan)  1 mg  Q8H  PRN


 ORAL


 For Anxiety  1/2/17 01:45


 1/9/17 01:44   


 


 


 Losartan Potassium


  (Cozaar)  50 mg  Q12HR


 ORAL


   1/1/17 21:00


 1/31/17 20:59  1/3/17 21:59


 


 


 Pantoprazole


  (Protonix)  40 mg  Q12H  PRN


 ORAL


 Abdominal cramps  1/2/17 01:45


 2/1/17 01:44   


 


 


 Vancomycin HCl


  (Vanco rx to


 dose)  1 ea  DAILY  PRN


 MISC


 Per rx protocol  1/2/17 09:00


 2/1/17 08:59   


 


 


 Vancomycin HCl/


 Dextrose


  (Vancomycin/D5W


 250ml)  250 ml @ 


 167 mls/hr  Q12HR@0500,1700


 IVPB


   1/3/17 05:00


 1/8/17 04:59  1/4/17 05:02


 


 


 Warfarin Sodium 1


 ea  1 ea  DAILY  PRN


 MISC


 Per rx protocol  1/2/17 09:00


 2/1/17 08:59   


 

















BRIANDA PAYNE M.D. Jan 4, 2017 09:02

## 2017-01-04 NOTE — GENERAL PROGRESS NOTE
Assessment/Plan


Assessment/Plan


ASSESSMENT:


#. DVT of the bilateral lower extremities - s/p ivc permanent IVC filter and on 

coumadin, goal INR 2-3


#. Anemia potentially secondary to anemia of chronic disease - improved


#. Leukocytosis. Improved


#. Coagulopathy 2/2 coumadin


#. Trochanteric fracture of the right femur status post open reduction and 

internal fixation and repair


#. Sepsis.


#. PNA





RECOMMENDATIONS:


1. Continue coumadin


2. Continue bridge ppx lovenox


3. Goal INR 2-3 


4. Continue antibiotics as needed


5. Transfuse if hemoglobin < 7.5


6. Continue nutritional support.


7. Followup pulm, cards recs


8.  Staff





Thank you,





Daniel Sierra MD





Subjective


Constitutional:  Reports: no symptoms


HEENT:  Reports: no symptoms


Cardiovascular:  Reports: no symptoms


Respiratory:  Reports: no symptoms


Gastrointestinal/Abdominal:  Reports: poor appetite


Genitourinary:  Reports: no symptoms


Neurologic/Psychiatric:  Reports: no symptoms


Endocrine:  Reports: no symptoms


Hematologic/Lymphatic:  Reports: anemia


Allergies:  


Coded Allergies:  


     No Known Allergies (Unverified , 10/4/16)


Subjective


stable, not bleeding, tolerating coumadin





Objective





Last 24 Hour Vital Signs








  Date Time  Temp Pulse Resp B/P Pulse Ox O2 Delivery O2 Flow Rate FiO2


 


1/4/17 04:00 97.8 80 18 156/86 97 Nasal Cannula 2.0 


 


1/4/17 00:00 97.7 94 19 162/95 96 Nasal Cannula 2.0 


 


1/3/17 21:59    143/85    


 


1/3/17 19:44 97.9 93 16 143/85 97 Room Air  


 


1/3/17 19:31      Nasal Cannula 2.0 28


 


1/3/17 19:31     98 Nasal Cannula 2.0 28


 


1/3/17 19:29  91 18   Nasal Cannula 2.0 28


 


1/3/17 15:57 97.5 91 18 184/95 96 Room Air  


 


1/3/17 15:55    178/96    


 


1/3/17 11:39 97.5 82 20 153/77 95 Nasal Cannula 2.0 


 


1/3/17 08:16 97.3 99 21 154/93 95 Nasal Cannula 2.0 


 


1/3/17 08:14  99 18   Nasal Cannula 2.0 28


 


1/3/17 08:14     95 Nasal Cannula 2.0 28


 


1/3/17 08:14      Nasal Cannula 2.0 28

















Intake and Output  


 


 1/3/17 1/4/17





 19:00 07:00


 


Intake Total 240 ml 380 ml


 


Output Total 300 ml 550 ml


 


Balance -60 ml -170 ml


 


  


 


Intake Oral 240 ml 380 ml


 


Output Urine Total 300 ml 550 ml








Laboratory Tests


1/3/17 06:50: 


Prothrombin Time 12.4H, Prothromb Time International Ratio 1.2H


1/4/17 04:00: 


Prothrombin Time 17.2H, Prothromb Time International Ratio 1.7H, White Blood 

Count 7.5, Red Blood Count 4.49, Hemoglobin 13.2, Hematocrit 41.7, Mean 

Corpuscular Volume 93, Mean Corpuscular Hemoglobin 29.3, Mean Corpuscular 

Hemoglobin Concent 31.6L, Red Cell Distribution Width 12.5, Platelet Count 165, 

Mean Platelet Volume 7.7, Neutrophils (%) (Auto) 52.7, Lymphocytes (%) (Auto) 

30.6, Monocytes (%) (Auto) 10.4H, Eosinophils (%) (Auto) 5.4H, Basophils (%) (

Auto) 0.9, Sodium Level 142, Potassium Level 3.3L, Chloride Level 100, Carbon 

Dioxide Level 32H, Anion Gap 10, Blood Urea Nitrogen 5L, Creatinine 0.5, 

Estimat Glomerular Filtration Rate , Glucose Level 104, Calcium Level 9.2, Pro-B

-Type Natriuretic Peptide 1044H, Random Vancomycin Level 16.3


Height (Feet):  5


Height (Inches):  6.00


Weight (Pounds):  140


General Appearance:  no apparent distress


EENT:  TMs normal


Neck:  supple


Cardiovascular:  regular rhythm


Respiratory/Chest:  lungs clear


Abdomen:  non tender


Extremities:  non-tender


Edema:  1+ Leg (L), 1+ Leg (R)


Edema:  mild edema


Neurologic:  alert


Skin:  warm/dry











Daniel Sierra Jan 4, 2017 05:42

## 2017-01-05 NOTE — INFECTIOUS DISEASES PROG NOTE
Assessment/Plan


Assessment/Plan


ASSESSMENT: 86-year-old female with:





CONS bacteremia 4/4 r/o SBE, septic thrombophlebitis - repeat BCx NGTD, TTE 

pending


Pneumonia  SCx NRF


  Cxray 1/3 [facility] 


  Chest CT: Diffuse bilateral interstitial disease, possibly with micro-

nodularity, disseminated inflammatory lesions a possibility.


UTI , probable - UCx(-)


Fever - resolved, no leukocytosis








Acute BLE DVT SP IVC filter


CVA.


History of brain aneurysm.


Dementia.


NKDA


Full Code








PLAN:





( SP  IV Rocephin  azithro, IV Vanco d# 7 / 7 ) 


f/u TTE


Monitor blood cultures (blood) 


Chest x-ray.


Monitor CBC and BMP





Subjective


Constitutional:  Denies: anorexia, chills, drenching sweats, fatigue, fever, no 

symptoms, other


Allergies:  


Coded Allergies:  


     No Known Allergies (Unverified , 10/4/16)





Objective


Vital Signs





Last 24 Hour Vital Signs








  Date Time  Temp Pulse Resp B/P Pulse Ox O2 Delivery O2 Flow Rate FiO2


 


1/5/17 19:29     95 Nasal Cannula 2.0 28


 


1/5/17 19:29      Nasal Cannula 2.0 28


 


1/5/17 19:28  81 18   Nasal Cannula 2.0 28


 


1/5/17 16:00 97.7 87 20 153/85 96 Nasal Cannula 2.0 


 


1/5/17 11:32 97.5 87 21 115/86 95 Nasal Cannula 2.0 


 


1/5/17 10:10      Nasal Cannula 2.0 28


 


1/5/17 10:10  80 18   Nasal Cannula 2.0 28


 


1/5/17 10:10     96 Nasal Cannula 2.0 28


 


1/5/17 08:40    144/84    


 


1/5/17 08:00 98.1 79 21 144/84 96 Room Air  


 


1/5/17 04:00 98.1 92 19 121/98 97 Nasal Cannula 2.0 


 


1/5/17 00:00 97.7 85 19 131/88 97 Nasal Cannula 2.0 


 


1/4/17 20:33     97 Nasal Cannula 2.0 28


 


1/4/17 20:33      Nasal Cannula 2.0 28


 


1/4/17 20:32  80 18   Nasal Cannula 2.0 28


 


1/4/17 20:00 98.2 70 20 133/60 94 Nasal Cannula 2.0 








Height (Feet):  5


Height (Inches):  6.00


Weight (Pounds):  140


HEENT:  atraumatic


Respiratory/Chest:  normal breath sounds


Cardiovascular:  normal rate


Abdomen:  soft, non tender





Laboratory Tests








Test


  1/5/17


10:30


 


Prothrombin Time


  39.8 SEC


(9.30-11.50)  H


 


Prothromb Time International


Ratio 3.8 (0.9-1.1)


H











Current Medications








 Medications


  (Trade)  Dose


 Ordered  Sig/Toribio


 Route


 PRN Reason  Start Time


 Stop Time Status Last Admin


Dose Admin


 


 Acetaminophen


  (Tylenol)  650 mg  Q4H  PRN


 RECTAL


 Fever/Headache/Mild Pain  1/1/17 21:30


 1/31/17 21:29   


 


 


 Acetaminophen/


 Hydrocodone Bitart


  (Norco 10/325)  1 ea  Q6H  PRN


 ORAL


 Pain Scale (6-10)  1/1/17 19:45


 1/8/17 19:44  1/3/17 15:53


 


 


 Albuterol/


 Ipratropium


  (DuoNeb


 0.5-3(2.5)mg/3ml)  3 ml  Q4H  PRN


 HHN


 Shortness of Breath  1/1/17 21:45


 1/6/17 21:44   


 


 


 Clonidine HCl


  (Catapres)  0.1 mg  Q6H  PRN


 ORAL


 For High Blood Pressure  1/1/17 19:45


 1/31/17 19:44  1/3/17 15:55


 


 


 Dextrose


  (Dextrose 50%)    STAT  PRN


 IV


 Hypoglycemia  1/2/17 01:45


 2/1/17 01:44   


 


 


 Diphenhydramine


 HCl


  (Benadryl)  25 mg  Q6H  PRN


 ORAL


 Itching/Pruritis  1/1/17 19:45


 1/31/17 19:44   


 


 


 Docusate Sodium


  (Colace)  100 mg  DAILY


 ORAL


   1/2/17 09:00


 2/1/17 08:59  1/4/17 11:36


 


 


 Enoxaparin Sodium


  (Lovenox)  60 mg  Q12HR@0600,1800


 SUBQ


   1/2/17 06:00


 2/1/17 05:59  1/5/17 17:50


 


 


 Guaifenesin


  (Robitussin)  100 mg  Q4H  PRN


 ORAL


 For Cough  1/1/17 19:00


 1/31/17 18:59  1/2/17 21:17


 


 


 Lorazepam


  (Ativan 2mg/ml


 1ml)  1 mg  Q4H  PRN


 IV


 For Anxiety  1/1/17 21:45


 1/8/17 21:44  1/4/17 17:55


 


 


 Losartan Potassium


  (Cozaar)  50 mg  Q12HR


 ORAL


   1/1/17 21:00


 1/31/17 20:59  1/5/17 08:40


 


 


 Nystatin


  (Nystop Powder)  1 applic  THREE TIMES A  DAY


 TOPIC


   1/4/17 13:00


 2/3/17 12:59  1/5/17 17:40


 


 


 Pantoprazole


  (Protonix)  40 mg  Q12H  PRN


 ORAL


 Abdominal cramps  1/2/17 01:45


 2/1/17 01:44   


 


 


 Vancomycin HCl


  (Vanco rx to


 dose)  1 ea  DAILY  PRN


 MISC


 Per rx protocol  1/2/17 09:00


 2/1/17 08:59   


 


 


 Vancomycin HCl/


 Dextrose


  (Vancomycin/D5W


 250ml)  250 ml @ 


 167 mls/hr  Q12HR@0500,1700


 IVPB


   1/3/17 05:00


 1/8/17 04:59  1/5/17 17:40


 


 


 Warfarin Sodium 1


 ea  1 ea  DAILY  PRN


 MISC


 Per rx protocol  1/2/17 09:00


 2/1/17 08:59   


 

















BRIANDA PAYNE M.D. Jan 5, 2017 19:41

## 2017-01-05 NOTE — INFECTIOUS DISEASES PROG NOTE
Assessment/Plan


Assessment/Plan


ASSESSMENT: 86-year-old female with:





CONS bacteremia 4/4 r/o SBE, septic thrombophlebitis - repeat BCx NGTD, TTE 

pending


Pneumonia  SCx NRF


  Cxray 1/3 [facility] 


  Chest CT: Diffuse bilateral interstitial disease, possibly with micro-

nodularity, disseminated inflammatory lesions a possibility.


UTI , probable - UCx(-)


Fever - resolved, no leukocytosis








Acute BLE DVT SP IVC filter


CVA.


History of brain aneurysm.


Dementia.


NKDA


Full Code








PLAN:





Cont  IV Rocephin d # 7  /7  , azithro, IV Vanco d# 6 / 7 


f/u TTE


Monitor blood cultures (blood) 


Chest x-ray.


Monitor CBC and BMP





Subjective


Allergies:  


Coded Allergies:  


     No Known Allergies (Unverified , 10/4/16)





Objective


Vital Signs





Last 24 Hour Vital Signs








  Date Time  Temp Pulse Resp B/P Pulse Ox O2 Delivery O2 Flow Rate FiO2


 


1/5/17 16:00 97.7 87 20 153/85 96 Nasal Cannula 2.0 


 


1/5/17 11:32 97.5 87 21 115/86 95 Nasal Cannula 2.0 


 


1/5/17 10:10      Nasal Cannula 2.0 28


 


1/5/17 10:10  80 18   Nasal Cannula 2.0 28


 


1/5/17 10:10     96 Nasal Cannula 2.0 28


 


1/5/17 08:40    144/84    


 


1/5/17 08:00 98.1 79 21 144/84 96 Room Air  


 


1/5/17 04:00 98.1 92 19 121/98 97 Nasal Cannula 2.0 


 


1/5/17 00:00 97.7 85 19 131/88 97 Nasal Cannula 2.0 


 


1/4/17 20:33     97 Nasal Cannula 2.0 28


 


1/4/17 20:33      Nasal Cannula 2.0 28


 


1/4/17 20:32  80 18   Nasal Cannula 2.0 28


 


1/4/17 20:00 98.2 70 20 133/60 94 Nasal Cannula 2.0 








Height (Feet):  5


Height (Inches):  6.00


Weight (Pounds):  140





Laboratory Tests








Test


  1/5/17


10:30


 


Prothrombin Time


  39.8 SEC


(9.30-11.50)  H


 


Prothromb Time International


Ratio 3.8 (0.9-1.1)


H











Current Medications








 Medications


  (Trade)  Dose


 Ordered  Sig/Toribio


 Route


 PRN Reason  Start Time


 Stop Time Status Last Admin


Dose Admin


 


 Acetaminophen


  (Tylenol)  650 mg  Q4H  PRN


 RECTAL


 Fever/Headache/Mild Pain  1/1/17 21:30


 1/31/17 21:29   


 


 


 Acetaminophen/


 Hydrocodone Bitart


  (Norco 10/325)  1 ea  Q6H  PRN


 ORAL


 Pain Scale (6-10)  1/1/17 19:45


 1/8/17 19:44  1/3/17 15:53


 


 


 Albuterol/


 Ipratropium


  (DuoNeb


 0.5-3(2.5)mg/3ml)  3 ml  Q4H  PRN


 HHN


 Shortness of Breath  1/1/17 21:45


 1/6/17 21:44   


 


 


 Clonidine HCl


  (Catapres)  0.1 mg  Q6H  PRN


 ORAL


 For High Blood Pressure  1/1/17 19:45


 1/31/17 19:44  1/3/17 15:55


 


 


 Dextrose


  (Dextrose 50%)    STAT  PRN


 IV


 Hypoglycemia  1/2/17 01:45


 2/1/17 01:44   


 


 


 Diphenhydramine


 HCl


  (Benadryl)  25 mg  Q6H  PRN


 ORAL


 Itching/Pruritis  1/1/17 19:45


 1/31/17 19:44   


 


 


 Docusate Sodium


  (Colace)  100 mg  DAILY


 ORAL


   1/2/17 09:00


 2/1/17 08:59  1/4/17 11:36


 


 


 Enoxaparin Sodium


  (Lovenox)  60 mg  Q12HR@0600,1800


 SUBQ


   1/2/17 06:00


 2/1/17 05:59  1/5/17 05:51


 


 


 Guaifenesin


  (Robitussin)  100 mg  Q4H  PRN


 ORAL


 For Cough  1/1/17 19:00


 1/31/17 18:59  1/2/17 21:17


 


 


 Lorazepam


  (Ativan 2mg/ml


 1ml)  1 mg  Q4H  PRN


 IV


 For Anxiety  1/1/17 21:45


 1/8/17 21:44  1/4/17 17:55


 


 


 Lorazepam


  (Ativan)  1 mg  Q8H  PRN


 ORAL


 For Anxiety  1/2/17 01:45


 1/9/17 01:44   


 


 


 Losartan Potassium


  (Cozaar)  50 mg  Q12HR


 ORAL


   1/1/17 21:00


 1/31/17 20:59  1/5/17 08:40


 


 


 Nystatin


  (Nystop Powder)  1 applic  THREE TIMES A  DAY


 TOPIC


   1/4/17 13:00


 2/3/17 12:59  1/5/17 13:27


 


 


 Pantoprazole


  (Protonix)  40 mg  Q12H  PRN


 ORAL


 Abdominal cramps  1/2/17 01:45


 2/1/17 01:44   


 


 


 Vancomycin HCl


  (Vanco rx to


 dose)  1 ea  DAILY  PRN


 MISC


 Per rx protocol  1/2/17 09:00


 2/1/17 08:59   


 


 


 Vancomycin HCl/


 Dextrose


  (Vancomycin/D5W


 250ml)  250 ml @ 


 167 mls/hr  Q12HR@0500,1700


 IVPB


   1/3/17 05:00


 1/8/17 04:59  1/5/17 05:30


 


 


 Warfarin Sodium 1


 ea  1 ea  DAILY  PRN


 MISC


 Per rx protocol  1/2/17 09:00


 2/1/17 08:59   


 

















BRIANDA PAYNE M.D. Jan 5, 2017 17:14

## 2017-01-05 NOTE — CARDIAC ELECTROPHYSIOLOGY PN
Assessment/Plan


Assessment/Plan


1. Shortness of breath and bilateral LE DVT. Chest CT angiogram showed no 

pulmonary embolism.  Echo pending. 


2. Hypertension. On Cozaar 50 mg b.i.d.  


3. Pneumonia on ceftriaxone and albuterol.


4. Bilateral LE DVT on Coumadin. S/P IVC filter.





ROMERO RN





Subjective


Subjective


Comfortable in NAD.Sitter at bedside. No new events.No chest pain or SOB





Objective





Last 24 Hour Vital Signs








  Date Time  Temp Pulse Resp B/P Pulse Ox O2 Delivery O2 Flow Rate FiO2


 


1/5/17 16:00 97.7 87 20 153/85 96 Nasal Cannula 2.0 


 


1/5/17 11:32 97.5 87 21 115/86 95 Nasal Cannula 2.0 


 


1/5/17 10:10      Nasal Cannula 2.0 28


 


1/5/17 10:10  80 18   Nasal Cannula 2.0 28


 


1/5/17 10:10     96 Nasal Cannula 2.0 28


 


1/5/17 08:40    144/84    


 


1/5/17 08:00 98.1 79 21 144/84 96 Room Air  


 


1/5/17 04:00 98.1 92 19 121/98 97 Nasal Cannula 2.0 


 


1/5/17 00:00 97.7 85 19 131/88 97 Nasal Cannula 2.0 


 


1/4/17 20:33     97 Nasal Cannula 2.0 28


 


1/4/17 20:33      Nasal Cannula 2.0 28


 


1/4/17 20:32  80 18   Nasal Cannula 2.0 28


 


1/4/17 20:00 98.2 70 20 133/60 94 Nasal Cannula 2.0 

















Intake and Output  


 


 1/4/17 1/5/17





 19:00 07:00


 


Intake Total 909.5 ml 1073 ml


 


Output Total 400 ml 625 ml


 


Balance 509.5 ml 448 ml


 


  


 


Intake Oral 300 ml 240 ml


 


IV Total 609.5 ml 833 ml


 


Output Urine Total 400 ml 625 ml


 


# Voids 2 


 


# Bowel Movements 1 1











Laboratory Tests








Test


  1/5/17


10:30


 


Prothrombin Time


  39.8 SEC


(9.30-11.50)  H


 


Prothromb Time International


Ratio 3.8 (0.9-1.1)


H








Objective


HEAD AND NECK:  Shows no JVD.


LUNGS:  Coarse rhonchi.


CARDIOVASCULAR:  Regular S1 and S2 with no gallop or murmur.


ABDOMEN:  Soft.


EXTREMITIES:  No pitting edema.











TOLUIE,MALCOLM Jan 5, 2017 18:17

## 2017-01-05 NOTE — NEPHROLOGY PROGRESS NOTE
Assessment/Plan


Problem List:  


(1) Hypokalemia


Assessment:  corrected.





(2) DVT (deep venous thrombosis)


(3) HTN (hypertension)


(4) Alzheimer's dementia


(5) Dysphagia


(6) Depression


(7) CVA (cerebral vascular accident)


(8) Brain aneurysm


(9) HCAP (healthcare-associated pneumonia)


(10) Dyspnea


(11) Congestive heart failure (CHF)


(12) Gram positive bacterial infection


Plan


abx per ID. 


pulm hygiene. 


f/u recs per pulm and cardio. 


d/c planning - home with hh for am 1/6.





Subjective


Subjective


appears comfortable. no acute events.





Objective


Objective





Last 24 Hour Vital Signs








  Date Time  Temp Pulse Resp B/P Pulse Ox O2 Delivery O2 Flow Rate FiO2


 


1/5/17 11:32 97.5 87 21 115/86 95 Nasal Cannula 2.0 


 


1/5/17 10:10      Nasal Cannula 2.0 28


 


1/5/17 10:10  80 18   Nasal Cannula 2.0 28


 


1/5/17 10:10     96 Nasal Cannula 2.0 28


 


1/5/17 08:40    144/84    


 


1/5/17 08:00 98.1 79 21 144/84 96 Room Air  


 


1/5/17 04:00 98.1 92 19 121/98 97 Nasal Cannula 2.0 


 


1/5/17 00:00 97.7 85 19 131/88 97 Nasal Cannula 2.0 


 


1/4/17 20:33     97 Nasal Cannula 2.0 28


 


1/4/17 20:33      Nasal Cannula 2.0 28


 


1/4/17 20:32  80 18   Nasal Cannula 2.0 28


 


1/4/17 20:00 98.2 70 20 133/60 94 Nasal Cannula 2.0 


 


1/4/17 16:00 97.7 81 20 148/76 99 Nasal Cannula 2.0 

















Intake and Output  


 


 1/4/17 1/5/17





 19:00 07:00


 


Intake Total 909.5 ml 1073 ml


 


Output Total 400 ml 625 ml


 


Balance 509.5 ml 448 ml


 


  


 


Intake Oral 300 ml 240 ml


 


IV Total 609.5 ml 833 ml


 


Output Urine Total 400 ml 625 ml


 


# Voids 2 


 


# Bowel Movements 1 1








Laboratory Tests


1/5/17 10:30: 


Prothrombin Time 39.8H, Prothromb Time International Ratio 3.8H


Height (Feet):  5


Height (Inches):  6.00


Weight (Pounds):  140


General Appearance:  no apparent distress


Cardiovascular:  normal rate, regular rhythm


Respiratory/Chest:  decreased breath sounds


Abdomen:  non tender, soft


Extremities:  trace edema


Neurologic:  alert











KIMBERLY JERNIGAN Jan 5, 2017 12:08

## 2017-01-05 NOTE — GENERAL PROGRESS NOTE
Assessment/Plan


Assessment/Plan


ASSESSMENT:


#. DVT of the bilateral lower extremities - s/p ivc permanent IVC filter and on 

coumadin, goal INR 2-3


#. Anemia potentially secondary to anemia of chronic disease - has improved


#. Leukocytosis. Improved


#. Coagulopathy 2/2 coumadin


#. Trochanteric fracture of the right femur status post open reduction and 

internal fixation and repair


#. Sepsis


#. PNA





RECOMMENDATIONS:


1. Continue coumadin


2. Continue bridge ppx lovenox


3. Goal INR 2-3 


4. Continue antibiotics as needed


5. Transfuse if hemoglobin < 7.5


6. Continue nutritional support.


7. Followup pulm, cards, renal recs


8.  Staff





Thank you,





Daniel Sierra MD





Subjective


Constitutional:  Reports: no symptoms


HEENT:  Reports: no symptoms


Cardiovascular:  Reports: no symptoms


Respiratory:  Reports: no symptoms


Gastrointestinal/Abdominal:  Reports: poor appetite


Genitourinary:  Reports: no symptoms


Neurologic/Psychiatric:  Reports: no symptoms


Endocrine:  Reports: no symptoms


Hematologic/Lymphatic:  Reports: anemia


Allergies:  


Coded Allergies:  


     No Known Allergies (Unverified , 10/4/16)


Subjective


stable, is not bleeding, tolerating coumadin/lovenox





Objective





Last 24 Hour Vital Signs








  Date Time  Temp Pulse Resp B/P Pulse Ox O2 Delivery O2 Flow Rate FiO2


 


1/5/17 11:32 97.5 87 21 115/86 95 Nasal Cannula 2.0 


 


1/5/17 10:10      Nasal Cannula 2.0 28


 


1/5/17 10:10  80 18   Nasal Cannula 2.0 28


 


1/5/17 10:10     96 Nasal Cannula 2.0 28


 


1/5/17 08:40    144/84    


 


1/5/17 08:00 98.1 79 21 144/84 96 Room Air  


 


1/5/17 04:00 98.1 92 19 121/98 97 Nasal Cannula 2.0 


 


1/5/17 00:00 97.7 85 19 131/88 97 Nasal Cannula 2.0 


 


1/4/17 20:33     97 Nasal Cannula 2.0 28


 


1/4/17 20:33      Nasal Cannula 2.0 28


 


1/4/17 20:32  80 18   Nasal Cannula 2.0 28


 


1/4/17 20:00 98.2 70 20 133/60 94 Nasal Cannula 2.0 

















Intake and Output  


 


 1/4/17 1/5/17





 19:00 07:00


 


Intake Total 909.5 ml 1073 ml


 


Output Total 400 ml 625 ml


 


Balance 509.5 ml 448 ml


 


  


 


Intake Oral 300 ml 240 ml


 


IV Total 609.5 ml 833 ml


 


Output Urine Total 400 ml 625 ml


 


# Voids 2 


 


# Bowel Movements 1 1








Laboratory Tests


1/5/17 10:30: 


Prothrombin Time 39.8H, Prothromb Time International Ratio 3.8H


Height (Feet):  5


Height (Inches):  6.00


Weight (Pounds):  140


General Appearance:  no apparent distress


EENT:  TMs normal


Neck:  normal alignment


Cardiovascular:  normal rate


Respiratory/Chest:  normal breath sounds


Abdomen:  non tender


Extremities:  non-tender


Edema:  1+ Leg (L), 1+ Leg (R)


Edema:  mild edema


Neurologic:  alert


Skin:  warm/dry











Daniel Sierra Jan 5, 2017 16:49

## 2017-01-06 NOTE — CARDIAC ELECTROPHYSIOLOGY PN
Assessment/Plan


Status Narrative


Echo 10/13/2016


M-mode measurements of left ventricle not obtainable due to cardiac position 


(angle)


Normal left ventricular chamber size, systolic function and wall motion to 

extent 


visualized.


Left ventricular ejection fraction estimated to be  65 %.


Moderate left ventricular hypertrophy by 2-D.


No evidence of pericardial effusion. 


All other cardiac chamber sizes are within normal limits. 


Focal aortic valve sclerosis with adequate cusp excursion.


Thickened mitral valve leaflets with normal excursion.


Mitral annulus and aortic root calcification.


Pulmonic valve not well visualized.


Normal tricuspid valve structure. 


IVC at normal size with physiologic collapse.





A  color flow and spectral Doppler study was performed and revealed:


Mild aortic regurgitation.


Mild mitral regurgitation.


Mitral diastolic velocities suggest reduced left ventricular relaxation c/w 

mild LV 


diastolic dysfunction (Grade I). 


Trace to mild tricuspid regurgitation.


Tricuspid  systolic velocities suggests peak right ventricular systolic 

pressure of  31  


mmHg.


Assessment/Plan


1. Shortness of breath and bilateral LE DVT. Chest CT angiogram , no pulmonary 

embolism.  Echo 10/2016 EF 65%


2. Hypertension. On Cozaar 50 mg b.i.d.  


3. Pneumonia on ceftriaxone and albuterol.


4. Bilateral LE DVT on Coumadin. S/P IVC filter.


5. Dysphagia.





ROMERO RN





Subjective


Subjective


Confused and at times agitated this am. Was aspirating/coughing with her 

breakfast per sitter.No new events.No chest pain or SOB





Objective





Last 24 Hour Vital Signs








  Date Time  Temp Pulse Resp B/P Pulse Ox O2 Delivery O2 Flow Rate FiO2


 


1/6/17 08:38 98.4 95 15 149/104 92 Nasal Cannula  


 


1/6/17 04:29    167/96    


 


1/6/17 04:00 97.9 88 18 167/96 94 Nasal Cannula 2.0 


 


1/6/17 00:00 97.0 87 18 156/78 98 Room Air  


 


1/5/17 21:50    168/86    


 


1/5/17 21:50    168/86    


 


1/5/17 19:29     95 Nasal Cannula 2.0 28


 


1/5/17 19:29      Nasal Cannula 2.0 28


 


1/5/17 19:28  81 18   Nasal Cannula 2.0 28


 


1/5/17 19:00 97.5 82 20 168/86 93 Nasal Cannula 2.0 


 


1/5/17 16:00 97.7 87 20 153/85 96 Nasal Cannula 2.0 


 


1/5/17 11:32 97.5 87 21 115/86 95 Nasal Cannula 2.0 


 


1/5/17 10:10      Nasal Cannula 2.0 28


 


1/5/17 10:10  80 18   Nasal Cannula 2.0 28


 


1/5/17 10:10     96 Nasal Cannula 2.0 28

















Intake and Output  


 


 1/5/17 1/6/17





 19:00 07:00


 


Intake Total 782 ml 323 ml


 


Output Total 600 ml 1700 ml


 


Balance 182 ml -1377 ml


 


  


 


Intake Oral 240 ml 240 ml


 


IV Total 542 ml 83 ml


 


Output Urine Total 600 ml 1700 ml


 


# Bowel Movements 1 











Laboratory Tests








Test


  1/5/17


10:30


 


Prothrombin Time


  39.8 SEC


(9.30-11.50)  H


 


Prothromb Time International


Ratio 3.8 (0.9-1.1)


H








Objective


HEAD AND NECK:  Shows no JVD.


LUNGS:  Coarse rhonchi.


CARDIOVASCULAR:  Regular S1 and S2 with no gallop or murmur.


ABDOMEN:  Soft.


EXTREMITIES:  No pitting edema.











MALCOLM ZULUAGA Jan 6, 2017 09:35

## 2017-01-06 NOTE — PULMONOLOGY PROGRESS NOTE
Assessment/Plan


Assessment/Plan


ASSESSMENT


sepsis 


bacteremia ( CONS 4/4) 


r/o SBE, r/o septic thrombophlebitis


PNA 


probable UTI 


dementia 


Hx of CVA 


Hx of brain aneurysm 


HTN 


bilateral DVT LE, s/p IVC filter 


dysphagia  





PLAN OF CARE


MS floor 


O2 HHN prn 


CXR 1/3 no acute disease 


abx, ID follows 


antitussive prn 


last set of blood cx negative preliminary  ( prior 4/4 + CONS)


ECHO with preserved EF 65 and RVSP of 31, mild LVH, no evidence of vegetation


cardio follows


BP management with ARB, stable   


Venous Duplex + bilateral LE DVT, s/p IVC filter 


CTA no PE 


on Coumadin per pharmacy ,was on Lovenox as well till yesterday INR-3.8 on 1/5, 

off Lovenox, 


keep INR in therapeutic range 


GI prophylaxis 


aspiration precautions 


diet as per ST recommendation   





case discussed and evaluated by supervising physician





Subjective


Allergies:  


Coded Allergies:  


     No Known Allergies (Unverified , 10/4/16)


Subjective


afebrile, no leucocytosis, 


no signs of respirator distress 


off Lovenox





Objective





Last 24 Hour Vital Signs








  Date Time  Temp Pulse Resp B/P Pulse Ox O2 Delivery O2 Flow Rate FiO2


 


1/6/17 08:38 98.4 95 15 149/104 92 Nasal Cannula  


 


1/6/17 04:29    167/96    


 


1/6/17 04:00 97.9 88 18 167/96 94 Nasal Cannula 2.0 


 


1/6/17 00:00 97.0 87 18 156/78 98 Room Air  


 


1/5/17 21:50    168/86    


 


1/5/17 21:50    168/86    


 


1/5/17 19:29     95 Nasal Cannula 2.0 28


 


1/5/17 19:29      Nasal Cannula 2.0 28


 


1/5/17 19:28  81 18   Nasal Cannula 2.0 28


 


1/5/17 19:00 97.5 82 20 168/86 93 Nasal Cannula 2.0 


 


1/5/17 16:00 97.7 87 20 153/85 96 Nasal Cannula 2.0 


 


1/5/17 11:32 97.5 87 21 115/86 95 Nasal Cannula 2.0 


 


1/5/17 10:10      Nasal Cannula 2.0 28


 


1/5/17 10:10  80 18   Nasal Cannula 2.0 28


 


1/5/17 10:10     96 Nasal Cannula 2.0 28

















Intake and Output  


 


 1/5/17 1/6/17





 18:59 06:59


 


Intake Total 782 ml 323 ml


 


Output Total 600 ml 1700 ml


 


Balance 182 ml -1377 ml


 


  


 


Intake Oral 240 ml 240 ml


 


IV Total 542 ml 83 ml


 


Output Urine Total 600 ml 1700 ml


 


# Bowel Movements 1 








General Appearance:  no acute distress


HEENT:  normocephalic, atraumatic, PERRL


Respiratory/Chest:  chest wall non-tender, no respiratory distress, no 

accessory muscle use, rhonchi - few scattered


Cardiovascular:  normal rate, regular rhythm, no JVD


Abdomen:  normal bowel sounds, soft, non tender


Genitourinary:  normal external genitalia


Extremities:  no edema, pedal pulses normal


Neurologic/Psychiatric:  abnormal gait - bedridden, alert


Musculoskeletal:  atrophy - BLE


Laboratory Tests


1/5/17 10:30: 


Prothrombin Time 39.8H, Prothromb Time International Ratio 3.8H





Current Medications








 Medications


  (Trade)  Dose


 Ordered  Sig/Toribio


 Route


 PRN Reason  Start Time


 Stop Time Status Last Admin


Dose Admin


 


 Acetaminophen


  (Tylenol)  650 mg  Q4H  PRN


 RECTAL


 Fever/Headache/Mild Pain  1/1/17 21:30


 1/31/17 21:29   


 


 


 Acetaminophen/


 Hydrocodone Bitart


  (Norco 10/325)  1 ea  Q6H  PRN


 ORAL


 Pain Scale (6-10)  1/1/17 19:45


 1/8/17 19:44  1/3/17 15:53


 


 


 Albuterol/


 Ipratropium


  (DuoNeb


 0.5-3(2.5)mg/3ml)  3 ml  Q4H  PRN


 HHN


 Shortness of Breath  1/1/17 21:45


 1/6/17 21:44   


 


 


 Clonidine HCl


  (Catapres)  0.1 mg  Q6H  PRN


 ORAL


 For High Blood Pressure  1/1/17 19:45


 1/31/17 19:44  1/6/17 04:29


 


 


 Dextrose


  (Dextrose 50%)    STAT  PRN


 IV


 Hypoglycemia  1/2/17 01:45


 2/1/17 01:44   


 


 


 Diphenhydramine


 HCl


  (Benadryl)  25 mg  Q6H  PRN


 ORAL


 Itching/Pruritis  1/1/17 19:45


 1/31/17 19:44   


 


 


 Docusate Sodium


  (Colace)  100 mg  DAILY


 ORAL


   1/2/17 09:00


 2/1/17 08:59  1/4/17 11:36


 


 


 Guaifenesin


  (Robitussin)  100 mg  Q4H  PRN


 ORAL


 For Cough  1/1/17 19:00


 1/31/17 18:59  1/2/17 21:17


 


 


 Lorazepam


  (Ativan 2mg/ml


 1ml)  1 mg  Q4H  PRN


 IV


 For Anxiety  1/1/17 21:45


 1/8/17 21:44  1/4/17 17:55


 


 


 Losartan Potassium


  (Cozaar)  50 mg  Q12HR


 ORAL


   1/1/17 21:00


 1/31/17 20:59  1/5/17 21:50


 


 


 Nystatin


  (Nystop Powder)  1 applic  THREE TIMES A  DAY


 TOPIC


   1/4/17 13:00


 2/3/17 12:59  1/5/17 17:40


 


 


 Pantoprazole


  (Protonix)  40 mg  Q12H  PRN


 ORAL


 Abdominal cramps  1/2/17 01:45


 2/1/17 01:44   


 


 


 Warfarin Sodium


  (Coumadin per


 pharmacy)  1 ea  DAILY  PRN


 MISC


 Per rx protocol  1/2/17 09:00


 2/1/17 08:59   


 

















Rogerio ([email or web address])[email or web address] NP Jan 6, 2017 08:49

## 2017-01-06 NOTE — INFECTIOUS DISEASES PROG NOTE
Assessment/Plan


Assessment/Plan


ASSESSMENT: 86-year-old female with:





CONS bacteremia ? contaminant ,  - repeat BCx NGTD, 


TTE EF 65% 


Pneumonia  SCx NRF  Resp viral Panel : +ve for RSV


  Cxray 1/3 [facility] 


  Chest CT: Diffuse bilateral interstitial disease, possibly with micro-

nodularity, disseminated inflammatory lesions a possibility.


UTI , probable - UCx(-)


Fever - resolved, no leukocytosis








Acute BLE DVT SP IVC filter


CVA.


History of brain aneurysm.


Dementia.


NKDA


Full Code








PLAN:








Monitor pt off of AB Rx ( no indication of ribavirin at this point ) 


( SP  IV Rocephin  azithro, IV Vanco d# 7 / 7 ) 


Monitor blood cultures (blood) 


Chest x-ray.


Monitor CBC and BMP





Subjective


Constitutional:  Denies: anorexia, chills, drenching sweats, fatigue, fever, no 

symptoms, other


Allergies:  


Coded Allergies:  


     No Known Allergies (Unverified , 10/4/16)





Objective


Vital Signs





Last 24 Hour Vital Signs








  Date Time  Temp Pulse Resp B/P Pulse Ox O2 Delivery O2 Flow Rate FiO2


 


1/6/17 09:58    149/104    


 


1/6/17 08:38 98.4 95 15 149/104 92 Nasal Cannula  


 


1/6/17 04:29    167/96    


 


1/6/17 04:00 97.9 88 18 167/96 94 Nasal Cannula 2.0 


 


1/6/17 00:00 97.0 87 18 156/78 98 Room Air  


 


1/5/17 21:50    168/86    


 


1/5/17 21:50    168/86    


 


1/5/17 19:29     95 Nasal Cannula 2.0 28


 


1/5/17 19:29      Nasal Cannula 2.0 28


 


1/5/17 19:28  81 18   Nasal Cannula 2.0 28


 


1/5/17 19:00 97.5 82 20 168/86 93 Nasal Cannula 2.0 


 


1/5/17 16:00 97.7 87 20 153/85 96 Nasal Cannula 2.0 


 


1/5/17 11:32 97.5 87 21 115/86 95 Nasal Cannula 2.0 








Height (Feet):  5


Height (Inches):  6.00


Weight (Pounds):  140


HEENT:  anicteric


Respiratory/Chest:  normal breath sounds


Cardiovascular:  regular rhythm


Abdomen:  no organomegaly





Current Medications








 Medications


  (Trade)  Dose


 Ordered  Sig/Toribio


 Route


 PRN Reason  Start Time


 Stop Time Status Last Admin


Dose Admin


 


 Acetaminophen


  (Tylenol)  650 mg  Q4H  PRN


 RECTAL


 Fever/Headache/Mild Pain  1/1/17 21:30


 1/31/17 21:29   


 


 


 Acetaminophen/


 Hydrocodone Bitart


  (Norco 10/325)  1 ea  Q6H  PRN


 ORAL


 Pain Scale (6-10)  1/1/17 19:45


 1/8/17 19:44  1/3/17 15:53


 


 


 Albuterol/


 Ipratropium


  (DuoNeb


 0.5-3(2.5)mg/3ml)  3 ml  Q4H  PRN


 HHN


 Shortness of Breath  1/1/17 21:45


 1/6/17 21:44   


 


 


 Clonidine HCl


  (Catapres)  0.1 mg  Q6H  PRN


 ORAL


 For High Blood Pressure  1/1/17 19:45


 1/31/17 19:44  1/6/17 04:29


 


 


 Dextrose


  (Dextrose 50%)    STAT  PRN


 IV


 Hypoglycemia  1/2/17 01:45


 2/1/17 01:44   


 


 


 Diphenhydramine


 HCl


  (Benadryl)  25 mg  Q6H  PRN


 ORAL


 Itching/Pruritis  1/1/17 19:45


 1/31/17 19:44   


 


 


 Docusate Sodium


  (Colace)  100 mg  DAILY


 ORAL


   1/2/17 09:00


 2/1/17 08:59  1/6/17 09:58


 


 


 Guaifenesin


  (Robitussin)  100 mg  Q4H  PRN


 ORAL


 For Cough  1/1/17 19:00


 1/31/17 18:59  1/2/17 21:17


 


 


 Lorazepam


  (Ativan 2mg/ml


 1ml)  1 mg  Q4H  PRN


 IV


 For Anxiety  1/1/17 21:45


 1/8/17 21:44  1/4/17 17:55


 


 


 Losartan Potassium


  (Cozaar)  50 mg  Q12HR


 ORAL


   1/1/17 21:00


 1/31/17 20:59  1/6/17 09:58


 


 


 Nystatin


  (Nystop Powder)  1 applic  THREE TIMES A  DAY


 TOPIC


   1/4/17 13:00


 2/3/17 12:59  1/5/17 17:40


 


 


 Pantoprazole


  (Protonix)  40 mg  Q12H  PRN


 ORAL


 Abdominal cramps  1/2/17 01:45


 2/1/17 01:44   


 


 


 Warfarin Sodium


  (Coumadin per


 pharmacy)  1 ea  DAILY  PRN


 MISC


 Per rx protocol  1/2/17 09:00


 2/1/17 08:59   


 

















BRIANDA PAYNE M.D. Jan 6, 2017 11:11

## 2017-01-06 NOTE — NEPHROLOGY PROGRESS NOTE
Assessment/Plan


Problem List:  


(1) Dyspnea


(2) Respiratory distress


(3) Congestive heart failure (CHF)


(4) HCAP (healthcare-associated pneumonia)


Plan


Cont abx per ID 


pulm hygiene. 


f/u recs per pulm and cardio.


AM labs





Subjective


ROS Limited/Unobtainable:  Yes


Subjective


In bed, in no distress, confused





Objective


Objective





Last 24 Hour Vital Signs








  Date Time  Temp Pulse Resp B/P Pulse Ox O2 Delivery O2 Flow Rate FiO2


 


1/6/17 16:00 98.0 100 17 179/93 92 Nasal Cannula  


 


1/6/17 11:58 97.7 91 16 149/94 95 Nasal Cannula  


 


1/6/17 09:58    149/104    


 


1/6/17 09:55     95 Nasal Cannula 2.0 28


 


1/6/17 09:55      Nasal Cannula 2.0 28


 


1/6/17 09:55  89 16   Nasal Cannula 2.0 28


 


1/6/17 08:38 98.4 95 15 149/104 92 Nasal Cannula  


 


1/6/17 04:29    167/96    


 


1/6/17 04:00 97.9 88 18 167/96 94 Nasal Cannula 2.0 


 


1/6/17 00:00 97.0 87 18 156/78 98 Room Air  


 


1/5/17 21:50    168/86    


 


1/5/17 21:50    168/86    


 


1/5/17 19:29     95 Nasal Cannula 2.0 28


 


1/5/17 19:29      Nasal Cannula 2.0 28


 


1/5/17 19:28  81 18   Nasal Cannula 2.0 28


 


1/5/17 19:00 97.5 82 20 168/86 93 Nasal Cannula 2.0 

















Intake and Output  


 


 1/5/17 1/6/17





 19:00 07:00


 


Intake Total 782 ml 323 ml


 


Output Total 600 ml 1700 ml


 


Balance 182 ml -1377 ml


 


  


 


Intake Oral 240 ml 240 ml


 


IV Total 542 ml 83 ml


 


Output Urine Total 600 ml 1700 ml


 


# Bowel Movements 1 








Laboratory Tests


1/6/17 14:40: 


Prothrombin Time 27.6H, Prothromb Time International Ratio 2.6H


Height (Feet):  5


Height (Inches):  6.00


Weight (Pounds):  140


General Appearance:  confused


EENT:  normal ENT inspection


Neck:  normal alignment, supple


Cardiovascular:  normal rate, regular rhythm, no JVD


Respiratory/Chest:  normal breath sounds, no respiratory distress


Abdomen:  normal bowel sounds, non tender, soft


Extremities:  normal range of motion, non-tender


Neurologic:  disoriented











Alisia Oakes N.P. Jan 6, 2017 18:35

## 2017-01-06 NOTE — GENERAL PROGRESS NOTE
Assessment/Plan


Assessment/Plan


ASSESSMENT:


#. DVT of the bilateral lower extremities - s/p ivc permanent IVC filter and on 

coumadin, goal INR 2-3


#. Anemia potentially secondary to anemia of chronic disease - stable


#. Leukocytosis. Improved


#. Coagulopathy 2/2 coumadin


#. Trochanteric fracture of the right femur status post open reduction and 

internal fixation and repair


#. Sepsis


#. PNA





RECOMMENDATIONS:


1. Continue coumadin


2. D/C lovenox


3. Goal INR 2-3 


4. Continue antibiotics as needed


5. Transfuse if hemoglobin < 7.5


6. Continue nutritional support.


7. Followup pulm, cards, renal recs


8.  Staff





Thank you,





Daniel Sierra MD





Subjective


Constitutional:  Reports: no symptoms


HEENT:  Reports: no symptoms


Cardiovascular:  Reports: no symptoms


Respiratory:  Reports: no symptoms


Gastrointestinal/Abdominal:  Reports: poor appetite


Genitourinary:  Reports: no symptoms


Neurologic/Psychiatric:  Reports: no symptoms


Endocrine:  Reports: no symptoms


Hematologic/Lymphatic:  Reports: anemia


Allergies:  


Coded Allergies:  


     No Known Allergies (Unverified , 10/4/16)


Subjective


stable, is not bleeding, tolerating coumadin





Objective





Last 24 Hour Vital Signs








  Date Time  Temp Pulse Resp B/P Pulse Ox O2 Delivery O2 Flow Rate FiO2


 


1/6/17 16:00 98.0 100 17 179/93 92 Nasal Cannula  


 


1/6/17 11:58 97.7 91 16 149/94 95 Nasal Cannula  


 


1/6/17 09:58    149/104    


 


1/6/17 09:55     95 Nasal Cannula 2.0 28


 


1/6/17 09:55      Nasal Cannula 2.0 28


 


1/6/17 09:55  89 16   Nasal Cannula 2.0 28


 


1/6/17 08:38 98.4 95 15 149/104 92 Nasal Cannula  


 


1/6/17 04:29    167/96    


 


1/6/17 04:00 97.9 88 18 167/96 94 Nasal Cannula 2.0 


 


1/6/17 00:00 97.0 87 18 156/78 98 Room Air  


 


1/5/17 21:50    168/86    


 


1/5/17 21:50    168/86    

















Intake and Output  


 


 1/5/17 1/6/17





 19:00 07:00


 


Intake Total 782 ml 323 ml


 


Output Total 600 ml 1700 ml


 


Balance 182 ml -1377 ml


 


  


 


Intake Oral 240 ml 240 ml


 


IV Total 542 ml 83 ml


 


Output Urine Total 600 ml 1700 ml


 


# Bowel Movements 1 








Laboratory Tests


1/6/17 14:40: 


Prothrombin Time 27.6H, Prothromb Time International Ratio 2.6H


Height (Feet):  5


Height (Inches):  6.00


Weight (Pounds):  140


General Appearance:  no apparent distress


EENT:  TMs normal


Neck:  normal alignment


Cardiovascular:  normal rate


Respiratory/Chest:  chest wall non-tender


Abdomen:  non tender


Extremities:  non-tender


Edema:  no edema noted Leg (L), no edema noted Leg (R)


Edema:  mild edema


Neurologic:  alert


Skin:  warm/dry











Daniel Sierra Jan 6, 2017 19:33

## 2017-01-07 NOTE — GENERAL PROGRESS NOTE
Assessment/Plan


Assessment/Plan


ASSESSMENT:


#. DVT of the bilateral lower extremities - s/p ivc permanent IVC filter and on 

coumadin, goal INR 2-3


#. Anemia potentially secondary to anemia of chronic disease - stable


#. Leukocytosis. Improved


#. Coagulopathy 2/2 coumadin


#. Trochanteric fracture of the right femur status post open reduction and 

internal fixation and repair


#. Sepsis


#. PNA





RECOMMENDATIONS:


1. Continue coumadin


2. D/C lovenox


3. Goal INR 2-3 


4. Continue antibiotics prn basis


5. Transfuse if hemoglobin < 7.0


6. Continue nutritional support.


7. Followup pulm, cards, renal recs


8.  Staff





Thank you,





Lesley Sierra MD





Subjective


Constitutional:  Reports: no symptoms


HEENT:  Reports: no symptoms


Cardiovascular:  Reports: no symptoms


Respiratory:  Reports: no symptoms


Gastrointestinal/Abdominal:  Reports: poor appetite


Genitourinary:  Reports: no symptoms


Neurologic/Psychiatric:  Reports: no symptoms


Endocrine:  Reports: no symptoms


Hematologic/Lymphatic:  Reports: anemia


Allergies:  


Coded Allergies:  


     No Known Allergies (Unverified , 10/4/16)


Subjective


stable, no fevers or chills, not bleeding





Objective





Last 24 Hour Vital Signs








  Date Time  Temp Pulse Resp B/P Pulse Ox O2 Delivery O2 Flow Rate FiO2


 


1/7/17 09:15    126/84    


 


1/7/17 07:00  72 18   Nasal Cannula 2.0 28


 


1/7/17 07:00      Nasal Cannula 2.0 28


 


1/7/17 07:00     98 Nasal Cannula 2.0 28


 


1/7/17 04:00 98.2 69 19 126/84 98 Nasal Cannula 2.0 


 


1/7/17 00:00 99.7 78 18 121/88 96 Nasal Cannula 2.0 


 


1/6/17 20:49     97 Nasal Cannula 2.0 28


 


1/6/17 20:49      Nasal Cannula 2.0 28


 


1/6/17 20:48  104 18   Nasal Cannula 2.0 28


 


1/6/17 20:28    161/89    


 


1/6/17 20:27    161/89    


 


1/6/17 20:00 97.7 98 18 161/93 96 Nasal Cannula 2.0 


 


1/6/17 18:00    153/90    


 


1/6/17 16:00 98.0 100 17 179/93 92 Nasal Cannula  


 


1/6/17 11:58 97.7 91 16 149/94 95 Nasal Cannula  


 


1/6/17 09:58    149/104    


 


1/6/17 09:55     95 Nasal Cannula 2.0 28


 


1/6/17 09:55      Nasal Cannula 2.0 28


 


1/6/17 09:55  89 16   Nasal Cannula 2.0 28

















Intake and Output  


 


 1/6/17 1/7/17





 19:00 07:00


 


Intake Total 400 ml 320 ml


 


Output Total 300 ml 400 ml


 


Balance 100 ml -80 ml


 


  


 


Intake Oral 400 ml 320 ml


 


Output Urine Total 300 ml 400 ml








Laboratory Tests


1/6/17 14:40: 


Prothrombin Time 27.6H, Prothromb Time International Ratio 2.6H


1/7/17 05:20: 


Prothrombin Time 21.8H, Prothromb Time International Ratio 2.1H, White Blood 

Count 7.3, Red Blood Count 4.24, Hemoglobin 12.9, Hematocrit 38.6, Mean 

Corpuscular Volume 91, Mean Corpuscular Hemoglobin 30.4, Mean Corpuscular 

Hemoglobin Concent 33.4, Red Cell Distribution Width 12.9, Platelet Count 202, 

Mean Platelet Volume 8.4, Neutrophils (%) (Auto) 62.9, Lymphocytes (%) (Auto) 

23.2, Monocytes (%) (Auto) 8.9, Eosinophils (%) (Auto) 4.0H, Basophils (%) (Auto

) 0.9, Sodium Level 148H, Potassium Level 2.9L, Chloride Level 104, Carbon 

Dioxide Level 33H, Anion Gap 11, Blood Urea Nitrogen 10, Creatinine 0.6, 

Estimat Glomerular Filtration Rate , Glucose Level 120H, Calcium Level 9.7


Height (Feet):  5


Height (Inches):  6.00


Weight (Pounds):  140


General Appearance:  no apparent distress


EENT:  TMs normal


Neck:  supple


Cardiovascular:  regular rhythm


Respiratory/Chest:  lungs clear


Abdomen:  soft


Extremities:  normal inspection


Edema:  1+ Leg (L), 1+ Leg (R)


Edema:  mild edema


Neurologic:  alert


Skin:  warm/dry











LESLEY SIERRA Jan 7, 2017 09:53

## 2017-01-07 NOTE — CARDIAC ELECTROPHYSIOLOGY PN
Assessment/Plan


Status Narrative


Echo 10/13/2016


M-mode measurements of left ventricle not obtainable due to cardiac position 


(angle)


Normal left ventricular chamber size, systolic function and wall motion to 

extent 


visualized.


Left ventricular ejection fraction estimated to be  65 %.


Moderate left ventricular hypertrophy by 2-D.


No evidence of pericardial effusion. 


All other cardiac chamber sizes are within normal limits. 


Focal aortic valve sclerosis with adequate cusp excursion.


Thickened mitral valve leaflets with normal excursion.


Mitral annulus and aortic root calcification.


Pulmonic valve not well visualized.


Normal tricuspid valve structure. 


IVC at normal size with physiologic collapse.





A  color flow and spectral Doppler study was performed and revealed:


Mild aortic regurgitation.


Mild mitral regurgitation.


Mitral diastolic velocities suggest reduced left ventricular relaxation c/w 

mild LV 


diastolic dysfunction (Grade I). 


Trace to mild tricuspid regurgitation.


Tricuspid  systolic velocities suggests peak right ventricular systolic 

pressure of  31  


mmHg.


Assessment/Plan


1. Shortness of breath and bilateral LE DVT. Chest CT angiogram , no pulmonary 

embolism.  Echo 10/2016 EF 65%. No MI.


2. Hypertension. On Cozaar 50 mg b.i.d.  


3. Pneumonia  


4. Bilateral LE DVT on Coumadin. S/P IVC filter.


5. Dysphagia.





DW RN





Subjective


Subjective


Still Confused and at times agitated per sitter.  No new events overnight.





Objective





Last 24 Hour Vital Signs








  Date Time  Temp Pulse Resp B/P Pulse Ox O2 Delivery O2 Flow Rate FiO2


 


1/7/17 10:16 98.2       


 


1/7/17 09:15    126/84    


 


1/7/17 07:00  72 18   Nasal Cannula 2.0 28


 


1/7/17 07:00      Nasal Cannula 2.0 28


 


1/7/17 07:00     98 Nasal Cannula 2.0 28


 


1/7/17 04:00 98.2 69 19 126/84 98 Nasal Cannula 2.0 


 


1/7/17 00:00 99.7 78 18 121/88 96 Nasal Cannula 2.0 


 


1/6/17 20:49     97 Nasal Cannula 2.0 28


 


1/6/17 20:49      Nasal Cannula 2.0 28


 


1/6/17 20:48  104 18   Nasal Cannula 2.0 28


 


1/6/17 20:28    161/89    


 


1/6/17 20:27    161/89    


 


1/6/17 20:00 97.7 98 18 161/93 96 Nasal Cannula 2.0 


 


1/6/17 18:00    153/90    


 


1/6/17 16:00 98.0 100 17 179/93 92 Nasal Cannula  

















Intake and Output  


 


 1/6/17 1/7/17





 19:00 07:00


 


Intake Total 400 ml 320 ml


 


Output Total 300 ml 400 ml


 


Balance 100 ml -80 ml


 


  


 


Intake Oral 400 ml 320 ml


 


Output Urine Total 300 ml 400 ml











Laboratory Tests








Test


  1/6/17


14:40 1/7/17


05:20


 


Prothrombin Time


  27.6 SEC


(9.30-11.50)  H 21.8 SEC


(9.30-11.50)  H


 


Prothromb Time International


Ratio 2.6 (0.9-1.1)


H 2.1 (0.9-1.1)


H


 


White Blood Count


  


  7.3 K/UL


(4.8-10.8)


 


Red Blood Count


  


  4.24 M/UL


(4.20-5.40)


 


Hemoglobin


  


  12.9 G/DL


(12.0-16.0)


 


Hematocrit


  


  38.6 %


(37.0-47.0)


 


Mean Corpuscular Volume  91 FL (80-99)  


 


Mean Corpuscular Hemoglobin


  


  30.4 PG


(27.0-31.0)


 


Mean Corpuscular Hemoglobin


Concent 


  33.4 G/DL


(32.0-36.0)


 


Red Cell Distribution Width


  


  12.9 %


(11.6-14.8)


 


Platelet Count


  


  202 K/UL


(150-450)


 


Mean Platelet Volume


  


  8.4 FL


(6.5-10.1)


 


Neutrophils (%) (Auto)


  


  62.9 %


(45.0-75.0)


 


Lymphocytes (%) (Auto)


  


  23.2 %


(20.0-45.0)


 


Monocytes (%) (Auto)


  


  8.9 %


(1.0-10.0)


 


Eosinophils (%) (Auto)


  


  4.0 %


(0.0-3.0)  H


 


Basophils (%) (Auto)


  


  0.9 %


(0.0-2.0)


 


Sodium Level


  


  148 mEQ/L


(135-145)  H


 


Potassium Level


  


  2.9 mEQ/L


(3.4-4.9)  L


 


Chloride Level


  


  104 mEQ/L


()


 


Carbon Dioxide Level


  


  33 mEQ/L


(20-30)  H


 


Anion Gap  11 (5-15)  


 


Blood Urea Nitrogen


  


  10 mg/dL


(7-23)


 


Creatinine


  


  0.6 mg/dL


(0.5-0.9)


 


Estimat Glomerular Filtration


Rate 


   mL/min (>60)  


 


 


Glucose Level


  


  120 mg/dL


()  H


 


Calcium Level


  


  9.7 mg/dL


(8.6-10.2)








Objective


HEAD AND NECK:  Shows no JVD.


LUNGS:  Coarse rhonchi.


CARDIOVASCULAR:  Regular S1 and S2 with no gallop or murmur.


ABDOMEN:  Soft.


EXTREMITIES:  No pitting edema.











MALCOLM ZULUAGA Jan 7, 2017 13:16

## 2017-01-07 NOTE — DIAGNOSTIC IMAGING REPORT
Indication: Dyspnea



Comparison:  1/3/17



A single view chest radiograph was obtained.



Findings:



Heart is enlarged. Aorta is enlarged and calcified. Interstitium and 

pulmonary

vascularity are mildly prominent. Bones are osteopenic.



Impression:



No acute disease

## 2017-01-07 NOTE — PULMONOLOGY PROGRESS NOTE
Assessment/Plan


Assessment/Plan


covering for dr Bush





ASSESSMENT


sepsis 


bacteremia ( CONS 4/4) 


r/o SBE, r/o septic thrombophlebitis


PNA 


probable UTI 


dementia 


Hx of CVA 


Hx of brain aneurysm 


HTN 


bilateral DVT LE, s/p IVC filter 


dysphagia  





PLAN OF CARE


MS floor 


O2 HHN prn 


CXR 1/3 no acute disease 


abx, ID follows 


antitussive prn 


last set of blood cx negative preliminary  ( prior 4/4 + CONS)


ECHO with preserved EF 65 %and RVSP of 31, mild LVH, no evidence of vegetation


cardio follows


BP management with ARB, stable   


Venous Duplex + bilateral LE DVT, s/p IVC filter 


CTA no PE 


on Coumadin per pharmacy , INR-2.1 therapeutic 


K replaced, check K and Mg in am 


GI prophylaxis 


aspiration precautions 


diet as per ST recommendation , however appetite poor and coughing with this 

diet may need PEG - per PMD   





case discussed and evaluated by supervising physician





Subjective


Allergies:  


Coded Allergies:  


     No Known Allergies (Unverified , 10/4/16)


Subjective


afebrile, no leucocytosis, 


no signs of respirator distress 


off Lovenox 


K-2.9





Objective





Last 24 Hour Vital Signs








  Date Time  Temp Pulse Resp B/P Pulse Ox O2 Delivery O2 Flow Rate FiO2


 


1/7/17 12:00 98.3  17 132/66  Nasal Cannula 2.0 


 


1/7/17 10:16 98.2       


 


1/7/17 09:15    126/84    


 


1/7/17 08:55 97.5  17 148/79 96 Nasal Cannula  


 


1/7/17 07:00  72 18   Nasal Cannula 2.0 28


 


1/7/17 07:00      Nasal Cannula 2.0 28


 


1/7/17 07:00     98 Nasal Cannula 2.0 28


 


1/7/17 04:00 98.2 69 19 126/84 98 Nasal Cannula 2.0 


 


1/7/17 00:00 99.7 78 18 121/88 96 Nasal Cannula 2.0 


 


1/6/17 20:49     97 Nasal Cannula 2.0 28


 


1/6/17 20:49      Nasal Cannula 2.0 28


 


1/6/17 20:48  104 18   Nasal Cannula 2.0 28


 


1/6/17 20:28    161/89    


 


1/6/17 20:27    161/89    


 


1/6/17 20:00 97.7 98 18 161/93 96 Nasal Cannula 2.0 


 


1/6/17 18:00    153/90    


 


1/6/17 16:00 98.0 100 17 179/93 92 Nasal Cannula  

















Intake and Output  


 


 1/6/17 1/7/17





 19:00 07:00


 


Intake Total 400 ml 320 ml


 


Output Total 300 ml 400 ml


 


Balance 100 ml -80 ml


 


  


 


Intake Oral 400 ml 320 ml


 


Output Urine Total 300 ml 400 ml








Objective


General Appearance:  no acute distress


HEENT:  normocephalic, atraumatic, PERRL


Respiratory/Chest:  chest wall non-tender, no respiratory distress, no 

accessory muscle use, rhonchi - few scattered


Cardiovascular:  normal rate, regular rhythm, no JVD


Abdomen:  normal bowel sounds, soft, non tender


Genitourinary:  normal external genitalia


Extremities:  no edema, pedal pulses normal


Neurologic/Psychiatric:  abnormal gait - bedridden, alert


Musculoskeletal:  atrophy - BLE


Laboratory Tests


1/7/17 05:20: 


White Blood Count 7.3, Red Blood Count 4.24, Hemoglobin 12.9, Hematocrit 38.6, 

Mean Corpuscular Volume 91, Mean Corpuscular Hemoglobin 30.4, Mean Corpuscular 

Hemoglobin Concent 33.4, Red Cell Distribution Width 12.9, Platelet Count 202, 

Mean Platelet Volume 8.4, Neutrophils (%) (Auto) 62.9, Lymphocytes (%) (Auto) 

23.2, Monocytes (%) (Auto) 8.9, Eosinophils (%) (Auto) 4.0H, Basophils (%) (Auto

) 0.9, Prothrombin Time 21.8H, Prothromb Time International Ratio 2.1H, Sodium 

Level 148H, Potassium Level 2.9L, Chloride Level 104, Carbon Dioxide Level 33H, 

Anion Gap 11, Blood Urea Nitrogen 10, Creatinine 0.6, Estimat Glomerular 

Filtration Rate , Glucose Level 120H, Calcium Level 9.7





Current Medications








 Medications


  (Trade)  Dose


 Ordered  Sig/Toribio


 Route


 PRN Reason  Start Time


 Stop Time Status Last Admin


Dose Admin


 


 Acetaminophen


  (Tylenol)  650 mg  Q4H  PRN


 RECTAL


 Fever/Headache/Mild Pain  1/1/17 21:30


 1/31/17 21:29   


 


 


 Acetaminophen/


 Hydrocodone Bitart


  (Norco 10/325)  1 ea  Q6H  PRN


 ORAL


 Pain Scale (6-10)  1/1/17 19:45


 1/8/17 19:44  1/7/17 09:15


 


 


 Albuterol/


 Ipratropium


  (DuoNeb


 0.5-3(2.5)mg/3ml)  3 ml  Q4H  PRN


 HHN


 Shortness of Breath  1/6/17 13:45


 1/11/17 23:59   


 


 


 Clonidine HCl


  (Catapres)  0.1 mg  Q6H  PRN


 ORAL


 For High Blood Pressure  1/1/17 19:45


 1/31/17 19:44  1/6/17 20:28


 


 


 Dextrose


  (Dextrose 50%)    STAT  PRN


 IV


 Hypoglycemia  1/2/17 01:45


 2/1/17 01:44   


 


 


 Diphenhydramine


 HCl


  (Benadryl)  25 mg  Q6H  PRN


 ORAL


 Itching/Pruritis  1/1/17 19:45


 1/31/17 19:44   


 


 


 Docusate Sodium


  (Colace)  100 mg  DAILY


 ORAL


   1/2/17 09:00


 2/1/17 08:59  1/7/17 09:15


 


 


 Guaifenesin


  (Robitussin)  100 mg  Q4H  PRN


 ORAL


 For Cough  1/1/17 19:00


 1/31/17 18:59  1/2/17 21:17


 


 


 Lorazepam


  (Ativan 2mg/ml


 1ml)  1 mg  Q4H  PRN


 IV


 For Anxiety  1/1/17 21:45


 1/8/17 21:44  1/4/17 17:55


 


 


 Losartan Potassium


  (Cozaar)  50 mg  Q12HR


 ORAL


   1/1/17 21:00


 1/31/17 20:59  1/7/17 09:15


 


 


 Nystatin


  (Nystop Powder)  1 applic  THREE TIMES A  DAY


 TOPIC


   1/4/17 13:00


 2/3/17 12:59  1/6/17 20:28


 


 


 Pantoprazole


  (Protonix)  40 mg  Q12H  PRN


 ORAL


 Abdominal cramps  1/2/17 01:45


 2/1/17 01:44  1/7/17 09:15


 


 


 Potassium Chloride


  (K-Dur)  40 meq  ONCE  ONCE


 ORAL


   1/7/17 16:00


 1/7/17 16:01   


 


 


 Warfarin Sodium


  (Coumadin per


 pharmacy)  1 ea  DAILY  PRN


 MISC


 Per rx protocol  1/2/17 09:00


 2/1/17 08:59   


 


 


 Warfarin Sodium


  (Coumadin)  2.5 mg  ONCE  ONCE


 ORAL


   1/7/17 17:00


 1/7/17 17:01   


 

















Rogerio ([facility]),Erlinda BORGES Jan 7, 2017 15:14

## 2017-01-07 NOTE — NEPHROLOGY PROGRESS NOTE
Assessment/Plan


Problem List:  


(1) Gram positive bacterial infection


(2) Congestive heart failure (CHF)


(3) Respiratory distress


(4) HCAP (healthcare-associated pneumonia)


(5) Dyspnea


(6) Alzheimer's dementia


(7) Dysphagia


(8) CVA (cerebral vascular accident)


(9) HTN (hypertension)


Plan


cont abx per ID. 


cardio following. 


d/c planning. will need home O2.





Subjective


Subjective


no acute events.





Objective


Objective





Last 24 Hour Vital Signs








  Date Time  Temp Pulse Resp B/P Pulse Ox O2 Delivery O2 Flow Rate FiO2


 


1/7/17 09:15    126/84    


 


1/7/17 07:00  72 18   Nasal Cannula 2.0 28


 


1/7/17 07:00      Nasal Cannula 2.0 28


 


1/7/17 07:00     98 Nasal Cannula 2.0 28


 


1/7/17 04:00 98.2 69 19 126/84 98 Nasal Cannula 2.0 


 


1/7/17 00:00 99.7 78 18 121/88 96 Nasal Cannula 2.0 


 


1/6/17 20:49     97 Nasal Cannula 2.0 28


 


1/6/17 20:49      Nasal Cannula 2.0 28


 


1/6/17 20:48  104 18   Nasal Cannula 2.0 28


 


1/6/17 20:28    161/89    


 


1/6/17 20:27    161/89    


 


1/6/17 20:00 97.7 98 18 161/93 96 Nasal Cannula 2.0 


 


1/6/17 18:00    153/90    


 


1/6/17 16:00 98.0 100 17 179/93 92 Nasal Cannula  


 


1/6/17 11:58 97.7 91 16 149/94 95 Nasal Cannula  


 


1/6/17 09:58    149/104    


 


1/6/17 09:55     95 Nasal Cannula 2.0 28


 


1/6/17 09:55      Nasal Cannula 2.0 28


 


1/6/17 09:55  89 16   Nasal Cannula 2.0 28

















Intake and Output  


 


 1/6/17 1/7/17





 19:00 07:00


 


Intake Total 400 ml 320 ml


 


Output Total 300 ml 400 ml


 


Balance 100 ml -80 ml


 


  


 


Intake Oral 400 ml 320 ml


 


Output Urine Total 300 ml 400 ml








Laboratory Tests


1/6/17 14:40: 


Prothrombin Time 27.6H, Prothromb Time International Ratio 2.6H


1/7/17 05:20: 


Prothrombin Time 21.8H, Prothromb Time International Ratio 2.1H, White Blood 

Count 7.3, Red Blood Count 4.24, Hemoglobin 12.9, Hematocrit 38.6, Mean 

Corpuscular Volume 91, Mean Corpuscular Hemoglobin 30.4, Mean Corpuscular 

Hemoglobin Concent 33.4, Red Cell Distribution Width 12.9, Platelet Count 202, 

Mean Platelet Volume 8.4, Neutrophils (%) (Auto) 62.9, Lymphocytes (%) (Auto) 

23.2, Monocytes (%) (Auto) 8.9, Eosinophils (%) (Auto) 4.0H, Basophils (%) (Auto

) 0.9, Sodium Level 148H, Potassium Level 2.9L, Chloride Level 104, Carbon 

Dioxide Level 33H, Anion Gap 11, Blood Urea Nitrogen 10, Creatinine 0.6, 

Estimat Glomerular Filtration Rate , Glucose Level 120H, Calcium Level 9.7


Height (Feet):  5


Height (Inches):  6.00


Weight (Pounds):  140


General Appearance:  no apparent distress


Cardiovascular:  normal rate, regular rhythm


Respiratory/Chest:  decreased breath sounds


Abdomen:  non tender, soft











PARK ALY Jan 7, 2017 09:53

## 2017-01-07 NOTE — WOUND CARE CONSULTATION
Wound Assessment


Wound Assessment #1:  


   Wound Present on Admission:  Yes


   New Wound:  No


   Status Change of Wound:  No


   Wound Location Body Site Modif:  right


   Wound Location Body Site:  thigh - upper inner thigh


   Wound Type:  chemical burn


   Gagan Test:  Does not Gagan


   Percent of Wound Pink/Red:  100


   Wound Drainage Amount:  None


   Wound Drainage Odor:  None/Absent


   Tissue Surrounding Wound:  Macerated


   Wound General Appearance:  Reddened


Wound Assessment #2:  


   Wound Number:  #2


   Wound Present on Admission:  Yes


   New Wound:  No


   Status Change of Wound:  No


   Wound Location Body Site Modif:  left


   Wound Location Body Site:  thigh - upper inner thigh


   Wound Type:  chemical burn


   Gagan Test:  Does not Gagan


   Percent of Wound Pink/Red:  100


   Wound Drainage Amount:  None


   Wound Drainage Odor:  None/Absent


   Tissue Surrounding Wound:  Macerated


   Wound General Appearance:  Reddened


Wound Assessment #3:  


   Wound Number:  #3


   Wound Present on Admission:  Yes


   New Wound:  No


   Status Change of Wound:  No


   Wound Location Body Site:  perineal area


   Wound Type:  chemical burn


   Gagan Test:  Does not Gagan


   Percent of Wound Pink/Red:  100


   Wound Drainage Amount:  None


   Wound Drainage Odor:  None/Absent


   Tissue Surrounding Wound:  Intact


   Wound General Appearance:  Reddened


Wound Assessment #4:  


   Wound Number:  #4


   Wound Present on Admission:  Yes


   New Wound:  No


   Status Change of Wound:  No


   Wound Location Body Site Modif:  mid


   Wound Location Body Site:  sacral


   Wound Type:  pressure ulcer


   Gagan Test:  Does not Gagan


   Pressure Ulcer Stage:  I


   Wound Thickness:  Partial Thickness


   Wound Length:  1.0


   Wound Width:  1.0


   Percent of Wound Pink/Red:  100


   Wound Drainage Amount:  None


   Wound Drainage Odor:  None/Absent


   Tissue Surrounding Wound:  Intact


   Wound General Appearance:  Reddened


Wound Assessment #5:  


   Wound Number:  #5


   Wound Present on Admission:  Yes


   New Wound:  No


   Status Change of Wound:  No


   Wound Location Body Site Modif:  right


   Wound Location Body Site:  buttocks


   Wound Type:  pressure ulcer


   Pressure Ulcer Stage:  deep tissue injury


   Wound Drainage Amount:  None


   Wound Drainage Odor:  None/Absent


   Tissue Surrounding Wound:  Intact - resolved.


   Wound General Appearance:  Open to air, Clean/Dry


Wound Assessment #6:  


   Wound Number:  #6


   Wound Present on Admission:  No


   New Wound:  Yes


   Status Change of Wound:  No


   Wound Location Body Site Modif:  right


   Wound Location Body Site:  buttocks


   Wound Type:  other - self-inflicted scratches.


   Wound Thickness:  Partial Thickness


   Wound Length:  1.0


   Wound Width:  1.0


   Percent of Wound Pink/Red:  100


   Wound Drainage Amount:  None


   Wound Drainage Odor:  None/Absent


   Tissue Surrounding Wound:  Erythemic


Wound Assessment #7:  


   Wound Number:  #7


   Wound Present on Admission:  No


   New Wound:  Yes


   Status Change of Wound:  No


   Wound Location Body Site Modif:  left


   Wound Location Body Site:  buttocks


   Wound Type:  other - self- inflicted scratches


   Gagan Test:  Does not Gagan


   Wound Thickness:  Partial Thickness


   Wound Length:  1.0


   Wound Width:  1.0


   Percent of Wound Pink/Red:  100


   Wound Drainage Amount:  None


   Wound Drainage Odor:  None/Absent


   Tissue Surrounding Wound:  Erythemic


Wound Comment


#1 Left upper inner thigh chemical burn.


#2 Right upper inner thigh chemical burn.


#3 Perineal area chemical burn.


#4 Mid Sacral Pressure Ulcer Stage I.


#5 Right Buttock Admitted Deep Tissue Injury -Resolved.


#6 Right Buttock Self -Inflicted Scratches.


#7 Left Buttock Self-Inflicted Scratches.





Recommendation


-Apply Low Air Loss Overlay.


-Local wound care as ordered.


-Keep clean and dry.


-Gentle pericare.


-Optimize nutrition.


-Heel Protectors as needed.


-Offload both heels and feetr.


-Turn and reposition.


-Avoid shear and friction to skin.


-Assess and follow up with MD if any changes are noted.








Upon body reassessment noted good progress to all admitted sites, noted 

decrease in size to mid sacral , perineal area,left upper inner thigh and right 

upper inner thigh noted with less chemical burn present, Right Buttock Deep 

tissue injury noted resolved.Current treatment is effective .





Upon assessment however noted patient scratching buttocks noted self -inflicted 

scratches to left and right buttocks,


site cleansed ,pat dry, applied skin barrier cream.











VEDA FLOWERS Jan 7, 2017 08:44

## 2017-01-08 NOTE — PULMONOLOGY PROGRESS NOTE
Assessment/Plan


Assessment/Plan


covering for dr Bush





ASSESSMENT


sepsis 


bacteremia ( CONS 4/4) 


r/o SBE, r/o septic thrombophlebitis


PNA 


probable UTI 


dementia 


Hx of CVA 


Hx of brain aneurysm 


HTN 


bilateral DVT LE, s/p IVC filter 


dysphagia  





PLAN OF CARE


[facility] floor 


O2 HHN prn 


CXR 1/3 no acute disease 


abx, ID follows 


antitussive prn 


last set of blood cx negative preliminary  ( prior 4/4 + CONS)


ECHO with preserved EF 65 %and RVSP of 31, mild LVH, no evidence of vegetation


cardio follows


BP management with ARB, stable   


Venous Duplex + bilateral LE DVT, s/p IVC filter 


CTA no PE 


on Coumadin per pharmacy , INR-2.1 therapeutic 


K  and Mg  stable after replacement of K 


GI prophylaxis 


aspiration precautions 


diet as per ST recommendation , however appetite poor and coughing with this 

diet may need PEG - per PMD   





case discussed and evaluated by supervising physician





Subjective


Allergies:  


Coded Allergies:  


     No Known Allergies (Unverified , 10/4/16)


Subjective


afebrile, no leucocytosis, 


no signs of respirator distress 


off Lovenox





Objective





Last 24 Hour Vital Signs








  Date Time  Temp Pulse Resp B/P Pulse Ox O2 Delivery O2 Flow Rate FiO2


 


1/8/17 11:58 97.3 76 20 157/87 97 Room Air  


 


1/8/17 10:34    141/65    


 


1/8/17 08:29 97.5 61 20 141/65 97 Nasal Cannula  


 


1/8/17 08:00      Nasal Cannula 2.0 28


 


1/8/17 08:00     97 Nasal Cannula 2.0 28


 


1/8/17 08:00  61 18   Nasal Cannula 2.0 28


 


1/8/17 04:00 97.0 64 18 119/59 95 Nasal Cannula 2.0 


 


1/8/17 00:00 97.2 95 18 117/60 95 Nasal Cannula 2.0 


 


1/7/17 22:07  75 18   Nasal Cannula 2.0 28


 


1/7/17 22:07      Nasal Cannula 2.0 28


 


1/7/17 22:07     98 Nasal Cannula 2.0 28


 


1/7/17 21:06    148/92    


 


1/7/17 20:00 97.7  18 148/92 97 Nasal Cannula 2.0 


 


1/7/17 19:00 97.7 79 20 148/92 95 Nasal Cannula 2.0 


 


1/7/17 18:49    162/80    


 


1/7/17 15:51 97.2 75 20 174/65 97 Nasal Cannula 2.0 

















Intake and Output  


 


 1/7/17 1/8/17





 19:00 07:00


 


Intake Total 250 ml 120 ml


 


Output Total  550 ml


 


Balance 250 ml -430 ml


 


  


 


Intake Oral 250 ml 120 ml


 


Output Urine Total  550 ml


 


# Bowel Movements 2 1








Objective


General Appearance:  no acute distress


HEENT:  normocephalic, atraumatic, PERRL


Respiratory/Chest:  chest wall non-tender, no respiratory distress, no 

accessory muscle use, rhonchi - few scattered


Cardiovascular:  normal rate, regular rhythm, no JVD


Abdomen:  normal bowel sounds, soft, non tender


Genitourinary:  normal external genitalia


Extremities:  no edema, pedal pulses normal


Neurologic/Psychiatric:  abnormal gait - bedridden, alert


Musculoskeletal:  atrophy - BLE


Laboratory Tests


1/8/17 11:00: 


Sodium Level 144, Potassium Level 3.7, Chloride Level 104, Carbon Dioxide Level 

34H, Anion Gap 6, Blood Urea Nitrogen 10, Creatinine 0.5, Estimat Glomerular 

Filtration Rate , Glucose Level 108H, Calcium Level 9.1, Magnesium Level 1.8





Current Medications








 Medications


  (Trade)  Dose


 Ordered  Sig/Toribio


 Route


 PRN Reason  Start Time


 Stop Time Status Last Admin


Dose Admin


 


 Acetaminophen


  (Tylenol)  650 mg  Q4H  PRN


 RECTAL


 Fever/Headache/Mild Pain  1/1/17 21:30


 1/31/17 21:29   


 


 


 Acetaminophen/


 Hydrocodone Bitart


  (Norco 10/325)  1 ea  Q6H  PRN


 ORAL


 Pain Scale (6-10)  1/7/17 19:45


 1/14/17 23:59   


 


 


 Albuterol/


 Ipratropium


  (DuoNeb


 0.5-3(2.5)mg/3ml)  3 ml  Q4H  PRN


 HHN


 Shortness of Breath  1/6/17 13:45


 1/11/17 23:59   


 


 


 Clonidine HCl


  (Catapres)  0.1 mg  Q6H  PRN


 ORAL


 For High Blood Pressure  1/1/17 19:45


 1/31/17 19:44  1/7/17 18:49


 


 


 Dextrose


  (Dextrose 50%)    STAT  PRN


 IV


 Hypoglycemia  1/2/17 01:45


 2/1/17 01:44   


 


 


 Diphenhydramine


 HCl


  (Benadryl)  25 mg  Q6H  PRN


 ORAL


 Itching/Pruritis  1/1/17 19:45


 1/31/17 19:44   


 


 


 Docusate Sodium


  (Colace)  100 mg  DAILY


 ORAL


   1/2/17 09:00


 2/1/17 08:59  1/8/17 10:34


 


 


 Guaifenesin


  (Robitussin)  100 mg  Q4H  PRN


 ORAL


 For Cough  1/1/17 19:00


 1/31/17 18:59  1/7/17 19:44


 


 


 Lorazepam


  (Ativan 2mg/ml


 1ml)  1 mg  Q4H  PRN


 IV


 For Anxiety  1/7/17 17:45


 1/14/17 23:59  1/7/17 19:44


 


 


 Losartan Potassium


  (Cozaar)  50 mg  Q12HR


 ORAL


   1/1/17 21:00


 1/31/17 20:59  1/8/17 10:34


 


 


 Nystatin


  (Nystop Powder)  1 applic  THREE TIMES A  DAY


 TOPIC


   1/4/17 13:00


 2/3/17 12:59  1/8/17 10:34


 


 


 Pantoprazole


  (Protonix)  40 mg  Q12H  PRN


 ORAL


 Abdominal cramps  1/2/17 01:45


 2/1/17 01:44  1/7/17 09:15


 


 


 Warfarin Sodium


  (Coumadin per


 pharmacy)  1 ea  DAILY  PRN


 MISC


 Per rx protocol  1/2/17 09:00


 2/1/17 08:59   


 

















Rogerio ([facility])Erlinda NP Jan 8, 2017 12:37

## 2017-01-08 NOTE — CARDIOLOGY REPORT
--------------- APPROVED REPORT --------------





EXAM: Two-dimensional and M-mode echocardiogram with Doppler and color 

Doppler.



INDICATION

Congestive Heart Failure 



M-Mode DIMENSIONS 

IVSd1.7 (0.7-1.1cm)Left Atrium (MM)3.4 (1.6-4.0cm)

LVDd4.0 (3.5-5.6cm)Aortic Root2.7 (2.0-3.7cm)

PWd.7 (0.7-1.1cm)Aortic Cusp Exc.1.5 (1.5-2.0cm)



LVDs2.5 (2.5-4.0cm)

PWs1.3 cm





Normal left ventricular chamber size, systolic function and wall motion.

Left ventricular ejection fraction estimated to be 50-55%.

Moderate left ventricular hypertrophy.

All other cardiac chamber sizes are within normal limits.

Mild focal aortic valve sclerosis with adequate cusp excursion

Mildly thickened mitral valve leaflets with normal excursion.

Mild mitral annulus and aortic root calcification.

Pulmonic valve not well visualized.

Normal tricuspid valve structure.





A  color flow and spectral Doppler study was performed and revealed:

Mild aortic regurgitation.

Mild mitral regurgitation.

Mitral diastolic velocities suggest reduced left ventricular relaxation c/w mild LV 

diastolic dysfunction (Grade I).

Trace tricuspid regurgitation.

Tricuspid systolic velocities suggests peak right ventricular systolic pressure of 35 

mmHg.

## 2017-01-08 NOTE — INFECTIOUS DISEASES PROG NOTE
Assessment/Plan


Assessment/Plan


ASSESSMENT: 86-year-old female with:





CONS bacteremia ? contaminant ,  - repeat BCx NGTD, 


TTE EF 65%  no Veg 


Pneumonia  SCx NRF  Resp viral Panel : +ve for RSV


  Cxray 1/3 [facility] 


  Chest CT: Diffuse bilateral interstitial disease, possibly with micro-

nodularity, disseminated inflammatory lesions a possibility.


UTI , probable - UCx(-)


Fever - resolved, no leukocytosis








Acute BLE DVT SP IVC filter


CVA.


History of brain aneurysm.


Dementia.


NKDA


Full Code








PLAN:








Monitor pt off of AB Rx ( no indication of ribavirin at this point ) 


( SP  IV Rocephin  azithro, IV Vanco d# 7 / 7 )  


Chest x-ray.


Monitor CBC and BMP





Subjective


Constitutional:  Denies: anorexia, chills, drenching sweats, fatigue, fever, no 

symptoms, other


Allergies:  


Coded Allergies:  


     No Known Allergies (Unverified , 10/4/16)





Objective


Vital Signs





Last 24 Hour Vital Signs








  Date Time  Temp Pulse Resp B/P Pulse Ox O2 Delivery O2 Flow Rate FiO2


 


1/8/17 11:58 97.3 76 20 157/87 97 Room Air  


 


1/8/17 10:34    141/65    


 


1/8/17 08:29 97.5 61 20 141/65 97 Nasal Cannula  


 


1/8/17 08:00      Nasal Cannula 2.0 28


 


1/8/17 08:00     97 Nasal Cannula 2.0 28


 


1/8/17 08:00  61 18   Nasal Cannula 2.0 28


 


1/8/17 04:00 97.0 64 18 119/59 95 Nasal Cannula 2.0 


 


1/8/17 00:00 97.2 95 18 117/60 95 Nasal Cannula 2.0 


 


1/7/17 22:07  75 18   Nasal Cannula 2.0 28


 


1/7/17 22:07      Nasal Cannula 2.0 28


 


1/7/17 22:07     98 Nasal Cannula 2.0 28


 


1/7/17 21:06    148/92    


 


1/7/17 20:00 97.7  18 148/92 97 Nasal Cannula 2.0 


 


1/7/17 19:00 97.7 79 20 148/92 95 Nasal Cannula 2.0 


 


1/7/17 18:49    162/80    


 


1/7/17 15:51 97.2 75 20 174/65 97 Nasal Cannula 2.0 








Height (Feet):  5


Height (Inches):  6.00


Weight (Pounds):  140


HEENT:  anicteric


Respiratory/Chest:  normal breath sounds


Cardiovascular:  normal rate


Abdomen:  no organomegaly


Extremities:  no clubbing





Laboratory Tests








Test


  1/8/17


11:00


 


Sodium Level


  144 mEQ/L


(135-145)


 


Potassium Level


  3.7 mEQ/L


(3.4-4.9)


 


Chloride Level


  104 mEQ/L


()


 


Carbon Dioxide Level


  34 mEQ/L


(20-30)  H


 


Anion Gap 6 (5-15)  


 


Blood Urea Nitrogen


  10 mg/dL


(7-23)


 


Creatinine


  0.5 mg/dL


(0.5-0.9)


 


Estimat Glomerular Filtration


Rate  mL/min (>60)  


 


 


Glucose Level


  108 mg/dL


()  H


 


Calcium Level


  9.1 mg/dL


(8.6-10.2)


 


Magnesium Level


  1.8 mg/dL


(1.7-2.5)











Current Medications








 Medications


  (Trade)  Dose


 Ordered  Sig/Toribio


 Route


 PRN Reason  Start Time


 Stop Time Status Last Admin


Dose Admin


 


 Acetaminophen


  (Tylenol)  650 mg  Q4H  PRN


 RECTAL


 Fever/Headache/Mild Pain  1/1/17 21:30


 1/31/17 21:29   


 


 


 Acetaminophen/


 Hydrocodone Bitart


  (Norco 10/325)  1 ea  Q6H  PRN


 ORAL


 Pain Scale (6-10)  1/7/17 19:45


 1/14/17 23:59   


 


 


 Albuterol/


 Ipratropium


  (DuoNeb


 0.5-3(2.5)mg/3ml)  3 ml  Q4H  PRN


 HHN


 Shortness of Breath  1/6/17 13:45


 1/11/17 23:59   


 


 


 Clonidine HCl


  (Catapres)  0.1 mg  Q6H  PRN


 ORAL


 For High Blood Pressure  1/1/17 19:45


 1/31/17 19:44  1/7/17 18:49


 


 


 Dextrose


  (Dextrose 50%)    STAT  PRN


 IV


 Hypoglycemia  1/2/17 01:45


 2/1/17 01:44   


 


 


 Diphenhydramine


 HCl


  (Benadryl)  25 mg  Q6H  PRN


 ORAL


 Itching/Pruritis  1/1/17 19:45


 1/31/17 19:44   


 


 


 Docusate Sodium


  (Colace)  100 mg  DAILY


 ORAL


   1/2/17 09:00


 2/1/17 08:59  1/8/17 10:34


 


 


 Guaifenesin


  (Robitussin)  100 mg  Q4H  PRN


 ORAL


 For Cough  1/1/17 19:00


 1/31/17 18:59  1/7/17 19:44


 


 


 Lorazepam


  (Ativan 2mg/ml


 1ml)  1 mg  Q4H  PRN


 IV


 For Anxiety  1/7/17 17:45


 1/14/17 23:59  1/7/17 19:44


 


 


 Losartan Potassium


  (Cozaar)  50 mg  Q12HR


 ORAL


   1/1/17 21:00


 1/31/17 20:59  1/8/17 10:34


 


 


 Nystatin


  (Nystop Powder)  1 applic  THREE TIMES A  DAY


 TOPIC


   1/4/17 13:00


 2/3/17 12:59  1/8/17 10:34


 


 


 Pantoprazole


  (Protonix)  40 mg  Q12H  PRN


 ORAL


 Abdominal cramps  1/2/17 01:45


 2/1/17 01:44  1/7/17 09:15


 


 


 Warfarin Sodium


  (Coumadin per


 pharmacy)  1 ea  DAILY  PRN


 MISC


 Per rx protocol  1/2/17 09:00


 2/1/17 08:59   


 

















BRIANDA PAYNE M.D. Jan 8, 2017 12:07

## 2017-01-08 NOTE — NEPHROLOGY PROGRESS NOTE
Assessment/Plan


Problem List:  


(1) Gram positive bacterial infection


(2) Congestive heart failure (CHF)


(3) Respiratory distress


(4) HCAP (healthcare-associated pneumonia)


(5) Dyspnea


(6) Alzheimer's dementia


(7) Dysphagia


(8) CVA (cerebral vascular accident)


(9) HTN (hypertension)


Plan


cont abx per ID. 


cardio following. 


d/c plan home with home health in am once home O2 available.





Subjective


Subjective


appears comfortable. no acute events.





Objective


Objective





Last 24 Hour Vital Signs








  Date Time  Temp Pulse Resp B/P Pulse Ox O2 Delivery O2 Flow Rate FiO2


 


1/8/17 11:58 97.3 76 20 157/87 97 Room Air  


 


1/8/17 10:34    141/65    


 


1/8/17 08:29 97.5 61 20 141/65 97 Nasal Cannula  


 


1/8/17 08:00      Nasal Cannula 2.0 28


 


1/8/17 08:00     97 Nasal Cannula 2.0 28


 


1/8/17 08:00  61 18   Nasal Cannula 2.0 28


 


1/8/17 04:00 97.0 64 18 119/59 95 Nasal Cannula 2.0 


 


1/8/17 00:00 97.2 95 18 117/60 95 Nasal Cannula 2.0 


 


1/7/17 22:07  75 18   Nasal Cannula 2.0 28


 


1/7/17 22:07      Nasal Cannula 2.0 28


 


1/7/17 22:07     98 Nasal Cannula 2.0 28


 


1/7/17 21:06    148/92    


 


1/7/17 20:00 97.7  18 148/92 97 Nasal Cannula 2.0 


 


1/7/17 19:00 97.7 79 20 148/92 95 Nasal Cannula 2.0 


 


1/7/17 18:49    162/80    


 


1/7/17 15:51 97.2 75 20 174/65 97 Nasal Cannula 2.0 

















Intake and Output  


 


 1/7/17 1/8/17





 19:00 07:00


 


Intake Total 250 ml 120 ml


 


Output Total  550 ml


 


Balance 250 ml -430 ml


 


  


 


Intake Oral 250 ml 120 ml


 


Output Urine Total  550 ml


 


# Bowel Movements 2 1








Laboratory Tests


1/8/17 11:00: 


Sodium Level 144, Potassium Level 3.7, Chloride Level 104, Carbon Dioxide Level 

34H, Anion Gap 6, Blood Urea Nitrogen 10, Creatinine 0.5, Estimat Glomerular 

Filtration Rate , Glucose Level 108H, Calcium Level 9.1, Magnesium Level 1.8


1/8/17 13:15: 


White Blood Count 6.9, Red Blood Count 4.16L, Hemoglobin 12.2, Hematocrit 38.5, 

Mean Corpuscular Volume 93, Mean Corpuscular Hemoglobin 29.3, Mean Corpuscular 

Hemoglobin Concent 31.6L, Red Cell Distribution Width 13.3, Platelet Count 195, 

Mean Platelet Volume 6.8, Neutrophils (%) (Auto) 63.8, Lymphocytes (%) (Auto) 

22.5, Monocytes (%) (Auto) 9.2, Eosinophils (%) (Auto) 3.5H, Basophils (%) (Auto

) 1.0, Prothrombin Time [Pending], Prothromb Time International Ratio [Pending]


Height (Feet):  5


Height (Inches):  6.00


Weight (Pounds):  140


General Appearance:  no apparent distress


Cardiovascular:  normal rate, regular rhythm


Respiratory/Chest:  lungs clear


Abdomen:  non tender, soft











PARK ALY Jan 8, 2017 13:57

## 2017-01-08 NOTE — CARDIAC ELECTROPHYSIOLOGY PN
Assessment/Plan


Status Narrative


Echo 10/13/2016


M-mode measurements of left ventricle not obtainable due to cardiac position 


(angle)


Normal left ventricular chamber size, systolic function and wall motion to 

extent 


visualized.


Left ventricular ejection fraction estimated to be  65 %.


Moderate left ventricular hypertrophy by 2-D.


No evidence of pericardial effusion. 


All other cardiac chamber sizes are within normal limits. 


Focal aortic valve sclerosis with adequate cusp excursion.


Thickened mitral valve leaflets with normal excursion.


Mitral annulus and aortic root calcification.


Pulmonic valve not well visualized.


Normal tricuspid valve structure. 


IVC at normal size with physiologic collapse.





A  color flow and spectral Doppler study was performed and revealed:


Mild aortic regurgitation.


Mild mitral regurgitation.


Mitral diastolic velocities suggest reduced left ventricular relaxation c/w 

mild LV 


diastolic dysfunction (Grade I). 


Trace to mild tricuspid regurgitation.


Tricuspid  systolic velocities suggests peak right ventricular systolic 

pressure of  31  


mmHg.


Assessment/Plan


1. Shortness of breath and bilateral LE DVT. Ruled out for pulmonary embolism.  

Echo 10/2016 EF 65%. No MI.


2. Hypertension. On Cozaar 50 mg b.i.d.  


3. Pneumonia  


4. Bilateral LE DVT on Coumadin. S/P IVC filter.


5. Dysphagia.





DW RN





Subjective


Subjective


Alert in NAD with no new events overnight.RN at bedside.





Objective





Last 24 Hour Vital Signs








  Date Time  Temp Pulse Resp B/P Pulse Ox O2 Delivery O2 Flow Rate FiO2


 


1/8/17 11:58 97.3 76 20 157/87 97 Room Air  


 


1/8/17 10:34    141/65    


 


1/8/17 08:29 97.5 61 20 141/65 97 Nasal Cannula  


 


1/8/17 08:00      Nasal Cannula 2.0 28


 


1/8/17 08:00     97 Nasal Cannula 2.0 28


 


1/8/17 08:00  61 18   Nasal Cannula 2.0 28


 


1/8/17 04:00 97.0 64 18 119/59 95 Nasal Cannula 2.0 


 


1/8/17 00:00 97.2 95 18 117/60 95 Nasal Cannula 2.0 


 


1/7/17 22:07  75 18   Nasal Cannula 2.0 28


 


1/7/17 22:07      Nasal Cannula 2.0 28


 


1/7/17 22:07     98 Nasal Cannula 2.0 28


 


1/7/17 21:06    148/92    


 


1/7/17 20:00 97.7  18 148/92 97 Nasal Cannula 2.0 


 


1/7/17 19:00 97.7 79 20 148/92 95 Nasal Cannula 2.0 


 


1/7/17 18:49    162/80    


 


1/7/17 15:51 97.2 75 20 174/65 97 Nasal Cannula 2.0 

















Intake and Output  


 


 1/7/17 1/8/17





 19:00 07:00


 


Intake Total 250 ml 120 ml


 


Output Total  550 ml


 


Balance 250 ml -430 ml


 


  


 


Intake Oral 250 ml 120 ml


 


Output Urine Total  550 ml


 


# Bowel Movements 2 1











Laboratory Tests








Test


  1/8/17


11:00 1/8/17


13:15


 


Sodium Level


  144 mEQ/L


(135-145) 


 


 


Potassium Level


  3.7 mEQ/L


(3.4-4.9) 


 


 


Chloride Level


  104 mEQ/L


() 


 


 


Carbon Dioxide Level


  34 mEQ/L


(20-30)  H 


 


 


Anion Gap 6 (5-15)   


 


Blood Urea Nitrogen


  10 mg/dL


(7-23) 


 


 


Creatinine


  0.5 mg/dL


(0.5-0.9) 


 


 


Estimat Glomerular Filtration


Rate  mL/min (>60)  


  


 


 


Glucose Level


  108 mg/dL


()  H 


 


 


Calcium Level


  9.1 mg/dL


(8.6-10.2) 


 


 


Magnesium Level


  1.8 mg/dL


(1.7-2.5) 


 


 


White Blood Count


  


  6.9 K/UL


(4.8-10.8)


 


Red Blood Count


  


  4.16 M/UL


(4.20-5.40)  L


 


Hemoglobin


  


  12.2 G/DL


(12.0-16.0)


 


Hematocrit


  


  38.5 %


(37.0-47.0)


 


Mean Corpuscular Volume  93 FL (80-99)  


 


Mean Corpuscular Hemoglobin


  


  29.3 PG


(27.0-31.0)


 


Mean Corpuscular Hemoglobin


Concent 


  31.6 G/DL


(32.0-36.0)  L


 


Red Cell Distribution Width


  


  13.3 %


(11.6-14.8)


 


Platelet Count


  


  195 K/UL


(150-450)


 


Mean Platelet Volume


  


  6.8 FL


(6.5-10.1)


 


Neutrophils (%) (Auto)


  


  63.8 %


(45.0-75.0)


 


Lymphocytes (%) (Auto)


  


  22.5 %


(20.0-45.0)


 


Monocytes (%) (Auto)


  


  9.2 %


(1.0-10.0)


 


Eosinophils (%) (Auto)


  


  3.5 %


(0.0-3.0)  H


 


Basophils (%) (Auto)


  


  1.0 %


(0.0-2.0)


 


Prothrombin Time  Pending  


 


Prothromb Time International


Ratio 


  Pending  


 








Objective


HEAD AND NECK:  Shows no JVD.


LUNGS:  Coarse rhonchi.


CARDIOVASCULAR:  Regular S1 and S2 with no gallop or murmur.


ABDOMEN:  Soft.


EXTREMITIES:  No pitting edema.











MALCOLM ZULUAGA Jan 8, 2017 13:50

## 2017-01-08 NOTE — GENERAL PROGRESS NOTE
Assessment/Plan


Assessment/Plan


ASSESSMENT:


#. DVT of the bilateral lower extremities - s/p ivc permanent IVC filter and on 

coumadin, goal INR 2-3


#. Anemia potentially secondary to anemia of chronic disease - stable


#. Leukocytosis. Improved


#. Coagulopathy 2/2 coumadin


#. Trochanteric fracture of the right femur status post open reduction and 

internal fixation and repair


#. Sepsis


#. PNA





RECOMMENDATIONS:


1. Continue coumadin


2. Monitor for bleed with cbc


3. Goal INR 2-3 


4. Continue antibiotics prn basis


5. Transfuse if hemoglobin < 7.0


6. Continue nutritional support.


7. Followup pulm, cards, renal recs


8.  Staff





Thank you,





Lesley Sierra MD





Subjective


Constitutional:  Reports: no symptoms


HEENT:  Reports: no symptoms


Cardiovascular:  Reports: no symptoms


Respiratory:  Reports: no symptoms


Gastrointestinal/Abdominal:  Reports: poor appetite


Genitourinary:  Reports: no symptoms


Neurologic/Psychiatric:  Reports: no symptoms


Endocrine:  Reports: no symptoms


Hematologic/Lymphatic:  Reports: anemia


Allergies:  


Coded Allergies:  


     No Known Allergies (Unverified , 10/4/16)


Subjective


stable, not bleeding





Objective





Last 24 Hour Vital Signs








  Date Time  Temp Pulse Resp B/P Pulse Ox O2 Delivery O2 Flow Rate FiO2


 


1/8/17 04:00 97.0 64 18 119/59 95 Nasal Cannula 2.0 


 


1/8/17 00:00 97.2 95 18 117/60 95 Nasal Cannula 2.0 


 


1/7/17 22:07  75 18   Nasal Cannula 2.0 28


 


1/7/17 22:07      Nasal Cannula 2.0 28


 


1/7/17 22:07     98 Nasal Cannula 2.0 28


 


1/7/17 21:06    148/92    


 


1/7/17 20:00 97.7  18 148/92 97 Nasal Cannula 2.0 


 


1/7/17 19:00 97.7 79 20 148/92 95 Nasal Cannula 2.0 


 


1/7/17 18:49    162/80    


 


1/7/17 15:51 97.2 75 20 174/65 97 Nasal Cannula 2.0 


 


1/7/17 12:00 98.3  17 132/66  Nasal Cannula 2.0 


 


1/7/17 10:16 98.2       


 


1/7/17 09:15    126/84    


 


1/7/17 08:55 97.5  17 148/79 96 Nasal Cannula  

















Intake and Output  


 


 1/7/17 1/8/17





 19:00 07:00


 


Intake Total 250 ml 120 ml


 


Output Total  550 ml


 


Balance 250 ml -430 ml


 


  


 


Intake Oral 250 ml 120 ml


 


Output Urine Total  550 ml


 


# Bowel Movements 2 1








Height (Feet):  5


Height (Inches):  6.00


Weight (Pounds):  140


General Appearance:  no apparent distress


EENT:  TMs normal


Neck:  supple


Cardiovascular:  regular rhythm


Respiratory/Chest:  lungs clear


Abdomen:  non tender


Extremities:  non-tender


Edema:  no edema noted Leg (L), no edema noted Leg (R)


Neurologic:  alert


Skin:  warm/dry











LESLEY SIERRA Jan 8, 2017 07:10

## 2017-01-09 NOTE — PULMONOLOGY PROGRESS NOTE
Assessment/Plan


Problems:  


(1) HCAP (healthcare-associated pneumonia)


(2) Alzheimer's dementia


(3) HTN (hypertension)


(4) DVT (deep venous thrombosis)


(5) Gram positive bacterial infection


Assessment & Plan:  ? contaminant


Repeat Cx's neg


TTE without veg





(6) RSV (acute bronchiolitis due to respiratory syncytial virus)


Assessment/Plan


-Optimize pulmonary hygiene/mobilize as tolerated


-PRN O2


-PRN nebs


-Observe off Abx per ID


-Monitor volumes


-Has IVC filter, continue coumadin per heme-onc 


-Diet per SLP with STRICT aspiration precautions


-Full code





Subjective


Allergies:  


Coded Allergies:  


     No Known Allergies (Unverified , 10/4/16)


Subjective


Events reviewed


AFVSS, O2 needs stable


RSV +, off Abx


Jeffery diet





Objective





Last 24 Hour Vital Signs








  Date Time  Temp Pulse Resp B/P Pulse Ox O2 Delivery O2 Flow Rate FiO2


 


1/9/17 16:00 98.4 88 20 159/78 100 Nasal Cannula 2.0 


 


1/9/17 12:00 97.0 101 18 146/82 98 Room Air  


 


1/9/17 10:13    153/95    


 


1/9/17 08:00 98.1 112 22  97 Nasal Cannula 2.0 


 


1/9/17 07:42      Nasal Cannula 2.0 28


 


1/9/17 07:41     95 Nasal Cannula 2.0 28


 


1/9/17 07:40  90 18   Nasal Cannula 2.0 28


 


1/9/17 04:00 97.7 90 18 153/95 95 Nasal Cannula 2.0 


 


1/9/17 00:00 97.9 88 20 158/83 94 Nasal Cannula 2.0 


 


1/8/17 20:06    163/93    


 


1/8/17 19:30      Nasal Cannula 2.0 28


 


1/8/17 19:30     96 Nasal Cannula 2.0 28


 


1/8/17 19:30  66 18   Nasal Cannula 2.0 28

















Intake and Output  


 


 1/8/17 1/9/17





 19:00 07:00


 


Intake Total 240 ml 240 ml


 


Output Total 200 ml 1000 ml


 


Balance 40 ml -760 ml


 


  


 


Intake Oral 240 ml 240 ml


 


Output Urine Total 200 ml 1000 ml


 


# Bowel Movements 1 








General Appearance:  no acute distress, other - elderly demented


HEENT:  normocephalic, atraumatic, mucous membranes moist


Respiratory/Chest:  chest wall non-tender, lungs clear, normal breath sounds, 

no respiratory distress


Cardiovascular:  normal peripheral pulses, normal rate, regular rhythm


Abdomen:  normal bowel sounds, soft, non tender, no organomegaly, non distended

, no mass


Extremities:  no cyanosis, no clubbing, no edema


Laboratory Tests


1/9/17 06:35: 


Prothrombin Time 25.0H, Prothromb Time International Ratio 2.4H





Current Medications








 Medications


  (Trade)  Dose


 Ordered  Sig/Toribio


 Route


 PRN Reason  Start Time


 Stop Time Status Last Admin


Dose Admin


 


 Acetaminophen


  (Tylenol)  650 mg  Q4H  PRN


 RECTAL


 Fever/Headache/Mild Pain  1/1/17 21:30


 1/31/17 21:29   


 


 


 Acetaminophen/


 Hydrocodone Bitart


  (Norco 10/325)  1 ea  Q6H  PRN


 ORAL


 Pain Scale (6-10)  1/7/17 19:45


 1/14/17 23:59   


 


 


 Albuterol/


 Ipratropium


  (DuoNeb


 0.5-3(2.5)mg/3ml)  3 ml  Q4H  PRN


 HHN


 Shortness of Breath  1/6/17 13:45


 1/11/17 23:59   


 


 


 Clonidine HCl


  (Catapres)  0.1 mg  Q6H  PRN


 ORAL


 For High Blood Pressure  1/1/17 19:45


 1/31/17 19:44  1/7/17 18:49


 


 


 Dextrose


  (Dextrose 50%)    STAT  PRN


 IV


 Hypoglycemia  1/2/17 01:45


 2/1/17 01:44   


 


 


 Diphenhydramine


 HCl


  (Benadryl)  25 mg  Q6H  PRN


 ORAL


 Itching/Pruritis  1/1/17 19:45


 1/31/17 19:44   


 


 


 Docusate Sodium


  (Colace)  100 mg  DAILY


 ORAL


   1/2/17 09:00


 2/1/17 08:59  1/9/17 10:14


 


 


 Guaifenesin


  (Robitussin)  100 mg  Q4H  PRN


 ORAL


 For Cough  1/1/17 19:00


 1/31/17 18:59  1/7/17 19:44


 


 


 Lorazepam


  (Ativan 2mg/ml


 1ml)  1 mg  Q4H  PRN


 IV


 For Anxiety  1/7/17 17:45


 1/14/17 23:59  1/7/17 19:44


 


 


 Losartan Potassium


  (Cozaar)  50 mg  Q12HR


 ORAL


   1/1/17 21:00


 1/31/17 20:59  1/9/17 10:13


 


 


 Nystatin


  (Nystop Powder)  1 applic  THREE TIMES A  DAY


 TOPIC


   1/4/17 13:00


 2/3/17 12:59  1/9/17 18:06


 


 


 Pantoprazole


  (Protonix)  40 mg  Q12H  PRN


 ORAL


 Abdominal cramps  1/2/17 01:45


 2/1/17 01:44  1/7/17 09:15


 


 


 Warfarin Sodium


  (Coumadin per


 pharmacy)  1 ea  DAILY  PRN


 MISC


 Per rx protocol  1/2/17 09:00


 2/1/17 08:59   


 

















SEJAL DEJESUS M.D. Jan 9, 2017 19:11

## 2017-01-09 NOTE — INFECTIOUS DISEASES PROG NOTE
Assessment/Plan


Assessment/Plan





ASSESSMENT: 86-year-old female with:





Pneumonia - RVP(+) RSV


  Chest CT: Diffuse bilateral interstitial disease, possibly with micro-

nodularity, disseminated inflammatory lesions a possibility.


CONS bacteremia  - repeat BCx(-) 12/31, TTE(-) SBE


UTI , probable - UCx(-)


Fever - resolved, no leukocytosis








Acute BLE DVT SP IVC filter


CVA.


History of brain aneurysm.


Dementia.


NKDA


Full Code








PLAN:





monitor pt off of antimicrobials


  ( SP IV Rocephin, azithro, IV Vanco d# 7 / 7 )


monitor CBC, temperatures


monitor BMP


monitor CXR





Subjective


Allergies:  


Coded Allergies:  


     No Known Allergies (Unverified , 10/4/16)


Subjective





remains afebrile. comfortable





Objective


Vital Signs





Last 24 Hour Vital Signs








  Date Time  Temp Pulse Resp B/P Pulse Ox O2 Delivery O2 Flow Rate FiO2


 


1/9/17 12:00 97.0 101 18 146/82 98 Room Air  


 


1/9/17 10:13    153/95    


 


1/9/17 08:00 98.1 112 22  97 Nasal Cannula 2.0 


 


1/9/17 07:42      Nasal Cannula 2.0 28


 


1/9/17 07:41     95 Nasal Cannula 2.0 28


 


1/9/17 07:40  90 18   Nasal Cannula 2.0 28


 


1/9/17 04:00 97.7 90 18 153/95 95 Nasal Cannula 2.0 


 


1/9/17 00:00 97.9 88 20 158/83 94 Nasal Cannula 2.0 


 


1/8/17 20:06    163/93    


 


1/8/17 19:30      Nasal Cannula 2.0 28


 


1/8/17 19:30     96 Nasal Cannula 2.0 28


 


1/8/17 19:30  66 18   Nasal Cannula 2.0 28


 


1/8/17 19:00 98.6 100 20 163/93 93 Room Air  








Height (Feet):  5


Height (Inches):  6.00


Weight (Pounds):  140


General Appearance:  no acute distress


Respiratory/Chest:  no respiratory distress


Cardiovascular:  normal rate, regular rhythm


Abdomen:  normal bowel sounds, soft, non tender, non distended





Laboratory Tests








Test


  1/9/17


06:35


 


Prothrombin Time


  25.0 SEC


(9.30-11.50)  H


 


Prothromb Time International


Ratio 2.4 (0.9-1.1)


H











Current Medications








 Medications


  (Trade)  Dose


 Ordered  Sig/Toribio


 Route


 PRN Reason  Start Time


 Stop Time Status Last Admin


Dose Admin


 


 Acetaminophen


  (Tylenol)  650 mg  Q4H  PRN


 RECTAL


 Fever/Headache/Mild Pain  1/1/17 21:30


 1/31/17 21:29   


 


 


 Acetaminophen/


 Hydrocodone Bitart


  (Norco 10/325)  1 ea  Q6H  PRN


 ORAL


 Pain Scale (6-10)  1/7/17 19:45


 1/14/17 23:59   


 


 


 Albuterol/


 Ipratropium


  (DuoNeb


 0.5-3(2.5)mg/3ml)  3 ml  Q4H  PRN


 HHN


 Shortness of Breath  1/6/17 13:45


 1/11/17 23:59   


 


 


 Clonidine HCl


  (Catapres)  0.1 mg  Q6H  PRN


 ORAL


 For High Blood Pressure  1/1/17 19:45


 1/31/17 19:44  1/7/17 18:49


 


 


 Dextrose


  (Dextrose 50%)    STAT  PRN


 IV


 Hypoglycemia  1/2/17 01:45


 2/1/17 01:44   


 


 


 Diphenhydramine


 HCl


  (Benadryl)  25 mg  Q6H  PRN


 ORAL


 Itching/Pruritis  1/1/17 19:45


 1/31/17 19:44   


 


 


 Docusate Sodium


  (Colace)  100 mg  DAILY


 ORAL


   1/2/17 09:00


 2/1/17 08:59  1/9/17 10:14


 


 


 Guaifenesin


  (Robitussin)  100 mg  Q4H  PRN


 ORAL


 For Cough  1/1/17 19:00


 1/31/17 18:59  1/7/17 19:44


 


 


 Lorazepam


  (Ativan 2mg/ml


 1ml)  1 mg  Q4H  PRN


 IV


 For Anxiety  1/7/17 17:45


 1/14/17 23:59  1/7/17 19:44


 


 


 Losartan Potassium


  (Cozaar)  50 mg  Q12HR


 ORAL


   1/1/17 21:00


 1/31/17 20:59  1/9/17 10:13


 


 


 Nystatin


  (Nystop Powder)  1 applic  THREE TIMES A  DAY


 TOPIC


   1/4/17 13:00


 2/3/17 12:59  1/9/17 13:01


 


 


 Pantoprazole


  (Protonix)  40 mg  Q12H  PRN


 ORAL


 Abdominal cramps  1/2/17 01:45


 2/1/17 01:44  1/7/17 09:15


 


 


 Warfarin Sodium


  (Coumadin per


 pharmacy)  1 ea  DAILY  PRN


 MISC


 Per rx protocol  1/2/17 09:00


 2/1/17 08:59   


 

















CECY BREWER Jan 9, 2017 17:09

## 2017-01-09 NOTE — NEPHROLOGY PROGRESS NOTE
Assessment/Plan


Problem List:  


(1) Dyspnea


(2) Respiratory distress


(3) Congestive heart failure (CHF)


(4) HCAP (healthcare-associated pneumonia)


Plan





Cont abx per ID 


pulm hygiene. 


f/u recs per pulm and cardio.


Continue coumadin - inr goal 2-3





Subjective


ROS Limited/Unobtainable:  Yes


Subjective


In bed, in no distress, confused





Objective


Objective





Last 24 Hour Vital Signs








  Date Time  Temp Pulse Resp B/P Pulse Ox O2 Delivery O2 Flow Rate FiO2


 


1/9/17 12:00 97.0 101 18 146/82 98 Room Air  


 


1/9/17 10:13    153/95    


 


1/9/17 08:00 98.1 112 22  97 Nasal Cannula 2.0 


 


1/9/17 07:42      Nasal Cannula 2.0 28


 


1/9/17 07:41     95 Nasal Cannula 2.0 28


 


1/9/17 07:40  90 18   Nasal Cannula 2.0 28


 


1/9/17 04:00 97.7 90 18 153/95 95 Nasal Cannula 2.0 


 


1/9/17 00:00 97.9 88 20 158/83 94 Nasal Cannula 2.0 


 


1/8/17 20:06    163/93    


 


1/8/17 19:30      Nasal Cannula 2.0 28


 


1/8/17 19:30     96 Nasal Cannula 2.0 28


 


1/8/17 19:30  66 18   Nasal Cannula 2.0 28


 


1/8/17 19:00 98.6 100 20 163/93 93 Room Air  


 


1/8/17 15:51 96.8 71 20 154/81 92 Room Air  

















Intake and Output  


 


 1/8/17 1/9/17





 19:00 07:00


 


Intake Total 240 ml 240 ml


 


Output Total 200 ml 1000 ml


 


Balance 40 ml -760 ml


 


  


 


Intake Oral 240 ml 240 ml


 


Output Urine Total 200 ml 1000 ml


 


# Bowel Movements 1 








Laboratory Tests


1/9/17 06:35: 


Prothrombin Time 25.0H, Prothromb Time International Ratio 2.4H


Height (Feet):  5


Height (Inches):  6.00


Weight (Pounds):  140


General Appearance:  confused


EENT:  normal ENT inspection


Neck:  normal alignment


Cardiovascular:  normal peripheral pulses, regular rhythm


Respiratory/Chest:  normal breath sounds, no respiratory distress


Abdomen:  soft, no organomegaly


Extremities:  normal inspection, no calf tenderness


Neurologic:  disoriented











Egwu,Alisia JEREZ Jan 9, 2017 15:06

## 2017-01-09 NOTE — GENERAL PROGRESS NOTE
Assessment/Plan


Assessment/Plan


ASSESSMENT:


#. DVT of the bilateral lower extremities - s/p ivc permanent IVC filter and on 

coumadin, goal INR 2-3


#. Anemia potentially secondary to anemia of chronic disease - stable


#. Leukocytosis. Improved


#. Coagulopathy 2/2 coumadin


#. Trochanteric fracture of the right femur status post open reduction and 

internal fixation and repair


#. Sepsis


#. PNA





RECOMMENDATIONS:


1. Continue coumadin


2. Monitor for bleed with cbc


3. Goal INR 2-3 


4. Continue antibiotics prn basis


5. Transfuse if hemoglobin < 7


6. Continue nutritional support


7. Followup pulm, cards, renal recs


8.  Staff





Thank you,





Daniel Sierra MD





Subjective


Constitutional:  Reports: no symptoms


HEENT:  Reports: no symptoms


Cardiovascular:  Reports: no symptoms


Respiratory:  Reports: no symptoms


Gastrointestinal/Abdominal:  Reports: no symptoms


Genitourinary:  Reports: no symptoms


Neurologic/Psychiatric:  Reports: no symptoms


Endocrine:  Reports: no symptoms


Hematologic/Lymphatic:  Reports: anemia


Allergies:  


Coded Allergies:  


     No Known Allergies (Unverified , 10/4/16)


Subjective


stable, is not bleeding, tolerating coumadin





Objective





Last 24 Hour Vital Signs








  Date Time  Temp Pulse Resp B/P Pulse Ox O2 Delivery O2 Flow Rate FiO2


 


1/9/17 10:13    153/95    


 


1/9/17 08:00 98.1 112 22  97 Nasal Cannula 2.0 


 


1/9/17 07:42      Nasal Cannula 2.0 28


 


1/9/17 07:41     95 Nasal Cannula 2.0 28


 


1/9/17 07:40  90 18   Nasal Cannula 2.0 28


 


1/9/17 04:00 97.7 90 18 153/95 95 Nasal Cannula 2.0 


 


1/9/17 00:00 97.9 88 20 158/83 94 Nasal Cannula 2.0 


 


1/8/17 20:06    163/93    


 


1/8/17 19:30      Nasal Cannula 2.0 28


 


1/8/17 19:30     96 Nasal Cannula 2.0 28


 


1/8/17 19:30  66 18   Nasal Cannula 2.0 28


 


1/8/17 19:00 98.6 100 20 163/93 93 Room Air  


 


1/8/17 15:51 96.8 71 20 154/81 92 Room Air  

















Intake and Output  


 


 1/8/17 1/9/17





 19:00 07:00


 


Intake Total 240 ml 240 ml


 


Output Total 200 ml 1000 ml


 


Balance 40 ml -760 ml


 


  


 


Intake Oral 240 ml 240 ml


 


Output Urine Total 200 ml 1000 ml


 


# Bowel Movements 1 








Laboratory Tests


1/8/17 13:15: 


White Blood Count 6.9, Red Blood Count 4.16L, Hemoglobin 12.2, Hematocrit 38.5, 

Mean Corpuscular Volume 93, Mean Corpuscular Hemoglobin 29.3, Mean Corpuscular 

Hemoglobin Concent 31.6L, Red Cell Distribution Width 13.3, Platelet Count 195, 

Mean Platelet Volume 6.8, Neutrophils (%) (Auto) 63.8, Lymphocytes (%) (Auto) 

22.5, Monocytes (%) (Auto) 9.2, Eosinophils (%) (Auto) 3.5H, Basophils (%) (Auto

) 1.0, Prothrombin Time 29.6H, Prothromb Time International Ratio 2.8H


1/9/17 06:35: 


Prothrombin Time 25.0H, Prothromb Time International Ratio 2.4H


Height (Feet):  5


Height (Inches):  6.00


Weight (Pounds):  140


General Appearance:  WD/WN


EENT:  pharynx normal


Neck:  supple


Cardiovascular:  regular rhythm


Respiratory/Chest:  lungs clear


Abdomen:  soft


Extremities:  non-tender


Edema:  mild edema


Neurologic:  alert


Skin:  warm/dry











Daniel Sierra Jan 9, 2017 12:29

## 2017-01-09 NOTE — DIAGNOSTIC IMAGING REPORT
Indications: hip pain



Findings: Single view of the right hip obtained portably



Comparison: 11/18/16



Right hip prosthesis present. Study is done portably and of limited value. There 

is

fracture of the greater and lesser trochanter which was noted previously as well. 

No

gross change.



Impression:



No obvious new fracture. Study is limited.

## 2017-01-10 NOTE — CARDIAC ELECTROPHYSIOLOGY PN
Assessment/Plan


Status Narrative


Echo 10/13/2016


M-mode measurements of left ventricle not obtainable due to cardiac position 


(angle)


Normal left ventricular chamber size, systolic function and wall motion to 

extent 


visualized.


Left ventricular ejection fraction estimated to be  65 %.


Moderate left ventricular hypertrophy by 2-D.


No evidence of pericardial effusion. 


All other cardiac chamber sizes are within normal limits. 


Focal aortic valve sclerosis with adequate cusp excursion.


Thickened mitral valve leaflets with normal excursion.


Mitral annulus and aortic root calcification.


Pulmonic valve not well visualized.


Normal tricuspid valve structure. 


IVC at normal size with physiologic collapse.





A  color flow and spectral Doppler study was performed and revealed:


Mild aortic regurgitation.


Mild mitral regurgitation.


Mitral diastolic velocities suggest reduced left ventricular relaxation c/w 

mild LV 


diastolic dysfunction (Grade I). 


Trace to mild tricuspid regurgitation.


Tricuspid  systolic velocities suggests peak right ventricular systolic 

pressure of  31  


mmHg.


Assessment/Plan


1. Shortness of breath and bilateral LE DVT. Ruled out for pulmonary embolism. 

On Coumadin anyway for DVT. Echo 10/2016 EF 65%. No MI.


2. Hypertension. On Cozaar 50 mg b.i.d.  


3. Pneumonia  


4. Bilateral LE DVT on Coumadin. S/P IVC filter.


5. Dysphagia.





ROMERO RN





Subjective


Subjective


Awake in NAD with no new events.Sitter at bedside.





Objective





Last 24 Hour Vital Signs








  Date Time  Temp Pulse Resp B/P Pulse Ox O2 Delivery O2 Flow Rate FiO2


 


1/10/17 16:00 97.5 72 20 131/58 92 Room Air  


 


1/10/17 13:25      Room Air  21


 


1/10/17 13:25      Room Air  


 


1/10/17 12:00 97.0 78 20 148/73 98 Room Air  


 


1/10/17 10:16    162/92    


 


1/10/17 10:15    162/92    


 


1/10/17 08:00 98.7 83 20 161/90 98 Nasal Cannula 2.0 


 


1/10/17 07:58      Nasal Cannula 2.0 28


 


1/10/17 07:57     97 Nasal Cannula 2.0 28


 


1/10/17 07:56  93 18   Nasal Cannula 2.0 28


 


1/10/17 04:00 99.1 81 20 162/92 98 Nasal Cannula 2.0 


 


1/9/17 20:20    154/86    


 


1/9/17 19:58 99.1 104 20 154/86 98 Nasal Cannula 2.0 


 


1/9/17 19:30      Nasal Cannula 2.0 28


 


1/9/17 19:30  80 20   Nasal Cannula 2.0 28


 


1/9/17 19:30     94 Nasal Cannula 2.0 28

















Intake and Output  


 


 1/9/17 1/10/17





 19:00 07:00


 


Intake Total  300 ml


 


Output Total 175 ml 500 ml


 


Balance -175 ml -200 ml


 


  


 


Intake Oral  300 ml


 


Output Urine Total 175 ml 500 ml











Laboratory Tests








Test


  1/10/17


09:50


 


Prothrombin Time


  18.8 SEC


(9.30-11.50)  H


 


Prothromb Time International


Ratio 1.8 (0.9-1.1)


H








Objective


HEAD AND NECK:  Shows no JVD.


LUNGS:  Coarse rhonchi.


CARDIOVASCULAR:  Regular S1 and S2 with no gallop or murmur.


ABDOMEN:  Soft.


EXTREMITIES:  No pitting edema.











MALCOLM ZULUAGA Tom 10, 2017 18:29

## 2017-01-10 NOTE — INFECTIOUS DISEASES PROG NOTE
Assessment/Plan


Assessment/Plan





ASSESSMENT: 86-year-old female with:





Pneumonia - RVP(+) RSV


  Chest CT: Diffuse bilateral interstitial disease, possibly with micro-

nodularity, disseminated inflammatory lesions a possibility.


CONS bacteremia  - repeat BCx(-) 12/31, TTE(-) SBE  SP Rx


UTI , probable - UCx(-)  SP Rx


Fever - resolved, no leukocytosis








Acute BLE DVT SP IVC filter


CVA.


History of brain aneurysm.


Dementia.


NKDA


Full Code








PLAN:





ok to DC off of antimicrobials from ID standpoint


  ( SP IV Rocephin, azithro, IV Vanco d# 7 / 7 )


monitor CBC, temperatures


monitor BMP


monitor CXR





Subjective


Allergies:  


Coded Allergies:  


     No Known Allergies (Unverified , 10/4/16)


Subjective





remains afebrile. comfortable





Objective


Vital Signs





Last 24 Hour Vital Signs








  Date Time  Temp Pulse Resp B/P Pulse Ox O2 Delivery O2 Flow Rate FiO2


 


1/10/17 16:00 97.5 72 20 131/58 92 Room Air  


 


1/10/17 13:25      Room Air  21


 


1/10/17 13:25      Room Air  


 


1/10/17 12:00 97.0 78 20 148/73 98 Room Air  


 


1/10/17 10:16    162/92    


 


1/10/17 10:15    162/92    


 


1/10/17 08:00 98.7 83 20 161/90 98 Nasal Cannula 2.0 


 


1/10/17 07:58      Nasal Cannula 2.0 28


 


1/10/17 07:57     97 Nasal Cannula 2.0 28


 


1/10/17 07:56  93 18   Nasal Cannula 2.0 28


 


1/10/17 04:00 99.1 81 20 162/92 98 Nasal Cannula 2.0 


 


1/9/17 20:20    154/86    


 


1/9/17 19:58 99.1 104 20 154/86 98 Nasal Cannula 2.0 


 


1/9/17 19:30      Nasal Cannula 2.0 28


 


1/9/17 19:30  80 20   Nasal Cannula 2.0 28


 


1/9/17 19:30     94 Nasal Cannula 2.0 28








Height (Feet):  5


Height (Inches):  6.00


Weight (Pounds):  140


General Appearance:  no acute distress


Respiratory/Chest:  no respiratory distress


Cardiovascular:  normal rate, regular rhythm


Abdomen:  normal bowel sounds, soft, non tender, non distended





Laboratory Tests








Test


  1/10/17


09:50


 


Prothrombin Time


  18.8 SEC


(9.30-11.50)  H


 


Prothromb Time International


Ratio 1.8 (0.9-1.1)


H











Current Medications








 Medications


  (Trade)  Dose


 Ordered  Sig/Toribio


 Route


 PRN Reason  Start Time


 Stop Time Status Last Admin


Dose Admin


 


 Acetaminophen


  (Tylenol)  650 mg  Q4H  PRN


 RECTAL


 Fever/Headache/Mild Pain  1/1/17 21:30


 1/31/17 21:29   


 


 


 Acetaminophen/


 Hydrocodone Bitart


  (Norco 10/325)  1 ea  Q6H  PRN


 ORAL


 Pain Scale (6-10)  1/7/17 19:45


 1/14/17 23:59   


 


 


 Albuterol/


 Ipratropium


  (DuoNeb


 0.5-3(2.5)mg/3ml)  3 ml  Q4H  PRN


 HHN


 Shortness of Breath  1/6/17 13:45


 1/11/17 23:59   


 


 


 Albuterol/


 Ipratropium


  (DuoNeb


 0.5-3(2.5)mg/3ml)  3 ml  Q6HRT


 HHN


   1/10/17 13:00


 1/15/17 12:59   


 


 


 Clonidine HCl


  (Catapres)  0.1 mg  Q6H  PRN


 ORAL


 For High Blood Pressure  1/1/17 19:45


 1/31/17 19:44  1/10/17 10:16


 


 


 Dextrose


  (Dextrose 50%)    STAT  PRN


 IV


 Hypoglycemia  1/2/17 01:45


 2/1/17 01:44   


 


 


 Diphenhydramine


 HCl


  (Benadryl)  25 mg  Q6H  PRN


 ORAL


 Itching/Pruritis  1/1/17 19:45


 1/31/17 19:44  1/9/17 20:20


 


 


 Docusate Sodium


  (Colace)  100 mg  DAILY


 ORAL


   1/2/17 09:00


 2/1/17 08:59  1/10/17 10:16


 


 


 Guaifenesin


  (Mucinex)  600 mg  TWICE A  DAY


 ORAL


   1/10/17 11:00


 2/9/17 10:59  1/10/17 13:31


 


 


 Guaifenesin


  (Robitussin)  100 mg  Q4H  PRN


 ORAL


 For Cough  1/1/17 19:00


 1/31/17 18:59  1/7/17 19:44


 


 


 Lorazepam


  (Ativan 2mg/ml


 1ml)  1 mg  Q4H  PRN


 IV


 For Anxiety  1/7/17 17:45


 1/14/17 23:59  1/10/17 10:59


 


 


 Losartan Potassium


  (Cozaar)  50 mg  Q12HR


 ORAL


   1/1/17 21:00


 1/31/17 20:59  1/10/17 10:15


 


 


 Nystatin


  (Nystop Powder)  1 applic  THREE TIMES A  DAY


 TOPIC


   1/4/17 13:00


 2/3/17 12:59  1/10/17 13:34


 


 


 Pantoprazole


  (Protonix)  40 mg  Q12H  PRN


 ORAL


 Abdominal cramps  1/2/17 01:45


 2/1/17 01:44  1/7/17 09:15


 


 


 Warfarin Sodium


  (Coumadin per


 pharmacy)  1 ea  DAILY  PRN


 MISC


 Per rx protocol  1/2/17 09:00


 2/1/17 08:59   


 


 


 Warfarin Sodium


  (Coumadin)  4 mg  COUMADIN


 PO


   1/10/17 17:00


 1/10/17 17:01   


 

















CECY BREWER Tom 10, 2017 16:49

## 2017-01-10 NOTE — GENERAL PROGRESS NOTE
Assessment/Plan


Assessment/Plan


ASSESSMENT:


#. DVT of the bilateral lower extremities - s/p ivc permanent IVC filter and on 

coumadin, goal INR 2-3


#. Anemia potentially secondary to anemia of chronic disease - stable


#. Leukocytosis. Improved


#. Coagulopathy 2/2 coumadin


#. Trochanteric fracture of the right femur status post open reduction and 

internal fixation and repair


#. Sepsis


#. PNA





RECOMMENDATIONS:


1. Continue coumadin


2. Monitor for bleed with cbc


3. Goal INR 2-3 


4. Continue antibiotics prn basis


5. Transfuse if hgb < 7


6. Continue nutritional support


7. Followup pulm, cards, renal recs


8.  Staff





Thank you,





Daniel Sierra MD





Subjective


Constitutional:  Reports: no symptoms


HEENT:  Reports: no symptoms


Cardiovascular:  Reports: no symptoms


Respiratory:  Reports: no symptoms


Gastrointestinal/Abdominal:  Reports: poor appetite


Genitourinary:  Reports: no symptoms


Neurologic/Psychiatric:  Reports: no symptoms


Endocrine:  Reports: no symptoms


Hematologic/Lymphatic:  Reports: anemia


Allergies:  


Coded Allergies:  


     No Known Allergies (Unverified , 10/4/16)


Subjective


stable, not bleeding, tolerating coumadin





Objective





Last 24 Hour Vital Signs








  Date Time  Temp Pulse Resp B/P Pulse Ox O2 Delivery O2 Flow Rate FiO2


 


1/10/17 16:00 97.5 72 20 131/58 92 Room Air  


 


1/10/17 13:25      Room Air  21


 


1/10/17 13:25      Room Air  


 


1/10/17 12:00 97.0 78 20 148/73 98 Room Air  


 


1/10/17 10:16    162/92    


 


1/10/17 10:15    162/92    


 


1/10/17 08:00 98.7 83 20 161/90 98 Nasal Cannula 2.0 


 


1/10/17 07:58      Nasal Cannula 2.0 28


 


1/10/17 07:57     97 Nasal Cannula 2.0 28


 


1/10/17 07:56  93 18   Nasal Cannula 2.0 28


 


1/10/17 04:00 99.1 81 20 162/92 98 Nasal Cannula 2.0 


 


1/9/17 20:20    154/86    


 


1/9/17 19:58 99.1 104 20 154/86 98 Nasal Cannula 2.0 


 


1/9/17 19:30      Nasal Cannula 2.0 28


 


1/9/17 19:30  80 20   Nasal Cannula 2.0 28


 


1/9/17 19:30     94 Nasal Cannula 2.0 28

















Intake and Output  


 


 1/9/17 1/10/17





 19:00 07:00


 


Intake Total  300 ml


 


Output Total 175 ml 500 ml


 


Balance -175 ml -200 ml


 


  


 


Intake Oral  300 ml


 


Output Urine Total 175 ml 500 ml








Laboratory Tests


1/10/17 09:50: 


Prothrombin Time 18.8H, Prothromb Time International Ratio 1.8H


Height (Feet):  5


Height (Inches):  6.00


Weight (Pounds):  140


General Appearance:  alert


EENT:  normal ENT inspection


Neck:  normal inspection


Cardiovascular:  regular rhythm


Respiratory/Chest:  no respiratory distress


Genitourinary/Rectal:  heme negative stool


Extremities:  no calf tenderness


Edema:  1+ Leg (L), 1+ Leg (R)


Edema:  mild edema


Neurologic:  alert


Skin:  normal pigmentation











Daniel Sierra Tom 10, 2017 16:54

## 2017-01-10 NOTE — PULMONOLOGY PROGRESS NOTE
Assessment/Plan


Problems:  


(1) HCAP (healthcare-associated pneumonia)


(2) Alzheimer's dementia


(3) HTN (hypertension)


(4) DVT (deep venous thrombosis)


(5) Gram positive bacterial infection


Assessment & Plan:  ? contaminant


Repeat Cx's neg


TTE without veg





(6) RSV (acute bronchiolitis due to respiratory syncytial virus)


Assessment/Plan


-Optimize pulmonary hygiene/mobilize as tolerated


-PRN O2


-RTC and PRN DUOnebs\


-Mucinex and PRN Robitussijn


-Observe off Abx per ID


-Check repeat CXR


-PT/OT, OOB 


-IS


-Monitor volumes


-Has IVC filter, continue coumadin per heme-onc 


-Diet per SLP with STRICT aspiration precautions


-Full code





Subjective


Allergies:  


Coded Allergies:  


     No Known Allergies (Unverified , 10/4/16)


Subjective


AFVSS, O2 needs stable


Occasionally coughing, not mobilizing much


Jeffery diet





Objective





Last 24 Hour Vital Signs








  Date Time  Temp Pulse Resp B/P Pulse Ox O2 Delivery O2 Flow Rate FiO2


 


1/10/17 07:58      Nasal Cannula 2.0 28


 


1/10/17 07:57     97 Nasal Cannula 2.0 28


 


1/10/17 07:56  93 18   Nasal Cannula 2.0 28


 


1/10/17 04:00 99.1 81 20 162/92 98 Nasal Cannula 2.0 


 


1/9/17 20:20    154/86    


 


1/9/17 19:58 99.1 104 20 154/86 98 Nasal Cannula 2.0 


 


1/9/17 19:30      Nasal Cannula 2.0 28


 


1/9/17 19:30  80 20   Nasal Cannula 2.0 28


 


1/9/17 19:30     94 Nasal Cannula 2.0 28


 


1/9/17 16:00 98.4 88 20 159/78 100 Nasal Cannula 2.0 


 


1/9/17 12:00 97.0 101 18 146/82 98 Room Air  


 


1/9/17 10:13    153/95    

















Intake and Output  


 


 1/9/17 1/10/17





 19:00 07:00


 


Intake Total  300 ml


 


Output Total 175 ml 500 ml


 


Balance -175 ml -200 ml


 


  


 


Intake Oral  300 ml


 


Output Urine Total 175 ml 500 ml








General Appearance:  no acute distress, cachetic, other - frail elderly


HEENT:  normocephalic, atraumatic, mucous membranes moist


Respiratory/Chest:  rhonchi - scattered clear with coughing


Cardiovascular:  normal peripheral pulses, normal rate, regular rhythm


Abdomen:  normal bowel sounds, soft, non tender, no organomegaly, non distended

, no mass


Extremities:  no cyanosis, no clubbing, no edema





Current Medications








 Medications


  (Trade)  Dose


 Ordered  Sig/Toribio


 Route


 PRN Reason  Start Time


 Stop Time Status Last Admin


Dose Admin


 


 Acetaminophen


  (Tylenol)  650 mg  Q4H  PRN


 RECTAL


 Fever/Headache/Mild Pain  1/1/17 21:30


 1/31/17 21:29   


 


 


 Acetaminophen/


 Hydrocodone Bitart


  (Norco 10/325)  1 ea  Q6H  PRN


 ORAL


 Pain Scale (6-10)  1/7/17 19:45


 1/14/17 23:59   


 


 


 Albuterol/


 Ipratropium


  (DuoNeb


 0.5-3(2.5)mg/3ml)  3 ml  Q4H  PRN


 HHN


 Shortness of Breath  1/6/17 13:45


 1/11/17 23:59   


 


 


 Clonidine HCl


  (Catapres)  0.1 mg  Q6H  PRN


 ORAL


 For High Blood Pressure  1/1/17 19:45


 1/31/17 19:44  1/7/17 18:49


 


 


 Dextrose


  (Dextrose 50%)    STAT  PRN


 IV


 Hypoglycemia  1/2/17 01:45


 2/1/17 01:44   


 


 


 Diphenhydramine


 HCl


  (Benadryl)  25 mg  Q6H  PRN


 ORAL


 Itching/Pruritis  1/1/17 19:45


 1/31/17 19:44  1/9/17 20:20


 


 


 Docusate Sodium


  (Colace)  100 mg  DAILY


 ORAL


   1/2/17 09:00


 2/1/17 08:59  1/9/17 10:14


 


 


 Guaifenesin


  (Robitussin)  100 mg  Q4H  PRN


 ORAL


 For Cough  1/1/17 19:00


 1/31/17 18:59  1/7/17 19:44


 


 


 Lorazepam


  (Ativan 2mg/ml


 1ml)  1 mg  Q4H  PRN


 IV


 For Anxiety  1/7/17 17:45


 1/14/17 23:59  1/7/17 19:44


 


 


 Losartan Potassium


  (Cozaar)  50 mg  Q12HR


 ORAL


   1/1/17 21:00


 1/31/17 20:59  1/9/17 20:20


 


 


 Nystatin


  (Nystop Powder)  1 applic  THREE TIMES A  DAY


 TOPIC


   1/4/17 13:00


 2/3/17 12:59  1/9/17 18:06


 


 


 Pantoprazole


  (Protonix)  40 mg  Q12H  PRN


 ORAL


 Abdominal cramps  1/2/17 01:45


 2/1/17 01:44  1/7/17 09:15


 


 


 Warfarin Sodium


  (Coumadin per


 pharmacy)  1 ea  DAILY  PRN


 MISC


 Per rx protocol  1/2/17 09:00


 2/1/17 08:59   


 

















SEJAL DEJESUS M.D. Tom 10, 2017 09:42

## 2017-01-10 NOTE — NEPHROLOGY PROGRESS NOTE
Assessment/Plan


Problem List:  


(1) Hypokalemia


Assessment:  corrected.





(2) DVT (deep venous thrombosis)


(3) HTN (hypertension)


(4) Alzheimer's dementia


(5) Dysphagia


(6) Depression


(7) CVA (cerebral vascular accident)


(8) Brain aneurysm


(9) HCAP (healthcare-associated pneumonia)


(10) Dyspnea


(11) Congestive heart failure (CHF)


(12) Gram positive bacterial infection


Plan


abx per ID. 


pulm hygiene. 


f/u recs per pulm and cardio. 


d/c plan home with home O2.





Subjective


Subjective


appears comfortable. no acute events.





Objective


Objective





Last 24 Hour Vital Signs








  Date Time  Temp Pulse Resp B/P Pulse Ox O2 Delivery O2 Flow Rate FiO2


 


1/10/17 10:16    162/92    


 


1/10/17 10:15    162/92    


 


1/10/17 07:58      Nasal Cannula 2.0 28


 


1/10/17 07:57     97 Nasal Cannula 2.0 28


 


1/10/17 07:56  93 18   Nasal Cannula 2.0 28


 


1/10/17 04:00 99.1 81 20 162/92 98 Nasal Cannula 2.0 


 


1/9/17 20:20    154/86    


 


1/9/17 19:58 99.1 104 20 154/86 98 Nasal Cannula 2.0 


 


1/9/17 19:30      Nasal Cannula 2.0 28


 


1/9/17 19:30  80 20   Nasal Cannula 2.0 28


 


1/9/17 19:30     94 Nasal Cannula 2.0 28


 


1/9/17 16:00 98.4 88 20 159/78 100 Nasal Cannula 2.0 

















Intake and Output  


 


 1/9/17 1/10/17





 19:00 07:00


 


Intake Total  300 ml


 


Output Total 175 ml 500 ml


 


Balance -175 ml -200 ml


 


  


 


Intake Oral  300 ml


 


Output Urine Total 175 ml 500 ml








Laboratory Tests


1/10/17 09:50: 


Prothrombin Time 18.8H, Prothromb Time International Ratio 1.8H


Height (Feet):  5


Height (Inches):  6.00


Weight (Pounds):  140


General Appearance:  no apparent distress


Cardiovascular:  normal rate, regular rhythm


Respiratory/Chest:  lungs clear


Abdomen:  non tender, soft











KIMBERLY JERNIGAN Tom 10, 2017 12:17

## 2017-01-11 NOTE — NEPHROLOGY PROGRESS NOTE
Assessment/Plan


Problem List:  


(1) Dyspnea


(2) Respiratory distress


(3) Congestive heart failure (CHF)


(4) HCAP (healthcare-associated pneumonia)


(5) DVT (deep venous thrombosis)


(6) Alzheimer's dementia


Plan


pulm hygiene. 


f/u recs per pulm and cardio. 


Continue coumadin per hem- Goal INR 2-3 


Continue nutritional support


DC plan - home with oxygen





Subjective


ROS Limited/Unobtainable:  Yes


Subjective


In bed, in no distress, having lunch





Objective


Objective





Last 24 Hour Vital Signs








  Date Time  Temp Pulse Resp B/P Pulse Ox O2 Delivery O2 Flow Rate FiO2


 


1/11/17 07:46 97.2 89 22 137/98 98 Nasal Cannula 2.0 


 


1/11/17 07:33  80 18  100 Nasal Cannula 2.0 


 


1/11/17 07:26      Nasal Cannula 2.0 28


 


1/11/17 07:26     98 Nasal Cannula 2.0 28


 


1/11/17 07:26  73 18  98 Nasal Cannula 2.0 


 


1/11/17 04:00 98.8 110 17 132/107 94 Nasal Cannula 2.0 


 


1/11/17 00:31  74 18  98 Nasal Cannula 2.0 


 


1/11/17 00:31  72 18  98 Nasal Cannula 2.0 


 


1/10/17 23:55 98.2 70 20 128/68 98 Room Air  


 


1/10/17 21:55    151/87    


 


1/10/17 20:00 97.7 90 18 151/87 96 Nasal Cannula 2.0 


 


1/10/17 19:42  76 18  98 Nasal Cannula 2.0 


 


1/10/17 19:41  76 18  98 Nasal Cannula 2.0 


 


1/10/17 19:41      Nasal Cannula 2.0 28


 


1/10/17 19:40     96 Nasal Cannula 2.0 28


 


1/10/17 16:00 97.5 72 20 131/58 92 Room Air  


 


1/10/17 13:25      Room Air  21


 


1/10/17 13:25      Room Air  

















Intake and Output  


 


 1/10/17 1/11/17





 19:00 07:00


 


Intake Total 120 ml 180 ml


 


Output Total  360 ml


 


Balance 120 ml -180 ml


 


  


 


Intake Oral 120 ml 180 ml


 


Output Urine Total  360 ml


 


# Bowel Movements  1








Laboratory Tests


1/11/17 05:40: 


White Blood Count 7.6, Red Blood Count 3.91L, Hemoglobin 11.3L, Hematocrit 37.5

, Mean Corpuscular Volume 96, Mean Corpuscular Hemoglobin 29.0, Mean 

Corpuscular Hemoglobin Concent 30.3L, Red Cell Distribution Width 13.4, 

Platelet Count 172, Mean Platelet Volume 7.7, Neutrophils (%) (Auto) 56.8, 

Lymphocytes (%) (Auto) 25.0, Monocytes (%) (Auto) 10.5H, Eosinophils (%) (Auto) 

6.6H, Basophils (%) (Auto) 1.1, Prothrombin Time 18.2H, Prothromb Time 

International Ratio 1.8H, Sodium Level 144, Potassium Level 3.7, Chloride Level 

103, Carbon Dioxide Level 32H, Anion Gap 9, Blood Urea Nitrogen 11, Creatinine 

0.6, Estimat Glomerular Filtration Rate , Glucose Level 91, Calcium Level 9.1


Height (Feet):  5


Height (Inches):  6.00


Weight (Pounds):  140


General Appearance:  no apparent distress, confused


EENT:  normal ENT inspection


Neck:  normal alignment, supple, normal inspection


Cardiovascular:  normal rate, regular rhythm


Respiratory/Chest:  normal breath sounds, no respiratory distress


Abdomen:  normal bowel sounds, non tender, soft


Extremities:  normal range of motion


Neurologic:  alert, oriented x 3, responsive, normal mood/affect











Alisia Oakes N.P. Jan 11, 2017 12:25

## 2017-01-11 NOTE — CARDIAC ELECTROPHYSIOLOGY PN
Assessment/Plan


Status Narrative


Echo 10/13/2016


M-mode measurements of left ventricle not obtainable due to cardiac position 


(angle)


Normal left ventricular chamber size, systolic function and wall motion to 

extent 


visualized.


Left ventricular ejection fraction estimated to be  65 %.


Moderate left ventricular hypertrophy by 2-D.


No evidence of pericardial effusion. 


All other cardiac chamber sizes are within normal limits. 


Focal aortic valve sclerosis with adequate cusp excursion.


Thickened mitral valve leaflets with normal excursion.


Mitral annulus and aortic root calcification.


Pulmonic valve not well visualized.


Normal tricuspid valve structure. 


IVC at normal size with physiologic collapse.





A  color flow and spectral Doppler study was performed and revealed:


Mild aortic regurgitation.


Mild mitral regurgitation.


Mitral diastolic velocities suggest reduced left ventricular relaxation c/w 

mild LV 


diastolic dysfunction (Grade I). 


Trace to mild tricuspid regurgitation.


Tricuspid  systolic velocities suggests peak right ventricular systolic 

pressure of  31  


mmHg.


Assessment/Plan


1. Shortness of breath and bilateral LE DVT. No pulmonary embolism. On Coumadin 

for DVT. Echo 10/2016 EF 65%. No MI.


2. Hypertension. On Cozaar 50 mg b.i.d.  


3. Pneumonia  


4. Bilateral LE DVT on Coumadin. S/P IVC filter.


5. Dysphagia.





ROMERO RN





Subjective


Subjective


Awake in NAD with no new events.Sitter at bedside.At times get agitated. Wants 

to wear a face mask!





Objective





Last 24 Hour Vital Signs








  Date Time  Temp Pulse Resp B/P Pulse Ox O2 Delivery O2 Flow Rate FiO2


 


1/11/17 07:46 97.2 89 22 137/98 98 Nasal Cannula 2.0 


 


1/11/17 07:33  80 18  100 Nasal Cannula 2.0 


 


1/11/17 07:26      Nasal Cannula 2.0 28


 


1/11/17 07:26     98 Nasal Cannula 2.0 28


 


1/11/17 07:26  73 18  98 Nasal Cannula 2.0 


 


1/11/17 04:00 98.8 110 17 132/107 94 Nasal Cannula 2.0 


 


1/11/17 00:31  74 18  98 Nasal Cannula 2.0 


 


1/11/17 00:31  72 18  98 Nasal Cannula 2.0 


 


1/10/17 23:55 98.2 70 20 128/68 98 Room Air  


 


1/10/17 21:55    151/87    


 


1/10/17 20:00 97.7 90 18 151/87 96 Nasal Cannula 2.0 


 


1/10/17 19:42  76 18  98 Nasal Cannula 2.0 


 


1/10/17 19:41  76 18  98 Nasal Cannula 2.0 


 


1/10/17 19:41      Nasal Cannula 2.0 28


 


1/10/17 19:40     96 Nasal Cannula 2.0 28


 


1/10/17 16:00 97.5 72 20 131/58 92 Room Air  


 


1/10/17 13:25      Room Air  21


 


1/10/17 13:25      Room Air  


 


1/10/17 12:00 97.0 78 20 148/73 98 Room Air  

















Intake and Output  


 


 1/10/17 1/11/17





 19:00 07:00


 


Intake Total 120 ml 180 ml


 


Output Total  360 ml


 


Balance 120 ml -180 ml


 


  


 


Intake Oral 120 ml 180 ml


 


Output Urine Total  360 ml


 


# Bowel Movements  1











Laboratory Tests








Test


  1/11/17


05:40


 


White Blood Count


  7.6 K/UL


(4.8-10.8)


 


Red Blood Count


  3.91 M/UL


(4.20-5.40)  L


 


Hemoglobin


  11.3 G/DL


(12.0-16.0)  L


 


Hematocrit


  37.5 %


(37.0-47.0)


 


Mean Corpuscular Volume 96 FL (80-99)  


 


Mean Corpuscular Hemoglobin


  29.0 PG


(27.0-31.0)


 


Mean Corpuscular Hemoglobin


Concent 30.3 G/DL


(32.0-36.0)  L


 


Red Cell Distribution Width


  13.4 %


(11.6-14.8)


 


Platelet Count


  172 K/UL


(150-450)


 


Mean Platelet Volume


  7.7 FL


(6.5-10.1)


 


Neutrophils (%) (Auto)


  56.8 %


(45.0-75.0)


 


Lymphocytes (%) (Auto)


  25.0 %


(20.0-45.0)


 


Monocytes (%) (Auto)


  10.5 %


(1.0-10.0)  H


 


Eosinophils (%) (Auto)


  6.6 %


(0.0-3.0)  H


 


Basophils (%) (Auto)


  1.1 %


(0.0-2.0)


 


Prothrombin Time


  18.2 SEC


(9.30-11.50)  H


 


Prothromb Time International


Ratio 1.8 (0.9-1.1)


H


 


Sodium Level


  144 mEQ/L


(135-145)


 


Potassium Level


  3.7 mEQ/L


(3.4-4.9)


 


Chloride Level


  103 mEQ/L


()


 


Carbon Dioxide Level


  32 mEQ/L


(20-30)  H


 


Anion Gap 9 (5-15)  


 


Blood Urea Nitrogen


  11 mg/dL


(7-23)


 


Creatinine


  0.6 mg/dL


(0.5-0.9)


 


Estimat Glomerular Filtration


Rate  mL/min (>60)  


 


 


Glucose Level


  91 mg/dL


()


 


Calcium Level


  9.1 mg/dL


(8.6-10.2)








Objective


HEAD AND NECK:  Shows no JVD.


LUNGS:  Clear


CARDIOVASCULAR:  Regular S1 and S2 with no gallop or murmur.


ABDOMEN:  Soft.


EXTREMITIES:  No pitting edema.











MALCOLM ZULUAGA Jan 11, 2017 10:19

## 2017-01-11 NOTE — GENERAL PROGRESS NOTE
Assessment/Plan


Assessment/Plan


ASSESSMENT:


#. DVT of the bilateral lower extremities - s/p ivc permanent IVC filter and on 

coumadin, goal INR 2-3


#. Anemia potentially secondary to anemia of chronic disease - stable


#. Leukocytosis. Improved


#. Coagulopathy 2/2 coumadin


#. Trochanteric fracture of the right femur status post open reduction and 

internal fixation and repair


#. Sepsis


#. PNA





RECOMMENDATIONS:


1. Continue coumadin


2. Monitor for bleed with cbc


3. Goal INR 2-3 


4. Continue antibiotics prn basis


5. Transfuse if hgb < 7


6. Continue nutritional support


7. Followup pulm, cards, renal recs


8.  Staff





Thank you,





Daniel Sierra MD





Subjective


Constitutional:  Reports: no symptoms


HEENT:  Reports: no symptoms


Cardiovascular:  Reports: no symptoms


Respiratory:  Reports: no symptoms


Gastrointestinal/Abdominal:  Reports: poor appetite


Genitourinary:  Reports: no symptoms


Neurologic/Psychiatric:  Reports: no symptoms


Endocrine:  Reports: no symptoms


Hematologic/Lymphatic:  Reports: anemia


Allergies:  


Coded Allergies:  


     No Known Allergies (Unverified , 10/4/16)


Subjective


stable, not bleeding, is tolerating coumadin





Objective





Last 24 Hour Vital Signs








  Date Time  Temp Pulse Resp B/P Pulse Ox O2 Delivery O2 Flow Rate FiO2


 


1/11/17 16:00 97.9 99 20 156/84 95 Room Air  


 


1/11/17 15:05    124/78    


 


1/11/17 12:00 97.7 88 19 154/79 99 Nasal Cannula 2.0 


 


1/11/17 07:46 97.2 89 22 137/98 98 Nasal Cannula 2.0 


 


1/11/17 07:33  80 18  100 Nasal Cannula 2.0 


 


1/11/17 07:26      Nasal Cannula 2.0 28


 


1/11/17 07:26     98 Nasal Cannula 2.0 28


 


1/11/17 07:26  73 18  98 Nasal Cannula 2.0 


 


1/11/17 04:00 98.8 110 17 132/107 94 Nasal Cannula 2.0 


 


1/11/17 00:31  74 18  98 Nasal Cannula 2.0 


 


1/11/17 00:31  72 18  98 Nasal Cannula 2.0 


 


1/10/17 23:55 98.2 70 20 128/68 98 Room Air  


 


1/10/17 21:55    151/87    


 


1/10/17 20:00 97.7 90 18 151/87 96 Nasal Cannula 2.0 


 


1/10/17 19:42  76 18  98 Nasal Cannula 2.0 


 


1/10/17 19:41  76 18  98 Nasal Cannula 2.0 


 


1/10/17 19:41      Nasal Cannula 2.0 28


 


1/10/17 19:40     96 Nasal Cannula 2.0 28

















Intake and Output  


 


 1/10/17 1/11/17





 19:00 07:00


 


Intake Total 120 ml 180 ml


 


Output Total  360 ml


 


Balance 120 ml -180 ml


 


  


 


Intake Oral 120 ml 180 ml


 


Output Urine Total  360 ml


 


# Bowel Movements  1








Laboratory Tests


1/11/17 05:40: 


White Blood Count 7.6, Red Blood Count 3.91L, Hemoglobin 11.3L, Hematocrit 37.5

, Mean Corpuscular Volume 96, Mean Corpuscular Hemoglobin 29.0, Mean 

Corpuscular Hemoglobin Concent 30.3L, Red Cell Distribution Width 13.4, 

Platelet Count 172, Mean Platelet Volume 7.7, Neutrophils (%) (Auto) 56.8, 

Lymphocytes (%) (Auto) 25.0, Monocytes (%) (Auto) 10.5H, Eosinophils (%) (Auto) 

6.6H, Basophils (%) (Auto) 1.1, Prothrombin Time 18.2H, Prothromb Time 

International Ratio 1.8H, Sodium Level 144, Potassium Level 3.7, Chloride Level 

103, Carbon Dioxide Level 32H, Anion Gap 9, Blood Urea Nitrogen 11, Creatinine 

0.6, Estimat Glomerular Filtration Rate , Glucose Level 91, Calcium Level 9.1


Height (Feet):  5


Height (Inches):  6.00


Weight (Pounds):  140


General Appearance:  no apparent distress


EENT:  TMs normal


Neck:  supple


Cardiovascular:  regular rhythm


Respiratory/Chest:  normal breath sounds


Abdomen:  non tender


Extremities:  non-tender


Edema:  no edema noted Leg (L), no edema noted Leg (R)


Edema:  mild edema


Neurologic:  alert


Skin:  warm/dry











Daniel Sierra Jan 11, 2017 16:56

## 2017-01-11 NOTE — INFECTIOUS DISEASES PROG NOTE
Assessment/Plan


Assessment/Plan





ASSESSMENT: 86-year-old female with:





Pneumonia - RVP(+) RSV


   CXR 1/10: Lungs are essentially clear


  Chest CT: Diffuse bilateral interstitial disease, possibly with micro-

nodularity, disseminated inflammatory lesions a possibility.


CONS bacteremia  - repeat BCx(-) 12/31, TTE(-) SBE  SP Rx


UTI , probable - UCx(-)  SP Rx


Fever - resolved, no leukocytosis








Acute BLE DVT SP IVC filter


CVA.


History of brain aneurysm.


Dementia.


NKDA


Full Code








PLAN:





ok to DC off of antimicrobials from ID standpoint


  ( SP IV Rocephin, azithro, IV Vanco d# 7 / 7 )


monitor CBC, temperatures


monitor BMP


monitor CXR





Subjective


Allergies:  


Coded Allergies:  


     No Known Allergies (Unverified , 10/4/16)


Subjective





remains afebrile. comfortable





Objective


Vital Signs





Last 24 Hour Vital Signs








  Date Time  Temp Pulse Resp B/P Pulse Ox O2 Delivery O2 Flow Rate FiO2


 


1/11/17 15:05    124/78    


 


1/11/17 12:00 97.7 88 19 154/79 99 Nasal Cannula 2.0 


 


1/11/17 07:46 97.2 89 22 137/98 98 Nasal Cannula 2.0 


 


1/11/17 07:33  80 18  100 Nasal Cannula 2.0 


 


1/11/17 07:26      Nasal Cannula 2.0 28


 


1/11/17 07:26     98 Nasal Cannula 2.0 28


 


1/11/17 07:26  73 18  98 Nasal Cannula 2.0 


 


1/11/17 04:00 98.8 110 17 132/107 94 Nasal Cannula 2.0 


 


1/11/17 00:31  74 18  98 Nasal Cannula 2.0 


 


1/11/17 00:31  72 18  98 Nasal Cannula 2.0 


 


1/10/17 23:55 98.2 70 20 128/68 98 Room Air  


 


1/10/17 21:55    151/87    


 


1/10/17 20:00 97.7 90 18 151/87 96 Nasal Cannula 2.0 


 


1/10/17 19:42  76 18  98 Nasal Cannula 2.0 


 


1/10/17 19:41  76 18  98 Nasal Cannula 2.0 


 


1/10/17 19:41      Nasal Cannula 2.0 28


 


1/10/17 19:40     96 Nasal Cannula 2.0 28


 


1/10/17 16:00 97.5 72 20 131/58 92 Room Air  








Height (Feet):  5


Height (Inches):  6.00


Weight (Pounds):  140


General Appearance:  no acute distress


Respiratory/Chest:  no respiratory distress


Cardiovascular:  normal rate, regular rhythm


Abdomen:  normal bowel sounds, soft, non tender, non distended





Laboratory Tests








Test


  1/11/17


05:40


 


White Blood Count


  7.6 K/UL


(4.8-10.8)


 


Red Blood Count


  3.91 M/UL


(4.20-5.40)  L


 


Hemoglobin


  11.3 G/DL


(12.0-16.0)  L


 


Hematocrit


  37.5 %


(37.0-47.0)


 


Mean Corpuscular Volume 96 FL (80-99)  


 


Mean Corpuscular Hemoglobin


  29.0 PG


(27.0-31.0)


 


Mean Corpuscular Hemoglobin


Concent 30.3 G/DL


(32.0-36.0)  L


 


Red Cell Distribution Width


  13.4 %


(11.6-14.8)


 


Platelet Count


  172 K/UL


(150-450)


 


Mean Platelet Volume


  7.7 FL


(6.5-10.1)


 


Neutrophils (%) (Auto)


  56.8 %


(45.0-75.0)


 


Lymphocytes (%) (Auto)


  25.0 %


(20.0-45.0)


 


Monocytes (%) (Auto)


  10.5 %


(1.0-10.0)  H


 


Eosinophils (%) (Auto)


  6.6 %


(0.0-3.0)  H


 


Basophils (%) (Auto)


  1.1 %


(0.0-2.0)


 


Prothrombin Time


  18.2 SEC


(9.30-11.50)  H


 


Prothromb Time International


Ratio 1.8 (0.9-1.1)


H


 


Sodium Level


  144 mEQ/L


(135-145)


 


Potassium Level


  3.7 mEQ/L


(3.4-4.9)


 


Chloride Level


  103 mEQ/L


()


 


Carbon Dioxide Level


  32 mEQ/L


(20-30)  H


 


Anion Gap 9 (5-15)  


 


Blood Urea Nitrogen


  11 mg/dL


(7-23)


 


Creatinine


  0.6 mg/dL


(0.5-0.9)


 


Estimat Glomerular Filtration


Rate  mL/min (>60)  


 


 


Glucose Level


  91 mg/dL


()


 


Calcium Level


  9.1 mg/dL


(8.6-10.2)











Current Medications








 Medications


  (Trade)  Dose


 Ordered  Sig/Toribio


 Route


 PRN Reason  Start Time


 Stop Time Status Last Admin


Dose Admin


 


 Acetaminophen


  (Tylenol)  650 mg  Q4H  PRN


 RECTAL


 Fever/Headache/Mild Pain  1/1/17 21:30


 1/31/17 21:29   


 


 


 Acetaminophen/


 Hydrocodone Bitart


  (Norco 10/325)  1 ea  Q6H  PRN


 ORAL


 Pain Scale (6-10)  1/7/17 19:45


 1/14/17 23:59   


 


 


 Albuterol/


 Ipratropium


  (DuoNeb


 0.5-3(2.5)mg/3ml)  3 ml  Q4H  PRN


 HHN


 Shortness of Breath  1/6/17 13:45


 1/11/17 23:59   


 


 


 Albuterol/


 Ipratropium


  (DuoNeb


 0.5-3(2.5)mg/3ml)  3 ml  Q6HRT


 HHN


   1/10/17 13:00


 1/15/17 12:59  1/11/17 13:36


 


 


 Clonidine HCl


  (Catapres)  0.1 mg  Q6H  PRN


 ORAL


 For High Blood Pressure  1/1/17 19:45


 1/31/17 19:44  1/10/17 10:16


 


 


 Dextrose


  (Dextrose 50%)    STAT  PRN


 IV


 Hypoglycemia  1/2/17 01:45


 2/1/17 01:44   


 


 


 Diphenhydramine


 HCl


  (Benadryl)  25 mg  Q6H  PRN


 ORAL


 Itching/Pruritis  1/1/17 19:45


 1/31/17 19:44  1/9/17 20:20


 


 


 Docusate Sodium


  (Colace)  100 mg  DAILY


 ORAL


   1/2/17 09:00


 2/1/17 08:59  1/11/17 15:04


 


 


 Guaifenesin


  (Mucinex)  600 mg  TWICE A  DAY


 ORAL


   1/10/17 11:00


 2/9/17 10:59  1/10/17 17:59


 


 


 Guaifenesin


  (Robitussin)  100 mg  Q4H  PRN


 ORAL


 For Cough  1/1/17 19:00


 1/31/17 18:59  1/7/17 19:44


 


 


 Lorazepam


  (Ativan 2mg/ml


 1ml)  1 mg  Q4H  PRN


 IV


 For Anxiety  1/7/17 17:45


 1/14/17 23:59  1/10/17 10:59


 


 


 Losartan Potassium


  (Cozaar)  50 mg  Q12HR


 ORAL


   1/1/17 21:00


 1/31/17 20:59  1/11/17 15:05


 


 


 Nystatin


  (Nystop Powder)  1 applic  THREE TIMES A  DAY


 TOPIC


   1/4/17 13:00


 2/3/17 12:59  1/11/17 15:48


 


 


 Pantoprazole


  (Protonix)  40 mg  Q12H  PRN


 ORAL


 Abdominal cramps  1/2/17 01:45


 2/1/17 01:44  1/7/17 09:15


 


 


 Warfarin Sodium


  (Coumadin per


 pharmacy)  1 ea  DAILY  PRN


 MISC


 Per rx protocol  1/2/17 09:00


 2/1/17 08:59   


 

















CECY BREWER Jan 11, 2017 15:57

## 2017-01-11 NOTE — PULMONOLOGY PROGRESS NOTE
Assessment/Plan


Problems:  


(1) HCAP (healthcare-associated pneumonia)


(2) Alzheimer's dementia


(3) HTN (hypertension)


(4) DVT (deep venous thrombosis)


(5) Gram positive bacterial infection


Assessment & Plan:  ? contaminant


Repeat Cx's neg


TTE without veg





(6) RSV (acute bronchiolitis due to respiratory syncytial virus)


Assessment/Plan


-Optimize pulmonary hygiene/mobilize as tolerated


-PRN O2


-RTC and PRN DUOnebs


-Mucinex and PRN Robitussijn


-Observe off Abx per ID


-Need to repeat CT chest in 2 weeks to ensure resolution of GGO's


-PT/OT, OOB 


-IS


-Monitor volumes


-Has IVC filter, continue coumadin per heme-onc 


-Diet per SLP with STRICT aspiration precautions


-Full code





Subjective


Allergies:  


Coded Allergies:  


     No Known Allergies (Unverified , 10/4/16)


Subjective


AFVSS, O2 needs stable


No cough, no SOB


Jeffery diet





Objective





Last 24 Hour Vital Signs








  Date Time  Temp Pulse Resp B/P Pulse Ox O2 Delivery O2 Flow Rate FiO2


 


1/11/17 12:00 97.7 88 19 154/79 99 Nasal Cannula 2.0 


 


1/11/17 07:46 97.2 89 22 137/98 98 Nasal Cannula 2.0 


 


1/11/17 07:33  80 18  100 Nasal Cannula 2.0 


 


1/11/17 07:26      Nasal Cannula 2.0 28


 


1/11/17 07:26     98 Nasal Cannula 2.0 28


 


1/11/17 07:26  73 18  98 Nasal Cannula 2.0 


 


1/11/17 04:00 98.8 110 17 132/107 94 Nasal Cannula 2.0 


 


1/11/17 00:31  74 18  98 Nasal Cannula 2.0 


 


1/11/17 00:31  72 18  98 Nasal Cannula 2.0 


 


1/10/17 23:55 98.2 70 20 128/68 98 Room Air  


 


1/10/17 21:55    151/87    


 


1/10/17 20:00 97.7 90 18 151/87 96 Nasal Cannula 2.0 


 


1/10/17 19:42  76 18  98 Nasal Cannula 2.0 


 


1/10/17 19:41  76 18  98 Nasal Cannula 2.0 


 


1/10/17 19:41      Nasal Cannula 2.0 28


 


1/10/17 19:40     96 Nasal Cannula 2.0 28


 


1/10/17 16:00 97.5 72 20 131/58 92 Room Air  

















Intake and Output  


 


 1/10/17 1/11/17





 19:00 07:00


 


Intake Total 120 ml 180 ml


 


Output Total  360 ml


 


Balance 120 ml -180 ml


 


  


 


Intake Oral 120 ml 180 ml


 


Output Urine Total  360 ml


 


# Bowel Movements  1








General Appearance:  no acute distress, cachetic, other - frail confused 

elderly female


HEENT:  normocephalic, atraumatic, mucous membranes moist


Respiratory/Chest:  chest wall non-tender, lungs clear, normal breath sounds, 

no respiratory distress


Cardiovascular:  normal peripheral pulses, normal rate, regular rhythm


Abdomen:  normal bowel sounds, soft, non tender, no organomegaly, non distended


Extremities:  no cyanosis, no clubbing, no edema


Laboratory Tests


1/11/17 05:40: 


White Blood Count 7.6, Red Blood Count 3.91L, Hemoglobin 11.3L, Hematocrit 37.5

, Mean Corpuscular Volume 96, Mean Corpuscular Hemoglobin 29.0, Mean 

Corpuscular Hemoglobin Concent 30.3L, Red Cell Distribution Width 13.4, 

Platelet Count 172, Mean Platelet Volume 7.7, Neutrophils (%) (Auto) 56.8, 

Lymphocytes (%) (Auto) 25.0, Monocytes (%) (Auto) 10.5H, Eosinophils (%) (Auto) 

6.6H, Basophils (%) (Auto) 1.1, Prothrombin Time 18.2H, Prothromb Time 

International Ratio 1.8H, Sodium Level 144, Potassium Level 3.7, Chloride Level 

103, Carbon Dioxide Level 32H, Anion Gap 9, Blood Urea Nitrogen 11, Creatinine 

0.6, Estimat Glomerular Filtration Rate , Glucose Level 91, Calcium Level 9.1





Current Medications








 Medications


  (Trade)  Dose


 Ordered  Sig/Toribio


 Route


 PRN Reason  Start Time


 Stop Time Status Last Admin


Dose Admin


 


 Acetaminophen


  (Tylenol)  650 mg  Q4H  PRN


 RECTAL


 Fever/Headache/Mild Pain  1/1/17 21:30


 1/31/17 21:29   


 


 


 Acetaminophen/


 Hydrocodone Bitart


  (Norco 10/325)  1 ea  Q6H  PRN


 ORAL


 Pain Scale (6-10)  1/7/17 19:45


 1/14/17 23:59   


 


 


 Albuterol/


 Ipratropium


  (DuoNeb


 0.5-3(2.5)mg/3ml)  3 ml  Q4H  PRN


 HHN


 Shortness of Breath  1/6/17 13:45


 1/11/17 23:59   


 


 


 Albuterol/


 Ipratropium


  (DuoNeb


 0.5-3(2.5)mg/3ml)  3 ml  Q6HRT


 HHN


   1/10/17 13:00


 1/15/17 12:59  1/11/17 13:36


 


 


 Clonidine HCl


  (Catapres)  0.1 mg  Q6H  PRN


 ORAL


 For High Blood Pressure  1/1/17 19:45


 1/31/17 19:44  1/10/17 10:16


 


 


 Dextrose


  (Dextrose 50%)    STAT  PRN


 IV


 Hypoglycemia  1/2/17 01:45


 2/1/17 01:44   


 


 


 Diphenhydramine


 HCl


  (Benadryl)  25 mg  Q6H  PRN


 ORAL


 Itching/Pruritis  1/1/17 19:45


 1/31/17 19:44  1/9/17 20:20


 


 


 Docusate Sodium


  (Colace)  100 mg  DAILY


 ORAL


   1/2/17 09:00


 2/1/17 08:59  1/10/17 10:16


 


 


 Guaifenesin


  (Mucinex)  600 mg  TWICE A  DAY


 ORAL


   1/10/17 11:00


 2/9/17 10:59  1/10/17 17:59


 


 


 Guaifenesin


  (Robitussin)  100 mg  Q4H  PRN


 ORAL


 For Cough  1/1/17 19:00


 1/31/17 18:59  1/7/17 19:44


 


 


 Lorazepam


  (Ativan 2mg/ml


 1ml)  1 mg  Q4H  PRN


 IV


 For Anxiety  1/7/17 17:45


 1/14/17 23:59  1/10/17 10:59


 


 


 Losartan Potassium


  (Cozaar)  50 mg  Q12HR


 ORAL


   1/1/17 21:00


 1/31/17 20:59  1/10/17 21:55


 


 


 Nystatin


  (Nystop Powder)  1 applic  THREE TIMES A  DAY


 TOPIC


   1/4/17 13:00


 2/3/17 12:59  1/10/17 17:59


 


 


 Pantoprazole


  (Protonix)  40 mg  Q12H  PRN


 ORAL


 Abdominal cramps  1/2/17 01:45


 2/1/17 01:44  1/7/17 09:15


 


 


 Warfarin Sodium


  (Coumadin per


 pharmacy)  1 ea  DAILY  PRN


 MISC


 Per rx protocol  1/2/17 09:00


 2/1/17 08:59   


 

















SEJAL DEJESUS M.D. Jan 11, 2017 13:48

## 2017-01-11 NOTE — DIAGNOSTIC IMAGING REPORT
Indication: Dyspnea



Comparison:  1/7/17



A single view chest radiograph was obtained.



Findings:



Cardiomegaly is stable. Aorta is also mildly enlarged. Lungs are essentially clear.

Bones are osteopenic.



Impression:



No acute disease

## 2017-01-12 NOTE — CARDIAC ELECTROPHYSIOLOGY PN
Assessment/Plan


Status Narrative


Echo 10/13/2016


M-mode measurements of left ventricle not obtainable due to cardiac position 


(angle)


Normal left ventricular chamber size, systolic function and wall motion to 

extent 


visualized.


Left ventricular ejection fraction estimated to be  65 %.


Moderate left ventricular hypertrophy by 2-D.


No evidence of pericardial effusion. 


All other cardiac chamber sizes are within normal limits. 


Focal aortic valve sclerosis with adequate cusp excursion.


Thickened mitral valve leaflets with normal excursion.


Mitral annulus and aortic root calcification.


Pulmonic valve not well visualized.


Normal tricuspid valve structure. 


IVC at normal size with physiologic collapse.





A  color flow and spectral Doppler study was performed and revealed:


Mild aortic regurgitation.


Mild mitral regurgitation.


Mitral diastolic velocities suggest reduced left ventricular relaxation c/w 

mild LV 


diastolic dysfunction (Grade I). 


Trace to mild tricuspid regurgitation.


Tricuspid  systolic velocities suggests peak right ventricular systolic 

pressure of  31  


mmHg.


Assessment/Plan


1. Shortness of breath. Echo 10/2016 EF 65%. No MI.Resolved.


2. Bilateral LE DVT without pulmonary embolism. On Coumadin, S/P IVC filter.


3. Hypertension. On Cozaar 50 mg b.i.d.  


4. Pneumonia  


5. Dysphagia.





ROMERO RN





Subjective


Subjective


Awake in NAD with no new events.Sitter at bedside.





Objective





Last 24 Hour Vital Signs








  Date Time  Temp Pulse Resp B/P Pulse Ox O2 Delivery O2 Flow Rate FiO2


 


1/12/17 13:14  75 18  99 Nasal Cannula 2.0 


 


1/12/17 13:03  74 18  99 Nasal Cannula 2.0 28


 


1/12/17 09:43    138/77    


 


1/12/17 08:00 97.5 88 18 114/61 95 Nasal Cannula 2.0 


 


1/12/17 07:22  82 18  98 Nasal Cannula 2.0 28


 


1/12/17 07:15     97 Nasal Cannula 2.0 28


 


1/12/17 07:12      Nasal Cannula 2.0 28


 


1/12/17 06:50  82 18  97 Nasal Cannula 2.0 


 


1/12/17 04:00 97.7 89 24 116/61 97 Room Air 2.0 


 


1/12/17 01:18  103 18  98 Nasal Cannula 2.0 28


 


1/12/17 01:08  101 18  97 Nasal Cannula 2.0 


 


1/12/17 00:00 97.7 113 20 158/100 95 Room Air 2.0 


 


1/11/17 21:30    130/77    


 


1/11/17 20:00 98.2 99 20 150/75 98 Nasal Cannula 2.0 


 


1/11/17 20:00  96 18  99 Nasal Cannula 2.0 28


 


1/11/17 19:52      Nasal Cannula 2.0 28


 


1/11/17 19:52  98 18  97 Nasal Cannula 2.0 


 


1/11/17 19:51     97 Nasal Cannula 2.0 28

















Intake and Output  


 


 1/11/17 1/12/17





 19:00 07:00


 


Intake Total 200 ml 


 


Output Total 300 ml 750 ml


 


Balance -100 ml -750 ml


 


  


 


Intake Oral 200 ml 


 


Output Urine Total 300 ml 750 ml


 


# Bowel Movements 1 











Laboratory Tests








Test


  1/12/17


06:35


 


White Blood Count


  8.0 K/UL


(4.8-10.8)


 


Red Blood Count


  3.67 M/UL


(4.20-5.40)  L


 


Hemoglobin


  10.8 G/DL


(12.0-16.0)  L


 


Hematocrit


  33.6 %


(37.0-47.0)  L


 


Mean Corpuscular Volume 92 FL (80-99)  


 


Mean Corpuscular Hemoglobin


  29.5 PG


(27.0-31.0)


 


Mean Corpuscular Hemoglobin


Concent 32.2 G/DL


(32.0-36.0)


 


Red Cell Distribution Width


  13.4 %


(11.6-14.8)


 


Platelet Count


  164 K/UL


(150-450)


 


Mean Platelet Volume


  8.4 FL


(6.5-10.1)


 


Neutrophils (%) (Auto)


  57.6 %


(45.0-75.0)


 


Lymphocytes (%) (Auto)


  25.5 %


(20.0-45.0)


 


Monocytes (%) (Auto)


  11.0 %


(1.0-10.0)  H


 


Eosinophils (%) (Auto)


  5.0 %


(0.0-3.0)  H


 


Basophils (%) (Auto)


  0.9 %


(0.0-2.0)


 


Sodium Level


  147 mEQ/L


(135-145)  H


 


Potassium Level


  3.1 mEQ/L


(3.4-4.9)  L


 


Chloride Level


  104 mEQ/L


()


 


Carbon Dioxide Level


  32 mEQ/L


(20-30)  H


 


Anion Gap 11 (5-15)  


 


Blood Urea Nitrogen


  11 mg/dL


(7-23)


 


Creatinine


  0.6 mg/dL


(0.5-0.9)


 


Estimat Glomerular Filtration


Rate  mL/min (>60)  


 


 


Glucose Level


  103 mg/dL


()


 


Calcium Level


  9.2 mg/dL


(8.6-10.2)








Objective


HEAD AND NECK:  Shows no JVD.


LUNGS:  Clear


CARDIOVASCULAR:  Regular S1 and S2 with no gallop or murmur.


ABDOMEN:  Soft.


EXTREMITIES:  No pitting edema.











MALCOLM ZULUAGA Jan 12, 2017 18:27

## 2017-01-12 NOTE — PULMONOLOGY PROGRESS NOTE
Assessment/Plan


Problems:  


(1) HCAP (healthcare-associated pneumonia)


(2) Alzheimer's dementia


(3) HTN (hypertension)


(4) DVT (deep venous thrombosis)


(5) Gram positive bacterial infection


Assessment & Plan:  ? contaminant


Repeat Cx's neg


TTE without veg





(6) RSV (acute bronchiolitis due to respiratory syncytial virus)


Assessment/Plan


-Optimize pulmonary hygiene/mobilize as tolerated


-PRN O2


-TID and PRN DUOnebs


-Mucinex and PRN Robitussijn


-Observe off Abx per ID


-Need to repeat CT chest in 2 weeks to ensure resolution of GGO's


-PT/OT, OOB 


-IS


-Monitor volumes


-Has IVC filter, continue coumadin per heme-onc 


-Diet per SLP with STRICT aspiration precautions


-Full code





Subjective


Allergies:  


Coded Allergies:  


     No Known Allergies (Unverified , 10/4/16)


Subjective


AFVSS, O2 needs stable


No cough, no SOB


Jeffery diet





Objective





Last 24 Hour Vital Signs








  Date Time  Temp Pulse Resp B/P Pulse Ox O2 Delivery O2 Flow Rate FiO2


 


1/12/17 13:14  75 18  99 Nasal Cannula 2.0 


 


1/12/17 13:03  74 18  99 Nasal Cannula 2.0 28


 


1/12/17 09:43    138/77    


 


1/12/17 08:00 97.5 88 18 114/61 95 Nasal Cannula 2.0 


 


1/12/17 07:22  82 18  98 Nasal Cannula 2.0 28


 


1/12/17 07:15     97 Nasal Cannula 2.0 28


 


1/12/17 07:12      Nasal Cannula 2.0 28


 


1/12/17 06:50  82 18  97 Nasal Cannula 2.0 


 


1/12/17 04:00 97.7 89 24 116/61 97 Room Air 2.0 


 


1/12/17 01:18  103 18  98 Nasal Cannula 2.0 28


 


1/12/17 01:08  101 18  97 Nasal Cannula 2.0 


 


1/12/17 00:00 97.7 113 20 158/100 95 Room Air 2.0 


 


1/11/17 21:30    130/77    


 


1/11/17 20:00 98.2 99 20 150/75 98 Nasal Cannula 2.0 


 


1/11/17 20:00  96 18  99 Nasal Cannula 2.0 28


 


1/11/17 19:52      Nasal Cannula 2.0 28


 


1/11/17 19:52  98 18  97 Nasal Cannula 2.0 


 


1/11/17 19:51     97 Nasal Cannula 2.0 28

















Intake and Output  


 


 1/11/17 1/12/17





 19:00 07:00


 


Intake Total 200 ml 


 


Output Total 300 ml 750 ml


 


Balance -100 ml -750 ml


 


  


 


Intake Oral 200 ml 


 


Output Urine Total 300 ml 750 ml


 


# Bowel Movements 1 








General Appearance:  no acute distress, cachetic, other - frail elderly


HEENT:  normocephalic, atraumatic, anicteric, mucous membranes moist


Respiratory/Chest:  chest wall non-tender, lungs clear, normal breath sounds, 

no respiratory distress, no accessory muscle use


Cardiovascular:  normal peripheral pulses, normal rate, regular rhythm


Abdomen:  normal bowel sounds, soft, non tender, no organomegaly, non distended


Extremities:  no cyanosis, no clubbing, no edema


Laboratory Tests


1/12/17 06:35: 


White Blood Count 8.0, Red Blood Count 3.67L, Hemoglobin 10.8L, Hematocrit 33.6L

, Mean Corpuscular Volume 92, Mean Corpuscular Hemoglobin 29.5, Mean 

Corpuscular Hemoglobin Concent 32.2, Red Cell Distribution Width 13.4, Platelet 

Count 164, Mean Platelet Volume 8.4, Neutrophils (%) (Auto) 57.6, Lymphocytes (%

) (Auto) 25.5, Monocytes (%) (Auto) 11.0H, Eosinophils (%) (Auto) 5.0H, 

Basophils (%) (Auto) 0.9, Sodium Level 147H, Potassium Level 3.1L, Chloride 

Level 104, Carbon Dioxide Level 32H, Anion Gap 11, Blood Urea Nitrogen 11, 

Creatinine 0.6, Estimat Glomerular Filtration Rate , Glucose Level 103, Calcium 

Level 9.2





Current Medications








 Medications


  (Trade)  Dose


 Ordered  Sig/Toribio


 Route


 PRN Reason  Start Time


 Stop Time Status Last Admin


Dose Admin


 


 Acetaminophen


  (Tylenol)  650 mg  Q4H  PRN


 RECTAL


 Fever/Headache/Mild Pain  1/1/17 21:30


 1/31/17 21:29   


 


 


 Acetaminophen/


 Hydrocodone Bitart


  (Norco 10/325)  1 ea  Q6H  PRN


 ORAL


 Pain Scale (6-10)  1/7/17 19:45


 1/14/17 23:59   


 


 


 Albuterol/


 Ipratropium


  (DuoNeb


 0.5-3(2.5)mg/3ml)  3 ml  Q4H  PRN


 HHN


 Shortness of Breath  1/6/17 13:45


 1/11/17 23:59   


 


 


 Albuterol/


 Ipratropium


  (DuoNeb


 0.5-3(2.5)mg/3ml)  3 ml  Q6HRT


 HHN


   1/10/17 13:00


 1/15/17 12:59  1/12/17 13:04


 


 


 Clonidine HCl


  (Catapres)  0.1 mg  Q6H  PRN


 ORAL


 For High Blood Pressure  1/1/17 19:45


 1/31/17 19:44  1/10/17 10:16


 


 


 Dextrose


  (Dextrose 50%)    STAT  PRN


 IV


 Hypoglycemia  1/2/17 01:45


 2/1/17 01:44   


 


 


 Diphenhydramine


 HCl


  (Benadryl)  25 mg  Q6H  PRN


 ORAL


 Itching/Pruritis  1/1/17 19:45


 1/31/17 19:44  1/9/17 20:20


 


 


 Docusate Sodium


  (Colace)  100 mg  DAILY


 ORAL


   1/2/17 09:00


 2/1/17 08:59  1/12/17 09:44


 


 


 Guaifenesin


  (Mucinex)  600 mg  TWICE A  DAY


 ORAL


   1/10/17 11:00


 2/9/17 10:59  1/11/17 16:00


 


 


 Guaifenesin


  (Robitussin)  100 mg  Q4H  PRN


 ORAL


 For Cough  1/1/17 19:00


 1/31/17 18:59  1/7/17 19:44


 


 


 Lorazepam


  (Ativan 2mg/ml


 1ml)  1 mg  Q4H  PRN


 IV


 For Anxiety  1/7/17 17:45


 1/14/17 23:59  1/12/17 01:57


 


 


 Losartan Potassium


  (Cozaar)  50 mg  Q12HR


 ORAL


   1/1/17 21:00


 1/31/17 20:59  1/12/17 09:43


 


 


 Nystatin


  (Nystop Powder)  1 applic  THREE TIMES A  DAY


 TOPIC


   1/4/17 13:00


 2/3/17 12:59  1/12/17 13:45


 


 


 Pantoprazole


  (Protonix)  40 mg  Q12H  PRN


 ORAL


 Abdominal cramps  1/2/17 01:45


 2/1/17 01:44  1/7/17 09:15


 


 


 Warfarin Sodium


  (Coumadin per


 pharmacy)  1 ea  DAILY  PRN


 MISC


 Per rx protocol  1/2/17 09:00


 2/1/17 08:59   


 

















SEJAL DEJESUS M.D. Jan 12, 2017 18:45

## 2017-01-12 NOTE — GENERAL PROGRESS NOTE
Assessment/Plan


Assessment/Plan


ASSESSMENT:


#. DVT of the bilateral lower extremities - s/p ivc permanent IVC filter and on 

coumadin, goal INR 2-3


#. Anemia potentially secondary to anemia of chronic disease - stable


#. Leukocytosis. Improved


#. Coagulopathy 2/2 coumadin


#. Trochanteric fracture of the right femur status post open reduction and 

internal fixation and repair


#. Sepsis


#. PNA





RECOMMENDATIONS:


1. Continue coumadin


2. Monitor for bleed with cbc


3. Goal INR 2-3 


4. Continue antibiotics prn basis


5. Transfuse if hgb < 7


6. Continue nutritional support


7. Followup pulm, cards, renal recs


8.  Staff





Thank you,





Daniel Sierra MD





Subjective


Constitutional:  Reports: no symptoms


HEENT:  Reports: no symptoms


Cardiovascular:  Reports: no symptoms


Respiratory:  Reports: no symptoms


Gastrointestinal/Abdominal:  Reports: poor appetite


Genitourinary:  Reports: no symptoms


Neurologic/Psychiatric:  Reports: no symptoms


Endocrine:  Reports: no symptoms


Hematologic/Lymphatic:  Reports: anemia


Allergies:  


Coded Allergies:  


     No Known Allergies (Unverified , 10/4/16)


Subjective


stable, not bleeding, tolerating coumadin





Objective





Last 24 Hour Vital Signs








  Date Time  Temp Pulse Resp B/P Pulse Ox O2 Delivery O2 Flow Rate FiO2


 


1/12/17 13:14  75 18  99 Nasal Cannula 2.0 


 


1/12/17 13:03  74 18  99 Nasal Cannula 2.0 28


 


1/12/17 09:43    138/77    


 


1/12/17 08:00 97.5 88 18 114/61 95 Nasal Cannula 2.0 


 


1/12/17 07:22  82 18  98 Nasal Cannula 2.0 28


 


1/12/17 07:15     97 Nasal Cannula 2.0 28


 


1/12/17 07:12      Nasal Cannula 2.0 28


 


1/12/17 06:50  82 18  97 Nasal Cannula 2.0 


 


1/12/17 04:00 97.7 89 24 116/61 97 Room Air 2.0 


 


1/12/17 01:18  103 18  98 Nasal Cannula 2.0 28


 


1/12/17 01:08  101 18  97 Nasal Cannula 2.0 


 


1/12/17 00:00 97.7 113 20 158/100 95 Room Air 2.0 


 


1/11/17 21:30    130/77    


 


1/11/17 20:00 98.2 99 20 150/75 98 Nasal Cannula 2.0 


 


1/11/17 20:00  96 18  99 Nasal Cannula 2.0 28


 


1/11/17 19:52      Nasal Cannula 2.0 28


 


1/11/17 19:52  98 18  97 Nasal Cannula 2.0 


 


1/11/17 19:51     97 Nasal Cannula 2.0 28

















Intake and Output  


 


 1/11/17 1/12/17





 19:00 07:00


 


Intake Total 200 ml 


 


Output Total 300 ml 750 ml


 


Balance -100 ml -750 ml


 


  


 


Intake Oral 200 ml 


 


Output Urine Total 300 ml 750 ml


 


# Bowel Movements 1 








Laboratory Tests


1/12/17 06:35: 


White Blood Count 8.0, Red Blood Count 3.67L, Hemoglobin 10.8L, Hematocrit 33.6L

, Mean Corpuscular Volume 92, Mean Corpuscular Hemoglobin 29.5, Mean 

Corpuscular Hemoglobin Concent 32.2, Red Cell Distribution Width 13.4, Platelet 

Count 164, Mean Platelet Volume 8.4, Neutrophils (%) (Auto) 57.6, Lymphocytes (%

) (Auto) 25.5, Monocytes (%) (Auto) 11.0H, Eosinophils (%) (Auto) 5.0H, 

Basophils (%) (Auto) 0.9, Sodium Level 147H, Potassium Level 3.1L, Chloride 

Level 104, Carbon Dioxide Level 32H, Anion Gap 11, Blood Urea Nitrogen 11, 

Creatinine 0.6, Estimat Glomerular Filtration Rate , Glucose Level 103, Calcium 

Level 9.2


Height (Feet):  5


Height (Inches):  6.00


Weight (Pounds):  140


General Appearance:  no apparent distress


EENT:  TMs normal


Neck:  supple


Cardiovascular:  regular rhythm


Respiratory/Chest:  chest wall non-tender


Abdomen:  no organomegaly


Pelvis:  no masses


Extremities:  non-tender


Edema:  no edema noted Leg (L), no edema noted Leg (R)


Edema:  mild edema


Neurologic:  no motor/sensory deficits, responsive


Skin:  warm/dry











Daniel Sierra Jan 12, 2017 17:11

## 2017-01-13 NOTE — NEPHROLOGY PROGRESS NOTE
Assessment/Plan


Problem List:  


(1) Hypokalemia


Assessment:  corrected.





(2) DVT (deep venous thrombosis)


(3) HTN (hypertension)


(4) Alzheimer's dementia


(5) Dysphagia


(6) Depression


(7) CVA (cerebral vascular accident)


(8) Brain aneurysm


(9) HCAP (healthcare-associated pneumonia)


(10) Dyspnea


(11) Congestive heart failure (CHF)


(12) Gram positive bacterial infection


Plan


abx per ID. 


pulm hygiene. 


f/u recs per pulm and cardio. 


d/c plan home with home O2.





Subjective


Subjective


late entry for 1/12 - no acute events.





Objective


Objective





Last 24 Hour Vital Signs








  Date Time  Temp Pulse Resp B/P Pulse Ox O2 Delivery O2 Flow Rate FiO2


 


1/13/17 08:15 98.3 91 20 149/95 95 Nasal Cannula 2.0 


 


1/13/17 07:29      Nasal Cannula 2.0 28


 


1/13/17 07:29  88 18  96 Nasal Cannula 2.0 28


 


1/13/17 07:29     92 Nasal Cannula 2.0 28


 


1/13/17 07:29  83 18  92 Nasal Cannula 2.0 28


 


1/13/17 04:00 97.0 75 18 153/79 96 Nasal Cannula 2.0 


 


1/13/17 00:00 97.3 93 20 150/79 94 Nasal Cannula 2.0 


 


1/12/17 21:21    138/77    


 


1/12/17 20:07  69 18  98 Nasal Cannula 2.0 28


 


1/12/17 20:00 97.0 93 18 156/90 93 Nasal Cannula 2.0 


 


1/12/17 19:58      Nasal Cannula 2.0 28


 


1/12/17 19:58  67 18  97 Nasal Cannula 2.0 28


 


1/12/17 19:58     97 Nasal Cannula 2.0 28


 


1/12/17 13:14  75 18  99 Nasal Cannula 2.0 


 


1/12/17 13:03  74 18  99 Nasal Cannula 2.0 28


 


1/12/17 09:43    138/77    

















Intake and Output  


 


 1/12/17 1/13/17





 19:00 07:00


 


Intake Total 200 ml 


 


Output Total 150 ml 595 ml


 


Balance 50 ml -595 ml


 


  


 


Intake Oral 200 ml 


 


Output Urine Total 150 ml 595 ml








Laboratory Tests


1/12/17 19:20: 


Prothrombin Time 29.7H, Prothromb Time International Ratio 2.8H


Height (Feet):  5


Height (Inches):  6.00


Weight (Pounds):  140


General Appearance:  no apparent distress


Cardiovascular:  normal rate, regular rhythm


Respiratory/Chest:  decreased breath sounds


Abdomen:  non tender, soft


Extremities:  trace edema


Neurologic:  alert











KIMBERLY JERNIGAN Jan 13, 2017 08:35

## 2017-01-13 NOTE — CARDIAC ELECTROPHYSIOLOGY PN
Assessment/Plan


Status Narrative


Echo 10/13/2016


M-mode measurements of left ventricle not obtainable due to cardiac position 


(angle)


Normal left ventricular chamber size, systolic function and wall motion to 

extent 


visualized.


Left ventricular ejection fraction estimated to be  65 %.


Moderate left ventricular hypertrophy by 2-D.


No evidence of pericardial effusion. 


All other cardiac chamber sizes are within normal limits. 


Focal aortic valve sclerosis with adequate cusp excursion.


Thickened mitral valve leaflets with normal excursion.


Mitral annulus and aortic root calcification.


Pulmonic valve not well visualized.


Normal tricuspid valve structure. 


IVC at normal size with physiologic collapse.





A  color flow and spectral Doppler study was performed and revealed:


Mild aortic regurgitation.


Mild mitral regurgitation.


Mitral diastolic velocities suggest reduced left ventricular relaxation c/w 

mild LV 


diastolic dysfunction (Grade I). 


Trace to mild tricuspid regurgitation.


Tricuspid  systolic velocities suggests peak right ventricular systolic 

pressure of  31  


mmHg.


Assessment/Plan


1. Shortness of breath. Echo 10/2016 EF 65%. No MI. Resolved.


2. Bilateral LE DVT without pulmonary embolism. On Coumadin, S/P IVC filter.


3. Hypertension. On Losartan 50 mg b.i.d.  


4. Pneumonia  


5. Dysphagia.





ROMERO RN





Subjective


Subjective


Awake in NAD with no new events.





Objective





Last 24 Hour Vital Signs








  Date Time  Temp Pulse Resp B/P Pulse Ox O2 Delivery O2 Flow Rate FiO2


 


1/13/17 10:20    140/77    


 


1/13/17 08:15 98.3 91 20 149/95 95 Nasal Cannula 2.0 


 


1/13/17 07:29      Nasal Cannula 2.0 28


 


1/13/17 07:29  88 18  96 Nasal Cannula 2.0 28


 


1/13/17 07:29     92 Nasal Cannula 2.0 28


 


1/13/17 07:29  83 18  92 Nasal Cannula 2.0 28


 


1/13/17 04:00 97.0 75 18 153/79 96 Nasal Cannula 2.0 


 


1/13/17 00:00 97.3 93 20 150/79 94 Nasal Cannula 2.0 


 


1/12/17 21:21    138/77    


 


1/12/17 20:07  69 18  98 Nasal Cannula 2.0 28


 


1/12/17 20:00 97.0 93 18 156/90 93 Nasal Cannula 2.0 


 


1/12/17 19:58      Nasal Cannula 2.0 28


 


1/12/17 19:58  67 18  97 Nasal Cannula 2.0 28


 


1/12/17 19:58     97 Nasal Cannula 2.0 28


 


1/12/17 13:14  75 18  99 Nasal Cannula 2.0 


 


1/12/17 13:03  74 18  99 Nasal Cannula 2.0 28

















Intake and Output  


 


 1/12/17 1/13/17





 19:00 07:00


 


Intake Total 200 ml 


 


Output Total 150 ml 595 ml


 


Balance 50 ml -595 ml


 


  


 


Intake Oral 200 ml 


 


Output Urine Total 150 ml 595 ml











Laboratory Tests








Test


  1/12/17


19:20 1/13/17


09:30


 


Prothrombin Time


  29.7 SEC


(9.30-11.50)  H 25.9 SEC


(9.30-11.50)  H


 


Prothromb Time International


Ratio 2.8 (0.9-1.1)


H 2.5 (0.9-1.1)


H








Objective


HEAD AND NECK:  Shows no JVD.


LUNGS:  Clear


CARDIOVASCULAR:  Regular S1 and S2 with no gallop or murmur.


ABDOMEN:  Soft.


EXTREMITIES:  No pitting edema.











MALCOLM ZULUAGA Jan 13, 2017 11:30

## 2017-01-13 NOTE — INFECTIOUS DISEASES PROG NOTE
Assessment/Plan


Assessment/Plan





ASSESSMENT: 86-year-old female with:





Pneumonia - RVP(+) RSV


   CXR 1/10: Lungs are essentially clear


  Chest CT: Diffuse bilateral interstitial disease, possibly with micro-

nodularity, disseminated inflammatory lesions a possibility.


CONS bacteremia  - repeat BCx(-) 12/31, TTE(-) SBE  SP Rx


UTI , probable - UCx(-)  SP Rx


Fever - resolved, no leukocytosis








Acute BLE DVT SP IVC filter


CVA.


History of brain aneurysm.


Dementia.


NKDA


Full Code








PLAN:





ok to DC off of antimicrobials from ID standpoint


  ( SP IV Rocephin, azithro, IV Vanco d# 7 / 7 )


monitor CBC, temperatures


monitor BMP


monitor CXR





Subjective


Allergies:  


Coded Allergies:  


     No Known Allergies (Unverified , 10/4/16)


Subjective





remains afebrile. comfortable


for possible DC





Objective


Vital Signs





Last 24 Hour Vital Signs








  Date Time  Temp Pulse Resp B/P Pulse Ox O2 Delivery O2 Flow Rate FiO2


 


1/13/17 11:39 98.1 82 20 143/74 95 Room Air  


 


1/13/17 10:20    140/77    


 


1/13/17 08:15 98.3 91 20 149/95 95 Nasal Cannula 2.0 


 


1/13/17 07:29      Nasal Cannula 2.0 28


 


1/13/17 07:29  88 18  96 Nasal Cannula 2.0 28


 


1/13/17 07:29     92 Nasal Cannula 2.0 28


 


1/13/17 07:29  83 18  92 Nasal Cannula 2.0 28


 


1/13/17 04:00 97.0 75 18 153/79 96 Nasal Cannula 2.0 


 


1/13/17 00:00 97.3 93 20 150/79 94 Nasal Cannula 2.0 


 


1/12/17 21:21    138/77    


 


1/12/17 20:07  69 18  98 Nasal Cannula 2.0 28


 


1/12/17 20:00 97.0 93 18 156/90 93 Nasal Cannula 2.0 


 


1/12/17 19:58      Nasal Cannula 2.0 28


 


1/12/17 19:58  67 18  97 Nasal Cannula 2.0 28


 


1/12/17 19:58     97 Nasal Cannula 2.0 28








Height (Feet):  5


Height (Inches):  6.00


Weight (Pounds):  140


General Appearance:  no acute distress


Respiratory/Chest:  no respiratory distress


Cardiovascular:  normal rate, regular rhythm


Abdomen:  normal bowel sounds, soft, non tender, non distended





Laboratory Tests








Test


  1/12/17


19:20 1/13/17


09:30


 


Prothrombin Time


  29.7 SEC


(9.30-11.50)  H 25.9 SEC


(9.30-11.50)  H


 


Prothromb Time International


Ratio 2.8 (0.9-1.1)


H 2.5 (0.9-1.1)


H











Current Medications








 Medications


  (Trade)  Dose


 Ordered  Sig/Toribio


 Route


 PRN Reason  Start Time


 Stop Time Status Last Admin


Dose Admin


 


 Acetaminophen


  (Tylenol)  650 mg  Q4H  PRN


 RECTAL


 Fever/Headache/Mild Pain  1/1/17 21:30


 1/31/17 21:29   


 


 


 Acetaminophen/


 Hydrocodone Bitart


  (Norco 10/325)  1 ea  Q6H  PRN


 ORAL


 Pain Scale (6-10)  1/7/17 19:45


 1/14/17 23:59   


 


 


 Albuterol/


 Ipratropium


  (DuoNeb


 0.5-3(2.5)mg/3ml)  3 ml  Q4H  PRN


 HHN


 Shortness of Breath  1/6/17 13:45


 1/11/17 23:59   


 


 


 Albuterol/


 Ipratropium


  (DuoNeb


 0.5-3(2.5)mg/3ml)  3 ml  TIDRT


 HHN


   1/12/17 19:00


 1/17/17 18:59  1/13/17 07:57


 


 


 Clonidine HCl


  (Catapres)  0.1 mg  Q6H  PRN


 ORAL


 For High Blood Pressure  1/1/17 19:45


 1/31/17 19:44  1/10/17 10:16


 


 


 Dextrose


  (Dextrose 50%)    STAT  PRN


 IV


 Hypoglycemia  1/2/17 01:45


 2/1/17 01:44   


 


 


 Diphenhydramine


 HCl


  (Benadryl)  25 mg  Q6H  PRN


 ORAL


 Itching/Pruritis  1/1/17 19:45


 1/31/17 19:44  1/9/17 20:20


 


 


 Docusate Sodium


  (Colace)  100 mg  DAILY


 ORAL


   1/2/17 09:00


 2/1/17 08:59  1/13/17 10:19


 


 


 Guaifenesin


  (Mucinex)  600 mg  TWICE A  DAY


 ORAL


   1/10/17 11:00


 2/9/17 10:59  1/12/17 19:52


 


 


 Guaifenesin


  (Robitussin)  100 mg  Q4H  PRN


 ORAL


 For Cough  1/1/17 19:00


 1/31/17 18:59  1/7/17 19:44


 


 


 Lorazepam


  (Ativan 2mg/ml


 1ml)  1 mg  Q4H  PRN


 IV


 For Anxiety  1/7/17 17:45


 1/14/17 23:59  1/12/17 01:57


 


 


 Losartan Potassium


  (Cozaar)  50 mg  Q12HR


 ORAL


   1/1/17 21:00


 1/31/17 20:59  1/13/17 10:20


 


 


 Nystatin


  (Nystop Powder)  1 applic  THREE TIMES A  DAY


 TOPIC


   1/4/17 13:00


 2/3/17 12:59  1/13/17 10:19


 


 


 Pantoprazole


  (Protonix)  40 mg  Q12H  PRN


 ORAL


 Abdominal cramps  1/2/17 01:45


 2/1/17 01:44  1/7/17 09:15


 


 


 Warfarin Sodium


  (Coumadin per


 pharmacy)  1 ea  DAILY  PRN


 MISC


 Per rx protocol  1/2/17 09:00


 2/1/17 08:59   


 

















CECY BREWER Jan 13, 2017 13:44

## 2017-01-13 NOTE — GENERAL PROGRESS NOTE
Assessment/Plan


Assessment/Plan


ASSESSMENT:


#. DVT of the bilateral lower extremities - s/p ivc permanent IVC filter and on 

coumadin, goal INR 2-3


#. Anemia potentially secondary to anemia of chronic disease - stable


#. Leukocytosis. Improved


#. Coagulopathy 2/2 coumadin


#. Trochanteric fracture of the right femur status post open reduction and 

internal fixation and repair


#. Sepsis


#. PNA





RECOMMENDATIONS:


1. Continue coumadin


2. Monitor for bleed with cbc


3. Goal INR 2-3 


4. Continue antibiotics prn basis


5. Transfuse if hgb < 7


6. Nutritional support


7. Followup pulm, cards, renal recs


8.  Staff





Thank you,





Daniel Sierra MD





Subjective


Constitutional:  Reports: no symptoms


HEENT:  Reports: no symptoms


Cardiovascular:  Reports: no symptoms


Respiratory:  Reports: no symptoms


Gastrointestinal/Abdominal:  Reports: poor appetite


Genitourinary:  Reports: no symptoms


Neurologic/Psychiatric:  Reports: no symptoms


Endocrine:  Reports: no symptoms


Hematologic/Lymphatic:  Reports: anemia


Allergies:  


Coded Allergies:  


     No Known Allergies (Unverified , 10/4/16)


Subjective


stable, not bleeding, tolerating coumadin well





Objective





Last 24 Hour Vital Signs








  Date Time  Temp Pulse Resp B/P Pulse Ox O2 Delivery O2 Flow Rate FiO2


 


1/13/17 10:20    140/77    


 


1/13/17 08:15 98.3 91 20 149/95 95 Nasal Cannula 2.0 


 


1/13/17 07:29      Nasal Cannula 2.0 28


 


1/13/17 07:29  88 18  96 Nasal Cannula 2.0 28


 


1/13/17 07:29     92 Nasal Cannula 2.0 28


 


1/13/17 07:29  83 18  92 Nasal Cannula 2.0 28


 


1/13/17 04:00 97.0 75 18 153/79 96 Nasal Cannula 2.0 


 


1/13/17 00:00 97.3 93 20 150/79 94 Nasal Cannula 2.0 


 


1/12/17 21:21    138/77    


 


1/12/17 20:07  69 18  98 Nasal Cannula 2.0 28


 


1/12/17 20:00 97.0 93 18 156/90 93 Nasal Cannula 2.0 


 


1/12/17 19:58      Nasal Cannula 2.0 28


 


1/12/17 19:58  67 18  97 Nasal Cannula 2.0 28


 


1/12/17 19:58     97 Nasal Cannula 2.0 28


 


1/12/17 13:14  75 18  99 Nasal Cannula 2.0 


 


1/12/17 13:03  74 18  99 Nasal Cannula 2.0 28

















Intake and Output  


 


 1/12/17 1/13/17





 19:00 07:00


 


Intake Total 200 ml 


 


Output Total 150 ml 595 ml


 


Balance 50 ml -595 ml


 


  


 


Intake Oral 200 ml 


 


Output Urine Total 150 ml 595 ml








Laboratory Tests


1/12/17 19:20: 


Prothrombin Time 29.7H, Prothromb Time International Ratio 2.8H


1/13/17 09:30: 


Prothrombin Time 25.9H, Prothromb Time International Ratio 2.5H


Height (Feet):  5


Height (Inches):  6.00


Weight (Pounds):  140


General Appearance:  alert


EENT:  normal ENT inspection


Neck:  normal alignment


Cardiovascular:  normal rate


Respiratory/Chest:  normal breath sounds


Abdomen:  no organomegaly


Extremities:  non-tender


Edema:  no edema noted Leg (L), no edema noted Leg (R)


Edema:  trace edema


Neurologic:  alert


Skin:  warm/dry











Daniel Sierra Jan 13, 2017 11:20

## 2017-01-19 NOTE — DISCHARGE SUMMARY
Discharge Summary


Hospital Course


Date of Admission


Dec 28, 2016 at 22:00


Date of Discharge


Jan 13, 2017 at 17:00


Admitting Diagnosis


 aspiration pneumonia


HPI


Estefania Cunha is a 86 year old female who was admitted on Dec 28, 2016 

at 22:00 for Aspiration Pneumonia


Hospital Course


[id number]





Discharge


Discharge Disposition


Patient was discharged to Home (01)


Discharge Diagnoses:  











Danielle Bernal NP Jan 19, 2017 18:19
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
## 2017-05-20 NOTE — EMERGENCY ROOM REPORT
History of Present Illness


General


Chief Complaint:  Multiple Trauma/Fall


Source:  Family Member, Medical Record





Present Illness


HPI


Is an 86-year-old female who has a history dementia.  She had a recent fall 

with hip fracture status post surgery.  Now she is in a wheelchair.  She was 

brought in by her daughter for chief complaint of right ankle pain.  She stood 

up from the wheelchair yesterday and felt backward.  Chief the wheelchair.  No 

trauma to the head.  Since then daughter said the right ankle is swollen.  

Patient does not complain of pain in that area.  Daughter said that she also 

has a chronic cough the last 2 weeks.  Nonproductive in nature.  No fever or 

chills.  No nausea no vomiting.  No chest pain.


Allergies:  


Coded Allergies:  


     No Known Allergies (Unverified , 10/4/16)





Patient History


Past Medical History:  see triage record, old chart reviewed


Past Surgical History:  other


Pertinent Family History:  none


Social History:  Denies: smoking


Pregnant Now:  No


Immunizations:  other


Reviewed Nursing Documentation:  PMH: Agreed, PSxH: Agreed





Nursing Documentation-PMH


Past Medical History:  No History, Except For


Hx Cardiac Problems:  Yes


Hx Hypertension:  Yes


Hx Cancer:  No


Hx Gastrointestinal Problems:  Yes


Hx Neurological Problems:  Yes - ANEURYSM BRAIN


Hx Dementia:  Yes





Review of Systems


Eye:  Denies: blurred vision, eye pain


ENT:  Denies: ear pain, nose congestion, throat swelling


Respiratory:  Reports: cough, Denies: shortness of breath


Cardiovascular:  Denies: chest pain, palpitations


Gastrointestinal:  Denies: abdominal pain, diarrhea, nausea, vomiting


Musculoskeletal:  Reports: joint swelling, Denies: back pain, joint pain


Skin:  Denies: rash


Neurological:  Denies: headache, numbness


Endocrine:  Denies: increased thirst, increased urine


Hematologic/Lymphatic:  Denies: easy bruising


All Other Systems:  negative except mentioned in HPI





Physical Exam





Vital Signs








  Date Time  Temp Pulse Resp B/P Pulse Ox O2 Delivery O2 Flow Rate FiO2


 


5/19/17 23:31 97.2 78 23 144/59 96 Room Air  





vitals normal


Sp02 EP Interpretation:  reviewed, normal


General Appearance:  well appearing, no apparent distress, alert


Head:  normocephalic, atraumatic


Eyes:  bilateral eye EOMI, bilateral eye PERRL


ENT:  hearing grossly normal, normal pharynx


Neck:  full range of motion, supple, no meningismus


Respiratory:  chest non-tender, lungs clear, normal breath sounds


Cardiovascular #1:  regular rate, rhythm, no murmur


Gastrointestinal:  normal bowel sounds, non tender, no mass, no organomegaly, 

no bruit, non-distended


Musculoskeletal:  back normal, normal range of motion, other - edema to right 

lateral malleolus.


Psychiatric:  mood/affect normal


Skin:  warm/dry





Medical Decision Making


Diagnostic Impression:  


 Primary Impression:  


 Upper respiratory infection, acute


 Additional Impression:  


 Right ankle sprain


 Qualified Codes:  S93.411A - Sprain of calcaneofibular ligament of right ankle

, initial encounter


ER Course


Patient present with a fall and right ankle sprain.  No fracture or 

dislocation.  No evidence of hip dislocation.  Because of the coughing 2 weeks 

will go ahead and put on antibiotics for that she does have pneumonia, CHF, ACS 

to name a few.


Chest X-Ray Diagnostic Results


EP Interpretation:  Yes


Findings:  no consolidation, no effusion, no pneumothorax, no acute 

cardiopulmonary disease


Number of Views:  1





Other X-Ray Diagnostic Results


Other X-Ray Diagnostic Results :  


   X-Ray Ordered:  xr right ankle.


   Date:  May 20, 2017


   Time:  01:27


   EP Interpretation:  Yes


   Findings:  no fractures, no dislocation, no soft tissue swelling, other - 

degenerative changes.


   Number of Views:  3





Last Vital Signs








  Date Time  Temp Pulse Resp B/P Pulse Ox O2 Delivery O2 Flow Rate FiO2


 


5/20/17 01:06  62 28 119/50 95 Room Air  


 


5/19/17 23:33 97.2       








Status:  improved


Disposition:  HOME, SELF-CARE


Condition:  Stable


Scripts


Azithromycin* (ZITHROMAX*) 250 Mg Tablet


250 MG ORAL DAILY, #6 TAB 0 Refills


   Take two tablets by mouth today, then take one tablet by mouth daily


   for four days


   Prov: QUIANA CHAMBERS M.D.         5/20/17


Referrals:  


NON PHYSICIAN (PCP)





Additional Instructions:  


Followup with your DrTello in 7 days.  Return if worse.











QUIANA CHAMBERS M.D. May 20, 2017 01:28

## 2017-05-20 NOTE — DIAGNOSTIC IMAGING REPORT
Indications:  Fall, right ankle injury and pain



Technique: 3 views right ankle.



Findings:



Comparison: None



Posterior margin of the calcaneus excluded from lateral image. Anterolateral soft

tissues are significantly swollen.. No fracture, dislocation, joint space

widening     , medial soft tissue swelling, foreign body/gas, or other acute changes

are identified.



IMPRESSION:



Consider ATFL sprain



No other evidence of acute injury, limited as described.

## 2017-05-20 NOTE — DIAGNOSTIC IMAGING REPORT
Indications:  Shortness of breath



Technique:  AP chest



Findings:



Comparison:  1/10/2017



Cardiac silhouette remains normal in size. Pulmonary vasculature remains within

normal limits. Curvilinear density in the face overlies the medial aspect of the

right lower lung, through which the bronchovascular markings are easily seen. 

Mildly

increased interstitial markings persist in both lung bases. Lungs and pleura 

remain

otherwise clear. Mild calcification and elongation of the aortic arch are again

noted. Filter is present in the region of the inferior vena cava.



IMPRESSION:



New density interface overlying medial right lung base most likely represent skin

fold. Upright PA and lateral chest radiographs with better inspiratory effort and

optimal technique recommended for more complete evaluation.



Otherwise no evidence of acute disease, unchanged



Stable chronic changes as described

## 2018-08-25 NOTE — EMERGENCY ROOM REPORT
History of Present Illness


General


Chief Complaint:  Edema


Source:  Family Member, Medical Record





Present Illness


HPI


This is an 87-year-old [place] female with history of high blood pressure, 

dementia with psychotic feature, and right hip replacement in the past.  She 

presents with chief complaint of fever and right leg swelling.  Onset last 

night.  Also with redness.  Similar symptom in the past with cellulitis.  No 

nausea no vomiting.  No chest pain.  History is otherwise limited on this 

patient because of her dementia.  History is through her daughter.


Allergies:  


Coded Allergies:  


     No Known Allergies (Unverified , 10/4/16)





Patient History


Past Medical History:  see triage record, old chart reviewed, HTN, dementia


Past Surgical History:  other


Pertinent Family History:  none


Social History:  Denies: smoking


Pregnant Now:  No


Immunizations:  other


Reviewed Nursing Documentation:  PMH: Agreed; PSxH: Agreed





Nursing Documentation-PMH


Hx Cardiac Problems:  Yes


Hx Hypertension:  Yes


Hx Cancer:  No


Hx Gastrointestinal Problems:  Yes


Hx Neurological Problems:  Yes - ANEURYSM BRAIN


Hx Dementia:  Yes





Review of Systems


Constitutional:  Reports: fever


Eye:  Denies: eye pain, blurred vision


ENT:  Denies: ear pain, nose congestion, throat swelling


Respiratory:  Denies: cough, shortness of breath


Cardiovascular:  Denies: chest pain, palpitations


Gastrointestinal:  Denies: abdominal pain, diarrhea, nausea, vomiting


Musculoskeletal:  Reports: joint swelling; Denies: back pain, joint pain


Skin:  Denies: rash


Neurological:  Denies: headache, numbness


Endocrine:  Denies: increased thirst, increased urine


Hematologic/Lymphatic:  Denies: easy bruising


All Other Systems:  negative except mentioned in HPI





Physical Exam





Vital Signs








  Date Time  Temp Pulse Resp B/P (MAP) Pulse Ox O2 Delivery O2 Flow Rate FiO2


 


8/25/18 21:51 97.0 70 16 144/74 91 Room Air  





 97.0       





vitals normal


Sp02 EP Interpretation:  reviewed, normal


General Appearance:  well appearing, no apparent distress, alert, obese


Head:  normocephalic, atraumatic


Eyes:  bilateral eye PERRL, bilateral eye EOMI


ENT:  hearing grossly normal, normal pharynx


Neck:  full range of motion, supple, no meningismus


Respiratory:  chest non-tender, lungs clear, normal breath sounds


Cardiovascular #1:  regular rate, rhythm, no murmur


Gastrointestinal:  normal bowel sounds, non tender, no mass, no organomegaly, 

no bruit, non-distended


Musculoskeletal:  back normal, gait/station normal, normal range of motion, 

other - Right lower leg with edema, cellulitis and warmth.  Minimal tenderness 

to palpation.  Pulses normal.


Neurologic:  alert, oriented x3


Psychiatric:  mood/affect normal


Skin:  warm/dry





Medical Decision Making


Diagnostic Impression:  


 Primary Impression:  


 Acute DVT (deep venous thrombosis)


 Qualified Codes:  I82.411 - Acute embolism and thrombosis of right femoral vein


 Additional Impressions:  


 UTI (urinary tract infection)


 Qualified Codes:  N30.00 - Acute cystitis without hematuria


 Alzheimer's dementia


 Qualified Codes:  G30.1 - Alzheimer's disease with late onset; F02.80 - 

Dementia in other diseases classified elsewhere without behavioral disturbance


ER Course


Patient presents with left leg edema and erythema.  She is positive for DVT.  

Also has UTI.  Antibiotics given.  Lovenox given.  She is a fall risk so 

questionable long-term anti-coagulation needed to be addressed.  She may 

benefit from a IVC filter.  I will defer this to the admitting doctor and 

hematologist.


Lab Results Impression


labs unremarkable


EKG Diagnostic Results


Rate:  normal


Rhythm:  NSR


ST Segments:  no acute changes





Rhythm Strip Diag. Results


Rhythm Strip Time:  01:15


EP Interpretation:  yes


Rate:  73


Rhythm:  NSR





Chest X-Ray Diagnostic Results


Chest X-Ray Diagnostic Results :  


   Chest X-Ray Ordered:  Yes


   # of Views/Limited/Complete:  1 View


   Indication:  Chest Pain


   EP Interpretation:  Yes


   Interpretation:  no consolidation, no effusion, no pneumothorax, no acute 

cardiopulmonary disease


   Impression:  No acute disease


   Electronically Signed by:  Gurvinder Sykes MD





CT/MRI/US Diagnostic Results


CT/MRI/US Diagnostic Results :  


   Imaging Test Ordered:  Right leg US


   Impression


Positive for DVT of the femoral vein.  Read by ultrasonographer.





Last Vital Signs








  Date Time  Temp Pulse Resp B/P (MAP) Pulse Ox O2 Delivery O2 Flow Rate FiO2


 


8/25/18 21:51 97.0 70 16 144/74 91 Room Air  





 97.0       








Status:  improved


Disposition:  ADMITTED AS INPATIENT


Condition:  Serious











GURVINDER SYKES M.D. Aug 25, 2018 22:13

## 2018-08-25 NOTE — DIAGNOSTIC IMAGING REPORT
EXAM:

  XR Chest, 1 View

 

CLINICAL HISTORY:

  SOB

 

TECHNIQUE:

  Frontal view of the chest.

 

COMPARISON:

Compared to 5/20/17.

FINDINGS:

  Lungs:  Some mild increased interstitial markings are suspected in the 

lungs which are likely slightly more prominent compared to the prior exam.

  This may be related to some interstitial edema.  No definite plain film 

evidence for focal infiltrate.

  Pleural space:  No plain film evidence for pneumothorax.

  Heart:  Prominence of the cardiac silhouette.

  Mediastinum:  Unremarkable.

  Bones/joints:  Degenerative changes of the thoracic spine.  

Degenerative changes of the left acromioclavicular joint.

  Vasculature:  Calcifications are projected in the aortic knob.

  Upper abdomen:  An IVC filter is projected along the right side of the 

lumbar spine which is partially imaged.

 

IMPRESSION:     

1.  Some mild increased interstitial markings are suspected in the lungs 

which are likely slightly more prominent compared to the prior exam.  

This may be related to some interstitial edema.

2.  Prominence of the cardiac silhouette.

## 2018-08-27 NOTE — HISTORY AND PHYSICAL REPORT
DATE OF ADMISSION:  08/26/2018



CHIEF COMPLAINT/REASON FOR HOSPITALIZATION:  The patient is admitted with

DVT.



HISTORY OF PRESENT ILLNESS:  The patient has a history of dementia,

hypertension, gastritis, and prior DVT and IVC filter.  She lives at home.

History is taken from the family.  She presents with a swollen right leg

and evidence on noninvasive studies of an acute femoral DVT.  There was

also some evidence of possibly urinary tract infection with pyuria.



PAST SURGICAL HISTORY:  Right total hip, IVC filter, aneurysm in the brain

with embolization of the aneurysm.



PAST MEDICAL HISTORY:  Significant for CVA with left-sided

weakness.



MEDICATIONS:  At home include atenolol 25 mg b.i.d., donepezil 10 mg daily,

lorazepam 2 mg at bedtime, omeprazole 20 mg daily, and Risperdal 1.5 mg at

noon and 1 mg at bedtime.



ALLERGIES:  None.



REVIEW OF SYSTEMS:  The patient is unable.  Major problems as

above.



CODE STATUS:  DNR.



PHYSICAL EXAMINATION:

GENERAL:  The patient is an obese lady, lying in bed, in no acute

distress.

VITAL SIGNS:  Temperature 98.5 degrees, pulse 70, respirations 33, blood

pressure 177/76, and O2 saturation 95%.

HEENT:  Sclerae are nonicteric.  Ocular motions intact in all directions.

Oral mucosa slightly dry.

NECK:  No adenopathy or thyroid enlargement.

LUNGS:  Clear.

HEART:  Regular rhythm.  No murmur.

ABDOMEN:  Soft without organomegaly or masses.

EXTREMITIES:  Marked swelling and some redness of the right lower

extremities.  Trace edema in left lower extremities.  Degenerative changes

in the knees.

NEUROLOGIC:  She is alert, confused, disoriented, somewhat

hyperventilating.  She moves all extremities, still has residual

left-sided weakness.



LABORATORY AND DIAGNOSTIC DATA:  Laboratories reviewed.



IMPRESSION:

1. DVT.

2. History of dementia.

3. History of CVA.

4. Obesity.

5. Osteoarthritis.

6. Gastritis.

7. History of hypertension.

8. History of IVC filter.

9. Pyuria, possibly urinary tract infection.



PLAN:  The patient is started on Lovenox and Coumadin.  This is discussed

with the family.  She is given empiric antibiotics and we will observe her

response to these measures.









  ______________________________________________

  Presley Lomax M.D.





DR:  BRAULIO

D:  08/26/2018 13:17

T:  08/27/2018 00:03

JOB#:  [id number]

CC:

## 2018-08-27 NOTE — GENERAL PROGRESS NOTE
Assessment/Plan


Problem List:  


(1) CVA (cerebral vascular accident)


ICD Codes:  I63.9 - Cerebral infarction, unspecified


SNOMED:  171499035


(2) HTN (hypertension)


ICD Codes:  I10 - Essential (primary) hypertension


SNOMED:  59387778


(3) DVT (deep venous thrombosis)


ICD Codes:  I82.409 - Acute embolism and thrombosis of unspecified deep veins 

of unspecified lower extremity


SNOMED:  484271859


(4) Congestive heart failure (CHF)


ICD Codes:  I50.9 - Heart failure, unspecified


SNOMED:  41674286


(5) Acute DVT (deep venous thrombosis)


ICD Codes:  I82.409 - Acute embolism and thrombosis of unspecified deep veins 

of unspecified lower extremity


SNOMED:  638933438175543


Qualifiers:  


   Qualified Codes:  I82.411 - Acute embolism and thrombosis of right femoral 

vein


Assessment/Plan


lovenox, warfarin





Subjective


ROS Limited/Unobtainable:  Yes


Allergies:  


Coded Allergies:  


     No Known Allergies (Unverified , 10/4/16)





Objective





Last 24 Hour Vital Signs








  Date Time  Temp Pulse Resp B/P (MAP) Pulse Ox O2 Delivery O2 Flow Rate FiO2


 


8/27/18 17:01  75  151/67    


 


8/27/18 16:00 98.6 75 23 151/67 (95) 93   





 98.6       


 


8/27/18 12:00 97.6 66 22 124/60 (81) 93   





 97.6       


 


8/27/18 09:17    127/65    


 


8/27/18 09:16  70  127/65    


 


8/27/18 09:00      Room Air  


 


8/27/18 08:00 97.8 70 20 127/65 (85) 96   





 97.8       


 


8/27/18 07:18  77 18   Room Air  21


 


8/27/18 04:00 97.2 77 22 112/65 (81) 95   





 97.2       


 


8/27/18 00:00 97.1 66 22 101/52 (68) 97   





 97.1       


 


8/26/18 21:50    162/77    


 


8/26/18 21:11  73 24   Room Air  21


 


8/26/18 21:00      Room Air  


 


8/26/18 20:00 99.6 97 19 169/80 (109) 95   





 99.6       

















Intake and Output  


 


 8/26/18 8/27/18





 19:00 07:00


 


Intake Total 360 ml 480 ml


 


Balance 360 ml 480 ml


 


  


 


Intake Oral 360 ml 480 ml


 


# Voids 2 3


 


# Bowel Movements 1 1








Laboratory Tests


8/27/18 07:40: 


White Blood Count 6.9, Red Blood Count 4.85, Hemoglobin 13.6, Hematocrit 42.0, 

Mean Corpuscular Volume 87, Mean Corpuscular Hemoglobin 28.0, Mean Corpuscular 

Hemoglobin Concent 32.4, Red Cell Distribution Width 12.5, Platelet Count 127L, 

Mean Platelet Volume 8.2, Neutrophils (%) (Auto) 63.8, Lymphocytes (%) (Auto) 

22.4, Monocytes (%) (Auto) 10.0, Eosinophils (%) (Auto) 2.5, Basophils (%) (Auto

) 1.4, Prothrombin Time 12.0H, Prothromb Time International Ratio 1.1, Sodium 

Level 139, Potassium Level 4.6, Chloride Level 106, Carbon Dioxide Level 26, 

Anion Gap 7, Blood Urea Nitrogen 12, Creatinine 0.8, Estimat Glomerular 

Filtration Rate , Glucose Level 94, Calcium Level 8.9, Pro-B-Type Natriuretic 

Peptide 1425H


Height (Feet):  5


Height (Inches):  5.00


Weight (Pounds):  150


General Appearance:  no apparent distress, overweight


EENT:  normal ENT inspection


Neck:  normal alignment


Cardiovascular:  normal rate, regular rhythm


Respiratory/Chest:  lungs clear


Abdomen:  non tender


Edema:  moderate edema


Neurologic:  alert, motor weakness











OFELIA GONSALES Aug 27, 2018 19:32

## 2018-08-28 NOTE — GENERAL PROGRESS NOTE
Assessment/Plan


Problem List:  


(1) CVA (cerebral vascular accident)


ICD Codes:  I63.9 - Cerebral infarction, unspecified


SNOMED:  800151963


(2) HTN (hypertension)


ICD Codes:  I10 - Essential (primary) hypertension


SNOMED:  61283418


(3) DVT (deep venous thrombosis)


ICD Codes:  I82.409 - Acute embolism and thrombosis of unspecified deep veins 

of unspecified lower extremity


SNOMED:  041300956


(4) Congestive heart failure (CHF)


ICD Codes:  I50.9 - Heart failure, unspecified


SNOMED:  59790563


(5) Acute DVT (deep venous thrombosis)


ICD Codes:  I82.409 - Acute embolism and thrombosis of unspecified deep veins 

of unspecified lower extremity


SNOMED:  853899400620540


Qualifiers:  


   Qualified Codes:  I82.411 - Acute embolism and thrombosis of right femoral 

vein


Assessment/Plan


lovenox, warfarin,atb d/w C





Subjective


ROS Limited/Unobtainable:  Yes


Allergies:  


Coded Allergies:  


     No Known Allergies (Unverified , 10/4/16)





Objective





Last 24 Hour Vital Signs








  Date Time  Temp Pulse Resp B/P (MAP) Pulse Ox O2 Delivery O2 Flow Rate FiO2


 


8/28/18 16:00 97.0 82 22 146/73 (97) 95   





 97.0       


 


8/28/18 12:00 97.3 88 21 133/77 (95) 95   





 97.3       


 


8/28/18 09:00      Room Air  





      Room Air  


 


8/28/18 08:16  120  177/92    


 


8/28/18 08:16    177/92    


 


8/28/18 08:11  79 20   Room Air  21


 


8/28/18 08:00 97.5 120 20 177/92 (120) 94   





 97.5       


 


8/28/18 04:00 98.5 79 21 122/71 (88) 92   





 98.5       


 


8/27/18 21:00    142/90    


 


8/27/18 21:00      Room Air  





      Room Air  


 


8/27/18 21:00 97.8 88 19 142/90 (107) 92   





 97.8       


 


8/27/18 19:58  75 22   Room Air  21

















Intake and Output  


 


 8/27/18 8/28/18





 19:00 07:00


 


Intake Total 240 ml 


 


Balance 240 ml 


 


  


 


Intake Oral 240 ml 


 


# Voids 3 3


 


# Bowel Movements 1 








Laboratory Tests


8/28/18 03:50: 


Prothrombin Time 13.0H, Prothromb Time International Ratio 1.2H


Height (Feet):  5


Height (Inches):  5.00


Weight (Pounds):  150


General Appearance:  no apparent distress, confused


EENT:  normal ENT inspection


Neck:  normal alignment


Cardiovascular:  normal rate


Respiratory/Chest:  lungs clear


Abdomen:  non tender, soft


Edema:  moderate edema


Neurologic:  motor weakness











OFELIA GONSALES Aug 28, 2018 18:31

## 2018-08-29 NOTE — GENERAL PROGRESS NOTE
Assessment/Plan


Problem List:  


(1) CVA (cerebral vascular accident)


ICD Codes:  I63.9 - Cerebral infarction, unspecified


SNOMED:  407520266


(2) HTN (hypertension)


ICD Codes:  I10 - Essential (primary) hypertension


SNOMED:  28457924


(3) DVT (deep venous thrombosis)


ICD Codes:  I82.409 - Acute embolism and thrombosis of unspecified deep veins 

of unspecified lower extremity


SNOMED:  605364357


(4) Congestive heart failure (CHF)


ICD Codes:  I50.9 - Heart failure, unspecified


SNOMED:  42868161


(5) Acute DVT (deep venous thrombosis)


ICD Codes:  I82.409 - Acute embolism and thrombosis of unspecified deep veins 

of unspecified lower extremity


SNOMED:  078439267528709


Qualifiers:  


   Qualified Codes:  I82.411 - Acute embolism and thrombosis of right femoral 

vein


Assessment/Plan


lovenox, warfarin,atb change to keflex d/w C





Subjective


ROS Limited/Unobtainable:  Yes


Allergies:  


Coded Allergies:  


     No Known Allergies (Unverified , 10/4/16)





Objective





Last 24 Hour Vital Signs








  Date Time  Temp Pulse Resp B/P (MAP) Pulse Ox O2 Delivery O2 Flow Rate FiO2


 


8/29/18 12:00 98.1 65 18 126/63 (84) 92   





 98.1       


 


8/29/18 09:00      Room Air  





      Room Air  


 


8/29/18 09:00    135/65    


 


8/29/18 08:59  78  135/65    


 


8/29/18 08:36 98.9 78 19 135/65 (88) 92   





 98.9       


 


8/29/18 04:00 98.2 62 20 108/70 (83) 92   





 98.2       


 


8/29/18 00:00 98.4 65 20 91/44 (60) 93   





 98.4       


 


8/28/18 21:46    142/66    


 


8/28/18 21:00      Room Air  





      Room Air  


 


8/28/18 20:15  82 18   Room Air  21


 


8/28/18 20:00 97.1 80 19 142/66 (91) 93   





 97.1       


 


8/28/18 18:42  82  146/73    

















Intake and Output  


 


 8/28/18 8/29/18





 19:00 07:00


 


Intake Total 480 ml 


 


Balance 480 ml 


 


  


 


Intake Oral 480 ml 


 


# Voids 2 2


 


# Bowel Movements  1








Laboratory Tests


8/29/18 05:45: 


Prothrombin Time 14.4H, Prothromb Time International Ratio 1.4H


Height (Feet):  5


Height (Inches):  5.00


Weight (Pounds):  164


General Appearance:  no apparent distress, alert


EENT:  normal ENT inspection


Neck:  normal alignment


Cardiovascular:  normal rate


Respiratory/Chest:  lungs clear


Abdomen:  non tender, soft


Edema:  mild edema


Neurologic:  disoriented











OFELIA GONSALES Aug 29, 2018 16:40

## 2018-08-30 NOTE — GENERAL PROGRESS NOTE
Assessment/Plan


Problem List:  


(1) CVA (cerebral vascular accident)


ICD Codes:  I63.9 - Cerebral infarction, unspecified


SNOMED:  042697098


(2) HTN (hypertension)


ICD Codes:  I10 - Essential (primary) hypertension


SNOMED:  14105283


(3) DVT (deep venous thrombosis)


ICD Codes:  I82.409 - Acute embolism and thrombosis of unspecified deep veins 

of unspecified lower extremity


SNOMED:  390758957


(4) Congestive heart failure (CHF)


ICD Codes:  I50.9 - Heart failure, unspecified


SNOMED:  32307004


(5) Acute DVT (deep venous thrombosis)


ICD Codes:  I82.409 - Acute embolism and thrombosis of unspecified deep veins 

of unspecified lower extremity


SNOMED:  632559530508837


Qualifiers:  


   Qualified Codes:  I82.411 - Acute embolism and thrombosis of right femoral 

vein


Assessment/Plan


lovenox can dc now , warfarin,atb change to keflex d/w CM  dc planned





Subjective


ROS Limited/Unobtainable:  Yes


Allergies:  


Coded Allergies:  


     No Known Allergies (Unverified , 10/4/16)





Objective





Last 24 Hour Vital Signs








  Date Time  Temp Pulse Resp B/P (MAP) Pulse Ox O2 Delivery O2 Flow Rate FiO2


 


8/30/18 09:56  78  154/77    


 


8/30/18 09:56    154/77    


 


8/30/18 09:00      Room Air  





      Room Air  


 


8/30/18 08:00 98.1  19 154/77 (102) 96   





 98.1       


 


8/30/18 04:00 97.5 78 20 125/60 (81) 93   





 97.5       


 


8/30/18 01:35  68 18  95 Room Air  21


 


8/30/18 01:25  65 18  92 Room Air  21


 


8/30/18 00:37     92   


 


8/30/18 00:00 97.1 75 24 115/54 (74) 90   





 97.1       


 


8/29/18 21:48    149/73    


 


8/29/18 21:00      Room Air  





      Room Air  


 


8/29/18 20:00 98.0 76 17 149/73 (98) 92   





 98.0       


 


8/29/18 20:00  70 18   Room Air  21


 


8/29/18 17:13  69  118/71    


 


8/29/18 16:00 98.8 69 17 118/71 (87) 93   





 98.8       

















Intake and Output  


 


 8/29/18 8/30/18





 19:00 07:00


 


Intake Total 200 ml 


 


Balance 200 ml 


 


  


 


Other 200 ml 


 


# Voids 3 2








Laboratory Tests


8/30/18 06:10: 


Prothrombin Time 21.2H, Prothromb Time International Ratio 2.1H


Height (Feet):  5


Height (Inches):  5.00


Weight (Pounds):  164


General Appearance:  no apparent distress, alert, confused


EENT:  normal ENT inspection


Neck:  supple


Cardiovascular:  normal rate, regular rhythm


Respiratory/Chest:  lungs clear


Abdomen:  non tender, soft


Edema:  mild edema


Neurologic:  CNs II-XII grossly normal











OFELIA GONSALES Aug 30, 2018 12:57

## 2018-08-30 NOTE — DIAGNOSTIC IMAGING REPORT
--------------- APPROVED REPORT --------------





CPT Code: 39552



Present Symptoms

Lower Extremity Pain:  Right 

Lower Extremity Edema: Right  





RIGHT LEG:  Venous imaging reveals acute thrombus in the superficial femoral and 

popliteal veins. The calf veins not well visualized, due to swelling. There is no 

evidence of thrombus in the common femoral vein. The greater saphenous vein is also 

within normal limits. 

ER Doctor was notified of abnormal results at 2340 hours.

## 2018-08-31 NOTE — DISCHARGE SUMMARY
DATE OF ADMISSION:  08/26/2018



DATE OF DISCHARGE:  08/31/2018



PERTINENT HISTORY:  See the dictated History and Physical for full details.

The patient is an 87-year-old lady with dementia, agitation, prior DVT

and IVC filter.  She presents with a swollen leg.



PERTINENT PHYSICAL FINDINGS:

LUNGS:  Clear.

HEART:  Regular rhythm.

EXTREMITIES:  Show marked swelling and redness of the right lower

extremity.  Degenerative changes in the knees.

NEUROLOGIC:  She is alert, somewhat confused.



COURSE IN THE HOSPITAL:  The patient was treated for UTI and DVT with

Lovenox and warfarin.  She had no fever or chills.  Her exam remained

stable, but the swelling and redness improved.  On the day of discharge,

her vital signs were stable.  Lungs, clear.  Heart, regular rhythm and she

had only mild swelling and no redness of her right lower extremity.  Her

INR was 2.1 on 08/30/2018.  She was discharged home on Coumadin 3 mg

daily, Keflex 500 mg q.i.d. for 4 days and her prior to admission

medications.  Follow up in the office with Dr. Lomax or her prior provider

as per family choice.









  ______________________________________________

  Presley Lomax M.D.





DR:  BRAULIO

D:  08/30/2018 12:56

T:  08/31/2018 01:52

JOB#:  [id number]

CC:



[place]

## 2019-03-12 NOTE — EMERGENCY ROOM REPORT
History of Present Illness


General


Chief Complaint:  Generalized Weakness


Source:  Patient, Family Member, EMS





Present Illness


HPI


Patient presents emergency department today with acute shortness of breath 

fever.  Patient was recently admitted to [facility] and discharge or leg edema.  

Presumably patient has CHF.  Patient has not been feeling well.  And was noted 

to be hypotensive short of breath and was brought here for further evaluation.  

Patient was brought in by the paramedics.  Symptoms noted to be highly severe 

to critical.  Patient was unable to thrive much history.  History is 

essentially provide patient's family.  Patient was full code very short of 

breath some cough and some suggestion of possible fever.  No other complaints 

are noted by family symptoms noted to be highly severe to critical.No other 

modifying factors.  No other associated signs and symptoms.  No other 

complaints were noted.


Allergies:  


Coded Allergies:  


     No Known Allergies (Unverified , 10/4/16)





Patient History


Past Medical History:  HTN, CAD, dementia, other - Brain aneurysm


Pertinent Family History:  none


Social History:  Denies: smoking, alcohol use, drug use


Reviewed Nursing Documentation:  PMH: Agreed; PSxH: Agreed





Nursing Documentation-PMH


Past Medical History:  No History, Except For


Hx Cardiac Problems:  Yes


Hx Hypertension:  Yes


Hx Cancer:  No


Hx Gastrointestinal Problems:  Yes


Hx Neurological Problems:  Yes - BRAIN ANEURYSM


Hx Dementia:  Yes





Review of Systems


All Other Systems:  limited - Poor mental status





Physical Exam





Vital Signs








  Date Time  Temp Pulse Resp B/P (MAP) Pulse Ox O2 Delivery O2 Flow Rate FiO2


 


3/12/19 17:35 99.9 82 20 104/68 99 Non-Rebreather 15.0 


 


3/12/19 18:50        100








Sp02 EP Interpretation:  reviewed, normal


General Appearance:  alert, severe distress


Head:  normocephalic, atraumatic


Eyes:  bilateral eye normal inspection


ENT:  hearing grossly normal, normal voice, dry mucus membranes


Neck:  normal inspection, full range of motion, supple, no bony tend


Respiratory:  respiratory distress, decreased breath sounds, accessory muscle 

use, crackles - Basilar, wheezing, expiration, inspiration


Cardiovascular #1:  regular rate, rhythm, no edema


Gastrointestinal:  normal inspection, normal bowel sounds, non tender, soft, no 

guarding, no hernia


Genitourinary:  no CVA tenderness


Musculoskeletal:  decreased range of motion - Bilateral lower extremity, 

swelling - Bilateral lower extremity worse on right


Neurologic:  alert, responsive, other - Nonfocal but very weak confused


Psychiatric:  depressed affect, other - And unable to fully assess


Skin:  normal inspection, normal color, no rash





Procedures


Critical Care Time


Critical Care Time


Patient had a critical medical condition which untreated could potentially 

result in life or limb threatening injury.  Total critical care time excluding 

procedures was approximately 65 minutes.





Central Line


Central Line :  


   Consent:  Verbal


   Central Line Lumen:  triple


   Maximal Sterile Barrier Tech:  yes cap, yes mask, yes sterile gown, yes 

sterile gloves, yes large sterile sheet, yes hand hygiene, yes chlorhexidine 

prep


   Central Line Postion:  subclavian (R)


   Anesthesia:  local


   cc's of anesthesia:  5


   Complications:  none


   Central Line Post Position:  sutured, good blood return


   Attempts:  Other


   Patient Tolerated:  Well


   Complications:  None


Progress


Patient require central line placement.  Initial central line attempt was in 

patient's left groin.  Left groin was prepped in a sterile manner.  

Unfortunately I wasn't able to insert the central line into the left femoral 

vein.  Therefore the site was abandoned.  Approximate 3 attempts were performed.





Patient's left neck was prepped in a sterile manner and attempt was performed 

to place a left internal jugular central venous catheter.  Unfortunately I wasn'

t able to aspirate blood from the internal jugular vein.  Therefore site was 

abandoned.  Approximate 3 attempts were performed.  I did not aspirate any air 

there was no comp lesions associated with the procedure other than a failed 

attempt.





Been patient's left subclavian area was prepped in a sterile manner.  I was 

able to aspirate blood from the subclavian vein with one attempt.  X-ray 

confirms this to be in good position.  There is no comp lesions associate with 

the procedure patient help or seizure without difficulty.  Blood was aspirated 

from the subclavian vein.  Guidewire was placed in the needle.  Dilated then 

was placed in the guidewire.  Central line was placed and then tied into place 

at approximately 15 cm.  X-ray shows of the central line unfortunately is not 

deep enough and appeared to be against the SVC.  Therefore the central line was 

pulled back so that it will reside in the subclavian.  X-rays show that the 

central line was in good position working.  There is no complications 

associated with this procedure.





Intubation


Intubation :  


   Consent:  Verbal


   Intubation Method:  orotracheal


   Tube Size (cm):  7.5


   Medications:  Etomidate, Rocuronium


   Breath Sounds after Intubation:  equal


   Intubation Complications:  no complications


   Post Intubation Xray:  Yes


   Progress/Xray Impression:  Pulse x-ray showed ET tube to be a little deep it 

as pulled back 2 cm


   Attempts:  One


   Patient Tolerated:  Well


   Complications:  None





Medical Decision Making


Diagnostic Impression:  


 Primary Impression:  


 Congestive heart failure (CHF)


 Additional Impressions:  


 Respiratory distress


 Respiratory failure


 Hypotension


 Sepsis


 Septic shock


 DVT (deep vein thrombosis) in pregnancy


 Peripheral edema


ER Course


Patient presents emergency department today complaining of shortness of breath.

  Differential diagnoses include acute pneumonia, CHF, acute coronary syndrome, 

pneumothorax, asthma, COPD flare, just to name a few.  Patient was also noted 

to be hypotensive.  Patient was initially placed on BiPAP started with 

albuterol Atrovent as well as Solu-Medrol.  Patient was given a small fluid 

bolus.  Unfortunately patient did not improve significantly she still appear to 

be short of breath.  Then patient's blood pressure unfortunately decreased to 

the 80s systolic.  Patient was more combative and altered.  Because of this I 

felt the patient require intubation.  Patient was then intubated.  Central line 

was also initiated and finally was placed in her left subclavian.  Patient was 

given IV antibiotics as well as started on pressors to control her blood 

pressure.  Chest x-ray shows evidence of infiltrate.  Was only also evidence of 

CHF.  Because of patient's respiratory failure patient was intubated.  Case was 

discussed in detail with Dr. Nigel Aguayo.  Patient will be admitted to the 

ICU for further treatment.  Because of patient's hypotension patient was 

started on a dopamine drip and blood pressure did improve significantly.  

Patient also no was also given a fluid bolus after she was intubated because 

airway was already controlled and that she needed likely the fluid to try to 

maintain blood pressure.  Patient's family was aware of what is happening.  

Patient's family was also aware of patient's poor prognosis.  Patient remains 

full code.  Case was also discussed with Dr. Presley Lomax who admitted patient 

in the past.  Dr. Donovan however isn't available therefore case was discussed 

with Dr. Williamson.  Dr. Aguayo will consult on ICU management.Patient also has a 

history of DVT but currently has IVC filter.  Patient has evidence of sepsis 

was started on broad-spectrum IV antibiotics.  His felt the patient could have 

a pneumonia urine infection likely cellulitis.  Involving the right lower 

extremity.





Labs








Test


  3/12/19


18:06 3/12/19


18:38


 


White Blood Count


  8.9 K/UL


(4.8-10.8) 


 


 


Red Blood Count


  4.23 M/UL


(4.20-5.40) 


 


 


Hemoglobin


  11.2 G/DL


(12.0-16.0) 


 


 


Hematocrit


  35.7 %


(37.0-47.0) 


 


 


Mean Corpuscular Volume 84 FL (80-99)  


 


Mean Corpuscular Hemoglobin


  26.4 PG


(27.0-31.0) 


 


 


Mean Corpuscular Hemoglobin


Concent 31.3 G/DL


(32.0-36.0) 


 


 


Red Cell Distribution Width


  15.0 %


(11.6-14.8) 


 


 


Platelet Count


  109 K/UL


(150-450) 


 


 


Mean Platelet Volume


  7.8 FL


(6.5-10.1) 


 


 


Neutrophils (%) (Auto)


  74.0 %


(45.0-75.0) 


 


 


Lymphocytes (%) (Auto)


  10.9 %


(20.0-45.0) 


 


 


Monocytes (%) (Auto)


  13.9 %


(1.0-10.0) 


 


 


Eosinophils (%) (Auto)


  0.2 %


(0.0-3.0) 


 


 


Basophils (%) (Auto)


  1.0 %


(0.0-2.0) 


 


 


Urine Color Yellow  


 


Urine Appearance


  Slightly


cloudy 


 


 


Urine pH 5 (4.5-8.0)  


 


Urine Specific Gravity


  1.020


(1.005-1.035) 


 


 


Urine Protein 2+ (NEGATIVE)  


 


Urine Glucose (UA)


  Negative


(NEGATIVE) 


 


 


Urine Ketones


  Negative


(NEGATIVE) 


 


 


Urine Blood 5+ (NEGATIVE)  


 


Urine Nitrite


  Positive


(NEGATIVE) 


 


 


Urine Bilirubin


  Negative


(NEGATIVE) 


 


 


Urine Urobilinogen


  Normal MG/DL


(0.0-1.0) 


 


 


Urine Leukocyte Esterase 1+ (NEGATIVE)  


 


Urine RBC


  30-40 /HPF (0


- 2) 


 


 


Urine WBC


  2-4 /HPF (0 -


2) 


 


 


Urine Squamous Epithelial


Cells Few /LPF


(NONE/OCC) 


 


 


Urine Bacteria


  Moderate /HPF


(NONE) 


 


 


Sodium Level


  140 MMOL/L


(136-145) 


 


 


Potassium Level


  4.3 MMOL/L


(3.5-5.1) 


 


 


Chloride Level


  107 MMOL/L


() 


 


 


Carbon Dioxide Level


  26 MMOL/L


(21-32) 


 


 


Anion Gap


  7 mmol/L


(5-15) 


 


 


Blood Urea Nitrogen


  25 mg/dL


(7-18) 


 


 


Creatinine


  1.1 MG/DL


(0.55-1.30) 


 


 


Estimat Glomerular Filtration


Rate  mL/min (>60) 


  


 


 


Glucose Level


  99 MG/DL


() 


 


 


Lactic Acid Level


  0.90 mmol/L


(0.4-2.0) 


 


 


Calcium Level


  8.1 MG/DL


(8.5-10.1) 


 


 


Total Bilirubin


  0.3 MG/DL


(0.2-1.0) 


 


 


Aspartate Amino Transf


(AST/SGOT) 24 U/L (15-37) 


  


 


 


Alanine Aminotransferase


(ALT/SGPT) 14 U/L (12-78) 


  


 


 


Alkaline Phosphatase


  51 U/L


() 


 


 


Troponin I


  0.010 ng/mL


(0.000-0.056) 


 


 


Pro-B-Type Natriuretic Peptide


  900 pg/mL


(0-125) 


 


 


Total Protein


  6.8 G/DL


(6.4-8.2) 


 


 


Albumin


  2.8 G/DL


(3.4-5.0) 


 


 


Globulin 4.0 g/dL  


 


Albumin/Globulin Ratio 0.7 (1.0-2.7)  


 


Lipase


  172 U/L


() 


 


 


Arterial Blood pH


  


  7.385


(7.350-7.450)


 


Arterial Blood Partial


Pressure CO2 


  36.6 mmHg


(35.0-45.0)


 


Arterial Blood Partial


Pressure O2 


  313.8 mmHg


(75.0-100.0)


 


Arterial Blood HCO3


  


  21.4 mmol/L


(22.0-26.0)


 


Arterial Blood Oxygen


Saturation 


  99.1 %


()


 


Arterial Blood Base Excess  -3.1 (-2-2) 


 


Joaquin Test  Positive 








EKG Diagnostic Results


Rate:  normal


Rhythm:  NSR


ST Segments:  no acute changes





Rhythm Strip Diag. Results


EP Interpretation:  yes


Rate:  77


Rhythm:  NSR, no PVC's, no ectopy





Chest X-Ray Diagnostic Results


Chest X-Ray Diagnostic Results #1:  


   Chest X-Ray Ordered:  Yes


   # of Views/Limited/Complete:  1 View


   Indication:  Shortness of Breath


   EP Interpretation:  Yes


   Interpretation:  no pneumothorax, other - Pulmonary congestion, cardiomegaly

,  Questionable basilar infiltrate


   Impression:  Other - Left-sided pneumonia, pul congestion


   Electronically Signed by:  Electronically signed by Luther Olivares MD


Chest X-Ray Diagnostic Results #2:  


   Chest X-Ray Ordered:  Yes


   # of Views/Limited/Complete:  1 View


   Indication:  Shortness of Breath


   EP Interpretation:  Yes


   Interpretation:  no pneumothorax, other - Cardiomegaly, pulmonary congestion

, left side infiltrate, ET tube in place,, subclavian in place


   Impression:  Other - Interval placement of ET tube and left subclav


   Electronically Signed by:  Electronically signed by Luther Olivares MD


Chest X-Ray Diagnostic Results #3:  


   Chest X-Ray Ordered:  Yes


   # of Views/Limited/Complete:  1 View


   Indication:  Shortness of Breath


   EP Interpretation:  Yes


   Interpretation:  other - Left lower lobe infiltrate.  ET better 

position.Left subclavian line pulled back


   Impression:  Other - Pneumonia appropriate placement of Et tube, central line


   Electronically Signed by:  Electronically signed by Luther Olivares MD





Last Vital Signs








  Date Time  Temp Pulse Resp B/P (MAP) Pulse Ox O2 Delivery O2 Flow Rate FiO2


 


3/12/19 21:30    151/64    


 


3/12/19 20:38  132 16     100


 


3/12/19 19:42     100 Bi-pap  


 


3/12/19 18:55       15.0 


 


3/12/19 18:17 98.7       








Status:  improved


Disposition:  ADMITTED AS INPATIENT


Condition:  Critical


Referrals:  


[facility]ERR[facility] (PCP)











Luther Olivares MD Mar 12, 2019 21:53

## 2019-03-13 NOTE — HISTORY & PHYSICAL
History and Physical


History & Physicial


[id number]











Timbo Williamson MD Mar 13, 2019 08:45

## 2019-03-14 NOTE — DIAGNOSTIC IMAGING REPORT
Indication: Post line placement. Post intubation

 

Technique: One view of the chest

 

Comparison: 2 1/2 hours earlier

 

Findings: Current exam is less heavily exposed an inspiration is less optimal.

Interim placement of left jugular central venous catheter, tip which projects at the

level of the innominate venous confluence. No pneumothorax demonstrated. Interim

endotracheal intubation, endotracheal tube tip projecting at the level of the emre.

The left hemidiaphragm is obscured, may indicate atelectasis or developing infiltrate

at the left lung base.

 

Impression: Interim endotracheal intubation, endotracheal tube tip somewhat low at

the level of the emre. This is noted to be improved on subsequent chest radiograph

 

Interim left central venous catheter placement, no radiographic evident complication

 

Obscured left hemidiaphragm, probably due to suboptimal inspiration but developing

consolidation also possible.

## 2019-03-14 NOTE — GENERAL PROGRESS NOTE
Assessment/Plan


Status:  stable


Status Narrative


1) S/P respiratory failure most likely due to acute eaxacerbation of diastolic 

CHF


2) Dementia


3) DVT 


4) Hypoprothrombinemia


Plan:


Vitamin K 10 mg SQ x1


Try to wean of vent





Subjective


Allergies:  


Coded Allergies:  


     No Known Allergies (Unverified , 10/4/16)


Subjective


She is still on the vent, INR is 10, no [id number] , she had good diuresis, I/O  

-2.1 L, TTE shows EF of 55%, more alert





Objective





Last 24 Hour Vital Signs








  Date Time  Temp Pulse Resp B/P (MAP) Pulse Ox O2 Delivery O2 Flow Rate FiO2


 


3/14/19 14:00  65 17 136/62 (86) 97   


 


3/14/19 13:06  67 16     40


 


3/14/19 13:00  72 18 136/52 (80) 96   


 


3/14/19 12:00      Mechanical Ventilator  


 


3/14/19 12:00  63      


 


3/14/19 12:00        40


 


3/14/19 12:00 98.0 69 20 143/51 (81) 97   


 


3/14/19 11:19  68 17     40


 


3/14/19 11:00  64 16 116/54 (74) 97   


 


3/14/19 10:00  65 18 145/50 (81) 99   


 


3/14/19 09:00  62 18 120/53 (75) 100   


 


3/14/19 08:52  66 19     40


 


3/14/19 08:00 98.5 63 21 142/54 (83) 100   


 


3/14/19 08:00  61      


 


3/14/19 08:00        40


 


3/14/19 08:00      Mechanical Ventilator  


 


3/14/19 07:15  64 20     40


 


3/14/19 07:00  61 19 142/52 (82) 100   


 


3/14/19 06:00  70 24 136/46 (76) 100   


 


3/14/19 05:21  82 26     40


 


3/14/19 05:00  63 22 113/54 (73) 100   


 


3/14/19 04:00        40


 


3/14/19 04:00  59      


 


3/14/19 04:00      Mechanical Ventilator  


 


3/14/19 04:00 100.1 62 20 134/46 (75) 99   


 


3/14/19 03:30  65 16     40


 


3/14/19 03:00  67 16 112/51 (71) 97   


 


3/14/19 02:00  71 18 123/55 (77) 97   


 


3/14/19 01:30  67 16     40


 


3/14/19 01:00  71 17 119/52 (74) 98   


 


3/14/19 00:00      Mechanical Ventilator  


 


3/14/19 00:00 98.6 68 16 116/61 (79) 97   


 


3/14/19 00:00  63      


 


3/14/19 00:00        40


 


3/13/19 23:30  70 20     40


 


3/13/19 23:00  73 21 136/64 (88) 98   


 


3/13/19 22:00  63 17 125/59 (81) 97   


 


3/13/19 21:11  71 18     40


 


3/13/19 21:00  71 21 123/58 (79) 98   


 


3/13/19 20:00        40


 


3/13/19 20:00  62      


 


3/13/19 20:00      Mechanical Ventilator  


 


3/13/19 20:00 98.3 55 16 107/49 (68) 97   


 


3/13/19 19:30  74 18     40


 


3/13/19 19:00  66 16 135/63 (87) 100   


 


3/13/19 18:00  55 16 119/48 (71) 100   


 


3/13/19 17:00  54 16 124/55 (78) 100   


 


3/13/19 16:33  71 20     40


 


3/13/19 16:00  66      


 


3/13/19 16:00      Mechanical Ventilator  


 


3/13/19 16:00        40


 


3/13/19 16:00 98.5 63 16 114/48 (70) 100   


 


3/13/19 15:00  61 16 118/50 (72) 100   

















Intake and Output  


 


 3/13/19 3/14/19





 18:59 06:59


 


Output Total 790 ml 1390 ml


 


Balance -790 ml -1390 ml


 


  


 


Output Urine Total 790 ml 1390 ml








Laboratory Tests


3/13/19 22:40: Troponin I 0.000


3/14/19 04:00: 


Troponin I 0.003, White Blood Count 8.6#, Red Blood Count 4.32, Hemoglobin 11.5L

, Hematocrit 36.4L, Mean Corpuscular Volume 84, Mean Corpuscular Hemoglobin 

26.6L, Mean Corpuscular Hemoglobin Concent 31.7L, Red Cell Distribution Width 

14.9H, Platelet Count 112L, Mean Platelet Volume 9.7, Neutrophils (%) (Auto) 

84.2H, Lymphocytes (%) (Auto) 5.6L, Monocytes (%) (Auto) 10.1H, Eosinophils (%) 

(Auto) 0.0, Basophils (%) (Auto) 0.1, Prothrombin Time 87.3H, Prothromb Time 

International Ratio 9.4*H, Sodium Level 141, Potassium Level 3.8, Chloride 

Level 107, Carbon Dioxide Level 26, Anion Gap 8, Blood Urea Nitrogen 32H, 

Creatinine 1.1, Estimat Glomerular Filtration Rate , Glucose Level 111H, 

Calcium Level 8.6


3/14/19 08:10: 


Prothrombin Time > 100.0H, Prothromb Time International Ratio > 10.0*H


Height (Feet):  5


Height (Inches):  6.00


Weight (Pounds):  181


General Appearance:  WD/WN, no apparent distress, confused


EENT:  PERRL/EOMI


Neck:  non-tender, normal alignment, supple


Cardiovascular:  normal rate, regular rhythm


Respiratory/Chest:  lungs clear, normal breath sounds


Abdomen:  soft, no mass


Edema:  2+ Leg (L), 2+ Leg (R)


Neurologic:  CNs II-XII grossly normal











Timbo Williamson MD Mar 14, 2019 15:00

## 2019-03-14 NOTE — CARDIOLOGY REPORT
--------------- APPROVED REPORT --------------





EXAM: Two-dimensional and M-mode echocardiogram with Doppler and color 

Doppler.



INDICATION

Congestive Heart Failure 



M-Mode DIMENSIONS 

IVSd0.9 (0.7-1.1cm)Left Atrium (MM)4.1 (1.6-4.0cm)

LVDd6.8 (3.5-5.6cm)Aortic Root2.8 (2.0-3.7cm)

PWd1.0 (0.7-1.1cm)Aortic Cusp Exc.1.3 (1.5-2.0cm)

IVSs1.4 cm

LVDs4.2 (2.5-4.0cm)

PWs1.2 cm





Normal left ventricular chamber size, systolic function and wall motion.

Left ventricular ejection fraction estimated to be  55-60 %.

Mild left ventricular hypertrophy by 2-D.

Anterior Echo-free space, may be due to pericardial fat or effusion.

All other cardiac chamber sizes are within normal limits. 

Aortic valve calcification with decreased cusp excursion c/w aortic stenosis.

Thickened mitral valve leaflets with normal excursion.

Mitral annulus and aortic root calcification.

 Pulmonic valve not well visualized.

Normal tricuspid valve structure. 

IVC at normal size without physiologic collapse.



A  color flow and spectral Doppler study was performed and revealed:

Mild aortic regurgitation.

Peak aortic valve gradient of  23  mm Hg and a mean of 11 mmHg.

Aortic valve area  1.2  cm2 calculated by continuity equation.

Trace mitral regurgitation.

Mitral diastolic velocities suggest reduced left ventricular relaxation c/w mild LV 

diastolic dysfunction (Grade I ).

Mild tricuspid regurgitation.

Tricuspid  systolic velocities suggests peak right ventricular systolic pressure of 34   

mmHg

Mild pulmonic regurgitation present .

## 2019-03-14 NOTE — HISTORY AND PHYSICAL REPORT
DATE OF ADMISSION:  03/12/2019

REASON OF ADMISSION:  Respiratory failure.



HISTORY OF PRESENT ILLNESS:  This is a very pleasant 88-year-old white

female, who is obese, has history of hypercoagulable state with DVT and PE

in the past, has had also underlying dementia, was brought to the

emergency room of [facility] with increasing shortness of

breath.  In the emergency room, chest x-ray was performed showing apical

redistribution of the vessels and interstitial marking enhancement.

However, she was put on BiPAP and according to the ER doctor, her blood

pressure dropped, however, I do not see any decrease in her blood pressure

below 98 systolic and she was treated for presumably pneumonia with

vancomycin, Zosyn, and Zithromax.  She has been intubated and was sent to

the intensive care unit for further treatment.  She is somewhat lethargic

and is not able to give me a very fruitful history at this point.



PAST MEDICAL HISTORY:  Significant for underlying dementia, iron

deficiency, previous DVT and PE status post IVC filter placement,

dysphagia, depression, brain aneurysm, health-care associated pneumonia,

hypertension, previous CVA, and congestive heart failure as well as

previous respiratory failure.



PAST SURGICAL HISTORY:  Status post right intertrochanteric femoral

fracture status post ORIF apparently and status post cataract surgery

removal.



SOCIAL HISTORY:  She is a nonsmoker, nondrinker, and no illicit drugs.



MEDICATIONS:  Prior to admission has been atenolol 100 mg p.o. daily,

Aricept 10 mg p.o. daily, Norco 10/325 mg by mouth every 8 hours as

needed, lorazepam 2 mg by mouth nightly, losartan 50 mg p.o. q.12 h.,

omeprazole 20 mg p.o. daily, potassium chloride 20 mEq by mouth daily,

Seroquel 25 mg by mouth twice a day, Risperdal 0.25 mg by mouth daily, and

warfarin 3 mg by mouth daily.



REVIEW OF SYSTEMS:  She is not able to give me any history.  She is on the

ventilator lethargic.



PHYSICAL EXAMINATION:

GENERAL:  She does not seem to be in much acute distress, lying in bed.

VITAL SIGNS:  Blood pressure is 110/53, pulse 54, respirations 16, and

temperature 98.5.

HEENT:  Head is atraumatic.  Eyes, pupils are reactive to light.  No

evidence of papilledema.  Ears, canals are clear.  Tympanic membranes are

intact.  She has ET tube in place.

NECK:  Supple.  Jugular venous distention is somewhat increased.  No

cervical adenopathies.

HEART:  Regular rhythm.  No gallop.

LUNGS:  There is very fine crackles in bases.

ABDOMEN:  Supple.  Bowel sounds positive.  No hepatosplenomegaly.

EXTREMITIES:  Lower extremity shows 2+ pedal edema bilaterally.

NEUROLOGICAL:  Difficult to assess.  She is very lethargic at this point.

There is no focal neurological deficit present.



LABORATORY DATA:  Shows WBC 5.5, hemoglobin 12, hematocrit 38, and

platelets 99,000.  Sodium 139, potassium 4.3, chloride 108, carbon dioxide

23, BUN 23, and creatinine 0.9.  Albumin is 2.6.  _____ troponin has been

0.01 and also 0.007.



Chest x-ray, as I mentioned showing apical redistribution of the vessels

with some enhancement of the interstitial markings.



EKG, 12-lead EKG showing normal sinus rhythm.  No signs of acute

ischemic changes.



IMPRESSION:

1. Respiratory failure, most likely due to congestive heart failure.  I

doubt pneumonia in the absence of leukocytosis and purulent secretions.

2. She has history of deep venous thrombosis and pulmonary embolism,

status post previous IVC filter placement.  She is on anticoagulation with

Coumadin.

3. Obesity.



PLAN:  We are going to arrange diuresis on her.  The vent management is

going to be performed by the Pulmonary, I am consulting Dr. Aguayo.  We

are going to obtain bilateral lower extremity venous Doppler.  A pro time

has been ordered and we go from there.









  ______________________________________________

  Timob Williamson M.D.





DR:  LORENZO

D:  03/13/2019 08:46

T:  03/13/2019 16:25

JOB#:  [id number]

CC:

## 2019-03-14 NOTE — DIAGNOSTIC IMAGING REPORT
Indication: Cough, status post adjustment of endotracheal tube position

 

Technique: One view of the chest

 

Comparison: One half hour earlier

 

Findings: Interim improvement of position of previously low endotracheal tube, tip

now projecting approximately 3 cm above the emre. And left subclavian central

venous catheter is again demonstrated There is persistent and perhaps increased

administration of the left hemidiaphragm, suspect developing consolidation and/or

pleural fluid. The heart size is normal. Generalized interstitial prominence, central

bronchial wall thickening, and central pulmonary arterial prominence is again noted.

 

Impression: Improved and now satisfactory position of endotracheal tube

 

Increased left basilar opacity, likely increased pleural fluid and/or parenchymal

consolidation

## 2019-03-14 NOTE — DIAGNOSTIC IMAGING REPORT
Indication: Cough, shortness of breath

 

Technique: One view of the chest

 

Comparison: 8/25/2018

 

Findings: Bilateral interstitial prominence and central bronchial wall thickening is

probably on the basis of senescent changes and chronic bronchitis changes. This

appears similar to the previous exam although may be slightly more prominent. No

focal airspace consolidation. No effusions. Normal heart size. Tortuous calcite

aorta.

 

Impression: No definite acute process

 

Mild interstitial prominence and central bronchial wall thickening, likely reflects

senescent changes and chronic bronchitis changes.

## 2019-03-14 NOTE — PULMONOLOGY PROGRESS NOTE
Assessment/Plan


Assessment/Plan


1. Congestive heart failure.


2. Acute respiratory failure.


3. Hypotension.


4. Possible pneumonia.





CXR w infiltrates


poor weaning parameters


c/s sputum


abx


disc w RN, daughter at bedside





Subjective


ROS Limited/Unobtainable:  Yes


Allergies:  


Coded Allergies:  


     No Known Allergies (Unverified , 10/4/16)





Objective





Last 24 Hour Vital Signs








  Date Time  Temp Pulse Resp B/P (MAP) Pulse Ox O2 Delivery O2 Flow Rate FiO2


 


3/14/19 16:00 98.2 68 18 167/54 (91) 98   


 


3/14/19 16:00        40


 


3/14/19 16:00      Mechanical Ventilator  


 


3/14/19 15:00  67 17 153/53 (86) 97   


 


3/14/19 14:00  65 17 136/62 (86) 97   


 


3/14/19 13:06  67 16     40


 


3/14/19 13:00  72 18 136/52 (80) 96   


 


3/14/19 12:00      Mechanical Ventilator  


 


3/14/19 12:00  63      


 


3/14/19 12:00        40


 


3/14/19 12:00 98.0 69 20 143/51 (81) 97   


 


3/14/19 11:19  68 17     40


 


3/14/19 11:00  64 16 116/54 (74) 97   


 


3/14/19 10:00  65 18 145/50 (81) 99   


 


3/14/19 09:00  62 18 120/53 (75) 100   


 


3/14/19 08:52  66 19     40


 


3/14/19 08:00 98.5 63 21 142/54 (83) 100   


 


3/14/19 08:00  61      


 


3/14/19 08:00        40


 


3/14/19 08:00      Mechanical Ventilator  


 


3/14/19 07:15  64 20     40


 


3/14/19 07:00  61 19 142/52 (82) 100   


 


3/14/19 06:00  70 24 136/46 (76) 100   


 


3/14/19 05:21  82 26     40


 


3/14/19 05:00  63 22 113/54 (73) 100   


 


3/14/19 04:00        40


 


3/14/19 04:00  59      


 


3/14/19 04:00      Mechanical Ventilator  


 


3/14/19 04:00 100.1 62 20 134/46 (75) 99   


 


3/14/19 03:30  65 16     40


 


3/14/19 03:00  67 16 112/51 (71) 97   


 


3/14/19 02:00  71 18 123/55 (77) 97   


 


3/14/19 01:30  67 16     40


 


3/14/19 01:00  71 17 119/52 (74) 98   


 


3/14/19 00:00      Mechanical Ventilator  


 


3/14/19 00:00 98.6 68 16 116/61 (79) 97   


 


3/14/19 00:00  63      


 


3/14/19 00:00        40


 


3/13/19 23:30  70 20     40


 


3/13/19 23:00  73 21 136/64 (88) 98   


 


3/13/19 22:00  63 17 125/59 (81) 97   


 


3/13/19 21:11  71 18     40


 


3/13/19 21:00  71 21 123/58 (79) 98   


 


3/13/19 20:00        40


 


3/13/19 20:00  62      


 


3/13/19 20:00      Mechanical Ventilator  


 


3/13/19 20:00 98.3 55 16 107/49 (68) 97   


 


3/13/19 19:30  74 18     40


 


3/13/19 19:00  66 16 135/63 (87) 100   


 


3/13/19 18:00  55 16 119/48 (71) 100   


 


3/13/19 17:00  54 16 124/55 (78) 100   


 


3/13/19 16:33  71 20     40

















Intake and Output  


 


 3/13/19 3/14/19





 18:59 06:59


 


Output Total 790 ml 1390 ml


 


Balance -790 ml -1390 ml


 


  


 


Output Urine Total 790 ml 1390 ml








General Appearance:  no acute distress


HEENT:  atraumatic


Respiratory/Chest:  rhonchi


Cardiovascular:  normal rate





Microbiology








 Date/Time


Source Procedure


Growth Status


 


 


 3/12/19 18:06


Blood Blood Culture - Preliminary Resulted


 


 3/12/19 18:06


Blood Blood Culture - Preliminary


NO GROWTH AFTER 24 HOURS Resulted


 


 3/12/19 18:06


Nasal Nares Influenza Types A,B Antigen (ANNIA) - Final Complete


 


 3/12/19 18:06


Urine,Clean Catch Urine Culture - Final


Escherichia Coli Complete


 


 3/13/19 04:00


Rectum  Received








Laboratory Tests


3/13/19 22:40: Troponin I 0.000


3/14/19 04:00: 


Troponin I 0.003, White Blood Count 8.6#, Red Blood Count 4.32, Hemoglobin 11.5L

, Hematocrit 36.4L, Mean Corpuscular Volume 84, Mean Corpuscular Hemoglobin 

26.6L, Mean Corpuscular Hemoglobin Concent 31.7L, Red Cell Distribution Width 

14.9H, Platelet Count 112L, Mean Platelet Volume 9.7, Neutrophils (%) (Auto) 

84.2H, Lymphocytes (%) (Auto) 5.6L, Monocytes (%) (Auto) 10.1H, Eosinophils (%) 

(Auto) 0.0, Basophils (%) (Auto) 0.1, Prothrombin Time 87.3H, Prothromb Time 

International Ratio 9.4*H, Sodium Level 141, Potassium Level 3.8, Chloride 

Level 107, Carbon Dioxide Level 26, Anion Gap 8, Blood Urea Nitrogen 32H, 

Creatinine 1.1, Estimat Glomerular Filtration Rate , Glucose Level 111H, 

Calcium Level 8.6


3/14/19 08:10: 


Prothrombin Time > 100.0H, Prothromb Time International Ratio > 10.0*H





Current Medications








 Medications


  (Trade)  Dose


 Ordered  Sig/Toribio


 Route


 PRN Reason  Start Time


 Stop Time Status Last Admin


Dose Admin


 


 Dextrose


  (Dextrose 50%)  25 ml  Q30M  PRN


 IV


 Hypoglycemia  3/12/19 22:45


 4/11/19 22:44   


 


 


 Dextrose


  (Dextrose 50%)  50 ml  Q30M  PRN


 IV


 Hypoglycemia  3/12/19 22:45


 4/11/19 22:44   


 


 


 Dopamine HCl/


 Dextrose  250 ml @ 0


 mls/hr  Q24H


 IV


   3/12/19 21:00


 4/11/19 20:59  3/12/19 21:30


 


 


 Furosemide


  (Lasix)  40 mg  EVERY 12  HOURS


 IV


   3/13/19 09:00


 4/12/19 08:59  3/14/19 08:44


 


 


 Ondansetron HCl


  (Zofran)  4 mg  Q6H  PRN


 IVP


 Nausea & Vomiting  3/12/19 22:45


 4/11/19 22:44   


 


 


 Potassium Chloride


  (K-Dur)  20 meq  DAILY


 NGT


   3/13/19 09:00


 4/12/19 08:59  3/14/19 08:44


 

















Nigel Aguayo MD Mar 14, 2019 16:28

## 2019-03-14 NOTE — CONSULTATION
DATE OF CONSULTATION:  03/13/2019

PULMONARY CONSULTATION:



CONSULTING PHYSICIAN:  Nigel Aguayo M.D.



HISTORY OF PRESENT ILLNESS:  The patient is an elderly woman who was

brought in from home because of shortness of breath.  She was seen in the

emergency department and found to have congestive heart failure.  She was

noted to be hypotensive briefly and was started on vasopressors.  She was

intubated and placed on ventilator support and I was called to see her in

consultation.



PAST MEDICAL HISTORY:  This is obtained from the medical records.  The

patient can provide no history.  There is notation of hypertension,

coronary disease, dementia, cerebral aneurysm, GI problems.



ALLERGIES:  No known allergies.



MEDICATIONS:  Reviewed.



PHYSICAL EXAMINATION:

VITAL SIGNS:  Presently stable.  The temperature was as high as 99.9.  The

blood pressure is not elevated on vasopressors.  The other vital signs are

stable.

GENERAL:  She is not alert.

HEENT:  The head is normocephalic.

NECK:  Has jugular vein distention.

CHEST:  Has few rales.

CARDIAC:  Rhythm is regular.

ABDOMEN:  Soft and nontender.

EXTREMITIES:  3+ edema on the right, 2+ on the left leg.



IMAGING:  Chest x-ray is not available for review, but is said to show the

endotracheal tube malpositioned.  It was repositioned by the emergency

physician.



IMPRESSION:

1. Congestive heart failure.

2. Acute respiratory failure.

3. Hypotension.

4. Possible pneumonia.



PLAN:  The patient will be diuresed.  We will maintain ventilator support

until she is more alert and able to breathe well on her own.  Of note, the

white blood count is normal and there is no fever since in the emergency

room, so it is unclear if there is any infection evident.  I have

discussed the case with Dr. Lacey and I will follow her closely with

you.









  ______________________________________________

  Nigel Aguayo M.D. DR:  ALDO

D:  03/13/2019 13:15

T:  03/13/2019 19:29

JOB#:  [id number]

CC:  Presley Lomax M.D.; Fax#:  [phone]

ROBIN LACEY M.D.  ; FAX#:  [phone]

NIGEL AGUAYO M.D.; FAX#:  [phone]

## 2019-03-14 NOTE — DIAGNOSTIC IMAGING REPORT
--------------- APPROVED REPORT --------------





CPT Code: 02102



Present Symptoms

Shortness of breath 





RIGHT LEG: Venous imaging reveals acute thrombus in the superficial femoral vein. Venous 

imaging reveals recanalized chronic thrombus in the common femoral, popliteal and calf 

veins.

LEFT LEG: Venous imaging reveals recanalized chronic thrombus in the common femoral, 

superficial femoral and popliteal veins.  Imaging also reveals patency of the calf veins. 

The greater saphenous vein is also within normal limits. Doppler indicates normal 

spontaneous flow within these segments.

KRUPA Perez was notified of abnormal results at 0845 hours.

## 2019-03-15 NOTE — GENERAL PROGRESS NOTE
Assessment/Plan


Status Narrative


1) S/P respiratory failure most likely due to acute exacerbation of diastolic 

CHF


2) Dementia


3) DVT 


4) Hypoprothrombinemia improved


Plan:


Continue diuresis


Replete K and MG


Work on getting her off the vent


She is started on TF





Subjective


Allergies:  


Coded Allergies:  


     No Known Allergies (Unverified , 10/4/16)


Subjective


She is still on the vent, INR is 2.2 post vitamin K, alert, good diuresis, K is 

3.3





Objective





Last 24 Hour Vital Signs








  Date Time  Temp Pulse Resp B/P (MAP) Pulse Ox O2 Delivery O2 Flow Rate FiO2


 


3/15/19 18:07  65 19 164/56 (92) 100   


 


3/15/19 18:00  62 18 175/61 (99) 100   


 


3/15/19 17:01  51 16     40


 


3/15/19 17:00  67 16 154/63 (93) 99   


 


3/15/19 16:00 98.0 63 17 159/59 (92) 99   


 


3/15/19 15:00  50 16 107/42 (63) 97   


 


3/15/19 14:50  55 16     40


 


3/15/19 14:00  52 17 118/46 (70) 98   


 


3/15/19 13:00  60 16 99/45 (63) 95   


 


3/15/19 12:36  65 20     40


 


3/15/19 12:00      Mechanical Ventilator  





      Mechanical Ventilator  


 


3/15/19 12:00  52      


 


3/15/19 12:00        40


 


3/15/19 12:00 98.0 69 18 165/53 (90) 99   


 


3/15/19 11:00  54 16 119/44 (69) 99   


 


3/15/19 10:52  55 16     40


 


3/15/19 10:00  54 16 159/56 (90) 97   


 


3/15/19 09:00  64 16 159/59 (92) 98   


 


3/15/19 09:00     99   


 


3/15/19 08:54  67 16     40


 


3/15/19 08:00 97.5 57 16 141/60 (87) 97   


 


3/15/19 08:00  57      


 


3/15/19 08:00        40


 


3/15/19 08:00      Mechanical Ventilator  





      Mechanical Ventilator  


 


3/15/19 07:08  71 16     40


 


3/15/19 07:00  65 16 151/70 (97) 98   


 


3/15/19 06:00  56 16 101/46 (64) 97   


 


3/15/19 05:00  65 16 119/47 (71) 97   


 


3/15/19 04:50  70 19     40


 


3/15/19 04:00        40


 


3/15/19 04:00      Mechanical Ventilator  





      Mechanical Ventilator  


 


3/15/19 04:00 98.3 74 19 153/66 (95) 97   


 


3/15/19 04:00  78      


 


3/15/19 03:30  74 20     40


 


3/15/19 03:00  68 19 149/60 (89) 97   


 


3/15/19 02:00  70 19 133/71 (91) 97   


 


3/15/19 01:30  65 22     40


 


3/15/19 01:00 98.6 71 21 133/61 (85) 97   


 


3/15/19 00:00  66      


 


3/15/19 00:00  67 19 147/63 (91) 98   


 


3/15/19 00:00      Mechanical Ventilator  





      Mechanical Ventilator  


 


3/14/19 23:30  68 24     40


 


3/14/19 23:00  68 17 144/55 (84) 97   


 


3/14/19 22:00 98.2 67 20 149/45 (79) 97   


 


3/14/19 21:00  75 22 179/56 (97) 97   


 


3/14/19 20:53  80 16     40


 


3/14/19 20:22    148/64    


 


3/14/19 20:00 99.1 70 16 154/68 (96) 98   


 


3/14/19 20:00      Mechanical Ventilator  





      Mechanical Ventilator  


 


3/14/19 20:00        40


 


3/14/19 20:00  72      

















Intake and Output  


 


 3/14/19 3/15/19





 18:59 06:59


 


Intake Total  55 ml


 


Output Total 1275 ml 1405 ml


 


Balance -1275 ml -1350 ml


 


  


 


Intake IV Total  55 ml


 


Output Urine Total 1275 ml 1405 ml








Laboratory Tests


3/15/19 05:00: 


White Blood Count 8.1, Red Blood Count 4.53, Hemoglobin 12.2, Hematocrit 38.0, 

Mean Corpuscular Volume 84, Mean Corpuscular Hemoglobin 26.9L, Mean Corpuscular 

Hemoglobin Concent 32.1, Red Cell Distribution Width 14.9H, Platelet Count 91L, 

Mean Platelet Volume 10.0, Neutrophils (%) (Auto) , Lymphocytes (%) (Auto) , 

Monocytes (%) (Auto) , Eosinophils (%) (Auto) , Basophils (%) (Auto) , 

Prothrombin Time 22.4H, Prothromb Time International Ratio 2.2H, Sodium Level 

142, Potassium Level 3.3L, Chloride Level 107, Carbon Dioxide Level 27, Anion 

Gap 8, Blood Urea Nitrogen 35H, Creatinine 1.1, Estimat Glomerular Filtration 

Rate , Glucose Level 137H, Calcium Level 8.8, Magnesium Level 1.6L, Pro-B-Type 

Natriuretic Peptide 1477H


Height (Feet):  5


Height (Inches):  6.00


Weight (Pounds):  181


General Appearance:  WD/WN, no apparent distress, alert, other - On the vent


EENT:  PERRL/EOMI


Neck:  non-tender, normal alignment, supple


Cardiovascular:  normal rate, regular rhythm, JVD - high


Respiratory/Chest:  chest wall non-tender, no respiratory distress, crackles/

rales


Edema:  2+ Leg (L), 2+ Leg (R)


Edema:  moderate edema


Neurologic:  alert











Timbo Williamson MD Mar 15, 2019 19:56

## 2019-03-15 NOTE — PULMONOLOGY PROGRESS NOTE
Assessment/Plan


Assessment/Plan


1. Congestive heart failure.


2. Acute respiratory failure.


3. Hypotension.


4. Pneumonia.





poor weaning parameters


c/s sputum


abx


disc w RN, RT


wean when better parameters





Subjective


ROS Limited/Unobtainable:  Yes


Allergies:  


Coded Allergies:  


     No Known Allergies (Unverified , 10/4/16)





Objective





Last 24 Hour Vital Signs








  Date Time  Temp Pulse Resp B/P (MAP) Pulse Ox O2 Delivery O2 Flow Rate FiO2


 


3/15/19 13:00  60 16 99/45 (63) 95   


 


3/15/19 12:36  65 20     40


 


3/15/19 12:00      Mechanical Ventilator  





      Mechanical Ventilator  


 


3/15/19 12:00  52      


 


3/15/19 12:00        40


 


3/15/19 12:00 98.0 69 18 165/53 (90) 99   


 


3/15/19 11:00  54 16 119/44 (69) 99   


 


3/15/19 10:52  55 16     40


 


3/15/19 10:00  54 16 159/56 (90) 97   


 


3/15/19 09:00  64 16 159/59 (92) 98   


 


3/15/19 09:00     99   


 


3/15/19 08:54  67 16     40


 


3/15/19 08:00 97.5 57 16 141/60 (87) 97   


 


3/15/19 08:00  57      


 


3/15/19 08:00        40


 


3/15/19 08:00      Mechanical Ventilator  





      Mechanical Ventilator  


 


3/15/19 07:08  71 16     40


 


3/15/19 07:00  65 16 151/70 (97) 98   


 


3/15/19 06:00  56 16 101/46 (64) 97   


 


3/15/19 05:00  65 16 119/47 (71) 97   


 


3/15/19 04:50  70 19     40


 


3/15/19 04:00        40


 


3/15/19 04:00      Mechanical Ventilator  





      Mechanical Ventilator  


 


3/15/19 04:00 98.3 74 19 153/66 (95) 97   


 


3/15/19 04:00  78      


 


3/15/19 03:30  74 20     40


 


3/15/19 03:00  68 19 149/60 (89) 97   


 


3/15/19 02:00  70 19 133/71 (91) 97   


 


3/15/19 01:30  65 22     40


 


3/15/19 01:00 98.6 71 21 133/61 (85) 97   


 


3/15/19 00:00  66      


 


3/15/19 00:00  67 19 147/63 (91) 98   


 


3/15/19 00:00      Mechanical Ventilator  





      Mechanical Ventilator  


 


3/14/19 23:30  68 24     40


 


3/14/19 23:00  68 17 144/55 (84) 97   


 


3/14/19 22:00 98.2 67 20 149/45 (79) 97   


 


3/14/19 21:00  75 22 179/56 (97) 97   


 


3/14/19 20:53  80 16     40


 


3/14/19 20:22    148/64    


 


3/14/19 20:00 99.1 70 16 154/68 (96) 98   


 


3/14/19 20:00      Mechanical Ventilator  





      Mechanical Ventilator  


 


3/14/19 20:00        40


 


3/14/19 20:00  72      


 


3/14/19 19:30  82 17     40


 


3/14/19 19:00  66 19 149/60 (89) 98   


 


3/14/19 18:00  67 19 153/67 (95) 97   


 


3/14/19 17:00  69 19 147/67 (93) 98   


 


3/14/19 16:47  67 22     40


 


3/14/19 16:00 98.2 68 18 167/54 (91) 98   


 


3/14/19 16:00        40


 


3/14/19 16:00  65      


 


3/14/19 16:00      Mechanical Ventilator  


 


3/14/19 15:00  67 17 153/53 (86) 97   


 


3/14/19 14:36  62 17     40


 


3/14/19 14:00  65 17 136/62 (86) 97   

















Intake and Output  


 


 3/14/19 3/15/19





 19:00 07:00


 


Intake Total  55 ml


 


Output Total 1275 ml 1350 ml


 


Balance -1275 ml -1295 ml


 


  


 


Intake IV Total  55 ml


 


Output Urine Total 1275 ml 1350 ml








General Appearance:  no acute distress


HEENT:  atraumatic


Respiratory/Chest:  lungs clear


Cardiovascular:  normal rate





Microbiology








 Date/Time


Source Procedure


Growth Status


 


 


 3/12/19 18:06


Blood Blood Culture - Preliminary


Staphylococcus Sp Coag Neg Resulted


 


 3/12/19 18:06


Blood Blood Culture - Preliminary


NO GROWTH AFTER 48 HOURS Resulted


 


 3/13/19 04:00


Nasal Nares MRSA Culture - Final


NO METHICILLIN RESISTANT STAPH AUREUS... Complete


 


 3/12/19 18:06


Nasal Nares Influenza Types A,B Antigen (ANNIA) - Final Complete


 


 3/12/19 18:06


Urine,Clean Catch Urine Culture - Final


Escherichia Coli Complete


 


 3/13/19 04:00


Rectum VRE Culture - Final


NO VANCOMYCIN RESISTANT ENTEROCOCCUS ... Complete








Laboratory Tests


3/15/19 05:00: 


White Blood Count 8.1, Red Blood Count 4.53, Hemoglobin 12.2, Hematocrit 38.0, 

Mean Corpuscular Volume 84, Mean Corpuscular Hemoglobin 26.9L, Mean Corpuscular 

Hemoglobin Concent 32.1, Red Cell Distribution Width 14.9H, Platelet Count 91L, 

Mean Platelet Volume 10.0, Neutrophils (%) (Auto) , Lymphocytes (%) (Auto) , 

Monocytes (%) (Auto) , Eosinophils (%) (Auto) , Basophils (%) (Auto) , 

Prothrombin Time 22.4H, Prothromb Time International Ratio 2.2H, Sodium Level 

142, Potassium Level 3.3L, Chloride Level 107, Carbon Dioxide Level 27, Anion 

Gap 8, Blood Urea Nitrogen 35H, Creatinine 1.1, Estimat Glomerular Filtration 

Rate , Glucose Level 137H, Calcium Level 8.8, Magnesium Level 1.6L, Pro-B-Type 

Natriuretic Peptide 1477H





Current Medications








 Medications


  (Trade)  Dose


 Ordered  Sig/Toribio


 Route


 PRN Reason  Start Time


 Stop Time Status Last Admin


Dose Admin


 


 Azithromycin 500


 mg/Dextrose  275 ml @ 


 275 mls/hr  Q24HRS


 IV


   3/14/19 18:30


 3/20/19 19:29  3/14/19 18:56


 


 


 Ceftriaxone


 Sodium 1 gm/


 Dextrose  55 ml @ 


 110 mls/hr  Q24H


 IVPB


   3/14/19 18:00


 3/21/19 17:59  3/14/19 18:21


 


 


 Chlorhexidine


 Gluconate


  (Hanna-Hex 2%)  1 applic  DAILY@2000


 TOPIC


   3/14/19 20:00


 4/13/19 19:59  3/14/19 20:08


 


 


 Dextrose


  (Dextrose 50%)  25 ml  Q30M  PRN


 IV


 Hypoglycemia  3/12/19 22:45


 4/11/19 22:44   


 


 


 Dextrose


  (Dextrose 50%)  50 ml  Q30M  PRN


 IV


 Hypoglycemia  3/12/19 22:45


 4/11/19 22:44   


 


 


 Dopamine HCl/


 Dextrose  250 ml @ 0


 mls/hr  Q24H


 IV


   3/12/19 21:00


 4/11/19 20:59  3/12/19 21:30


 


 


 Furosemide


  (Lasix)  40 mg  EVERY 12  HOURS


 IV


   3/13/19 09:00


 4/12/19 08:59  3/15/19 09:42


 


 


 Ondansetron HCl


  (Zofran)  4 mg  Q6H  PRN


 IVP


 Nausea & Vomiting  3/12/19 22:45


 4/11/19 22:44   


 


 


 Potassium Chloride


  (K-Dur)  20 meq  DAILY


 NGT


   3/13/19 09:00


 4/12/19 08:59  3/15/19 09:42


 

















Nigel Aguayo MD Mar 15, 2019 13:51

## 2019-03-15 NOTE — CONSULTATION
DATE OF CONSULTATION:  03/14/2019

CONSULTING PHYSICIAN:  Misbah Francis M.D.



REQUESTING PHYSICIAN:  Dr. Presley Lomax, Dr. Williamson.



REASON FOR CONSULTATION:  Coagulopathy and hypotension.



HISTORY OF PRESENT ILLNESS:  This is an 88-year-old female resides at a

skilled nursing facility.  She was brought into the emergency room with

shortness of breath and low blood pressure.  She was started on pressors

and noted to have signs of acute congestive heart failure.  She

subsequently required intubation and mechanical ventilation.



PAST MEDICAL HISTORY:  Hypertension, coronary artery disease, cerebral

aneurysm, dementia, and history of DVT.



ALLERGIES:  None.



MEDICATIONS:  Reviewed.



SOCIAL HISTORY:  Nonsmoker.  No alcohol use.



REVIEW OF SYSTEMS:  Not obtainable from the patient.



PHYSICAL EXAMINATION:

VITAL SIGNS:  Blood pressure 153/67, pulse 67, respiratory rate 19.

LUNGS:  Orally intubated.  Bilateral breath sounds.  Scattered rhonchi.

HEART:  Regular rhythm and rate.  Normal S1 and S2.

ABDOMEN:  Soft.

EXTREMITIES:  No edema.



LABORATORY DATA:  INR is greater than 10.  ABG 7.38, 37, ___.  White count

8.6 and hemoglobin 11.5.  Sodium 141, potassium 3.8, bicarbonate 26, BUN

32, and creatinine 1.1.  Troponin is negative.  Echocardiogram revealed

normal ejection fraction, mild aortic stenosis with aortic valve area of

1.2.



IMPRESSION:

1. Aspiration pneumonia.

2. Respiratory failure.

3. Acute on chronic diastolic congestive heart failure.

4. Sepsis with shock, now recovering.

5. Cerebrovascular disease with history of cerebrovascular accident due

to aneurysm.

6. Acute DVT.



PLAN:

1. Antimicrobials.

2. Respiratory hygiene.

3. Weaning efforts.

4. Ventilator support for now.

5. Stress ulcer prophylaxis.

6. Reverse coagulopathy.

7. Bleeding precautions.

8. Diuresis.

9. Resume full anticoagulation for INR 2-3.









  ______________________________________________

  Misbah Francis M.D.





DRKristopher CARVALHO

D:  03/15/2019 01:57

T:  03/15/2019 02:47

JOB#:  [id number]

CC:



[place]

## 2019-03-15 NOTE — DIAGNOSTIC IMAGING REPORT
Indication: Dyspnea

 

Technique: One view of the chest

 

Comparison: 3/12/2019

 

Findings: Stable satisfactory positions of endotracheal tube and left subclavian

central venous catheter. There has been interim placement of an endotracheal tube,

tip projected beyond the edge of image but probably within the gastric antrum. There

has been interim clearing of previously demonstrated hazy left basilar opacity, lungs

and pleural spaces currently clear. There is mild central bronchial wall thickening

and central hilar vascular prominence again demonstrated.

 

Impression: Since 3/12/2019, interim clearing of previously demonstrated left basilar

hazy parenchymal disease and/or pleural fluid

 

Satisfactory nasogastric intubation

 

Stable tube and line positions otherwise

## 2019-03-16 NOTE — PULMONOLGY CRITICAL CARE NOTE
Critical Care - Asmt/Plan


Assessment/Plan:


Assessment/Plan


1. Congestive heart failure.


2. Acute respiratory failure on vent


3. Hypotension.


4. Pneumonia.





weaning again today


diruesis as toelrated


nebs and suction


c/s sputum


abx


disc w RN, RT


abg if tolerating ps of 8


cxr monday


Respiratory:  CXR, ABG, weaning trial


Cardiac:  continue to monitor HR/BP


Renal:  keep IV fluid


Infectious Disease:  continue antibiotics


Endocrine:  check TSH


Affect:  PRN ativan


Time Spent (Minutes):  60


Notes Reviewed:  internist, cardio


Discussed with:  nurses





Critical Care - Objective





Last 24 Hour Vital Signs








  Date Time  Temp Pulse Resp B/P (MAP) Pulse Ox O2 Delivery O2 Flow Rate FiO2


 


3/16/19 17:05  68 18     40


 


3/16/19 17:00  68 18 142/63 (89) 99   


 


3/16/19 16:00  92      


 


3/16/19 16:00        40


 


3/16/19 16:00      Mechanical Ventilator  





      Mechanical Ventilator  


 


3/16/19 16:00 98.7 63 16 120/65 (83) 98   


 


3/16/19 15:00  63 16 93/49 (64) 98   


 


3/16/19 14:49  66 16     40


 


3/16/19 14:16        40


 


3/16/19 14:15        40


 


3/16/19 14:00  76 29 158/67 (97) 97   


 


3/16/19 13:05  67 32     40


 


3/16/19 13:00  74 21 150/63 (92) 100   


 


3/16/19 12:40        40


 


3/16/19 12:00        40


 


3/16/19 12:00  62      


 


3/16/19 12:00      Mechanical Ventilator  





      Mechanical Ventilator  


 


3/16/19 12:00 98.4 68 30 155/49 (84) 97   


 


3/16/19 11:00  67 29 145/58 (87) 98   


 


3/16/19 10:50        40


 


3/16/19 10:48  63 29     40


 


3/16/19 10:00  71 29 163/61 (95) 99   


 


3/16/19 09:12  65 29     40


 


3/16/19 09:10        40


 


3/16/19 09:08     100   


 


3/16/19 09:00  61 17 152/56 (88) 100   


 


3/16/19 08:00      Mechanical Ventilator  





      Mechanical Ventilator  


 


3/16/19 08:00        40


 


3/16/19 08:00 98.6 52 16 131/52 (78) 100   


 


3/16/19 08:00  72      


 


3/16/19 07:45  57 16     40


 


3/16/19 07:00  58 16 155/58 (90) 99   


 


3/16/19 06:00  56 22 131/55 (80) 100   


 


3/16/19 05:11  60 20     40


 


3/16/19 05:00  59 22 160/59 (92) 100   


 


3/16/19 04:00  63      


 


3/16/19 04:00        40


 


3/16/19 04:00 98.1 61 20 149/61 (90) 99   


 


3/16/19 04:00      Mechanical Ventilator  





      Mechanical Ventilator  


 


3/16/19 03:00  62 22 151/57 (88) 100   


 


3/16/19 02:56  70 24     40


 


3/16/19 02:00  61 21 154/61 (92) 100   


 


3/16/19 01:03  66 19     40


 


3/16/19 01:00  65 20 156/52 (86) 100   


 


3/16/19 00:00  56      


 


3/16/19 00:00 98.0 61 19 137/55 (82) 99   


 


3/16/19 00:00      Mechanical Ventilator  





      Mechanical Ventilator  


 


3/15/19 23:00  61 20 146/55 (85) 100   


 


3/15/19 23:00  57 17     40


 


3/15/19 22:00  84 21 157/57 (90) 100   


 


3/15/19 21:15  50 16     40


 


3/15/19 21:00  51 16 111/51 (71) 95   


 


3/15/19 20:00  64      


 


3/15/19 20:00  67 22 151/62 (91) 100   


 


3/15/19 20:00        40


 


3/15/19 20:00      Mechanical Ventilator  





      Mechanical Ventilator  


 


3/15/19 19:25  59 16     40


 


3/15/19 19:00 98.0 58 16 130/56 (80) 99   


 


3/15/19 18:07  65 19 164/56 (92) 100   


 


3/15/19 18:00  62 18 175/61 (99) 100   








Status:  awake


Condition:  improving


Lungs:  rhonchi


Heart:  HR/BP stable


Abdomen:  soft, non-tender


Extremities:  edema


Objective:





Current Medications








 Medications


  (Trade)  Dose


 Ordered  Sig/Toribio


 Route


 PRN Reason  Start Time


 Stop Time Status Last Admin


Dose Admin


 


 Azithromycin 500


 mg/Dextrose  275 ml @ 


 275 mls/hr  Q24HRS


 IV


   3/14/19 18:30


 3/20/19 19:29  3/16/19 17:31


 


 


 Ceftriaxone


 Sodium 1 gm/


 Dextrose  55 ml @ 


 110 mls/hr  Q24H


 IVPB


   3/14/19 18:00


 3/21/19 17:59  3/16/19 17:31


 


 


 Chlorhexidine


 Gluconate


  (Hanna-Hex 2%)  1 applic  DAILY@2000


 TOPIC


   3/14/19 20:00


 4/13/19 19:59  3/15/19 20:46


 


 


 Dextrose


  (Dextrose 50%)  25 ml  Q30M  PRN


 IV


 Hypoglycemia  3/12/19 22:45


 4/11/19 22:44   


 


 


 Dextrose


  (Dextrose 50%)  50 ml  Q30M  PRN


 IV


 Hypoglycemia  3/12/19 22:45


 4/11/19 22:44   


 


 


 Furosemide


  (Lasix)  40 mg  DAILY


 IV


   3/16/19 09:00


 4/15/19 08:59  3/16/19 09:40


 


 


 Magnesium Oxide


  (Mag-Ox 400mg)  400 mg  THREE TIMES A  DAY


 ORAL


   3/16/19 18:00


 4/15/19 17:59  3/16/19 18:29


 


 


 Ondansetron HCl


  (Zofran)  4 mg  Q6H  PRN


 IVP


 Nausea & Vomiting  3/12/19 22:45


 4/11/19 22:44   


 


 


 Potassium Chloride


  (K-Dur)  20 meq  TID


 NGT


   3/16/19 18:00


 4/15/19 17:59  3/16/19 18:29


 








Laboratory Tests








Test


  3/16/19


06:06 3/16/19


08:45 3/16/19


11:35 3/16/19


14:20


 


Sodium Level


  143 MMOL/L


(136-145) 


  


  


 


 


Potassium Level


  3.3 MMOL/L


(3.5-5.1)  L 


  


  


 


 


Chloride Level


  108 MMOL/L


()  H 


  


  


 


 


Carbon Dioxide Level


  31 MMOL/L


(21-32) 


  


  


 


 


Anion Gap


  4 mmol/L


(5-15)  L 


  


  


 


 


Blood Urea Nitrogen


  36 mg/dL


(7-18)  H 


  


  


 


 


Creatinine


  0.9 MG/DL


(0.55-1.30) 


  


  


 


 


Estimat Glomerular Filtration


Rate  mL/min (>60)  


  


  


  


 


 


Glucose Level


  142 MG/DL


()  H 


  


  


 


 


Calcium Level


  8.9 MG/DL


(8.5-10.1) 


  


  


 


 


Total Bilirubin


  0.5 MG/DL


(0.2-1.0) 


  


  


 


 


Aspartate Amino Transf


(AST/SGOT) 53 U/L (15-37)


H 


  


  


 


 


Alanine Aminotransferase


(ALT/SGPT) 37 U/L (12-78)


  


  


  


 


 


Alkaline Phosphatase


  59 U/L


() 


  


  


 


 


Total Protein


  7.3 G/DL


(6.4-8.2) 


  


  


 


 


Albumin


  2.8 G/DL


(3.4-5.0)  L 


  


  


 


 


Globulin 4.5 g/dL     


 


Albumin/Globulin Ratio


  0.6 (1.0-2.7)


L 


  


  


 


 


Thyroid Stimulating Hormone


(TSH) 0.327 uiU/mL


(0.358-3.740) 


  


  


 


 


Arterial Blood pH


  


  7.464


(7.350-7.450) 7.465


(7.350-7.450) 7.451


(7.350-7.450)


 


Arterial Blood Partial


Pressure CO2 


  39.2 mmHg


(35.0-45.0) 43.3 mmHg


(35.0-45.0) 46.4 mmHg


(35.0-45.0)  H


 


Arterial Blood Partial


Pressure O2 


  108.7 mmHg


(75.0-100.0)  H 93.5 mmHg


(75.0-100.0) 79.8 mmHg


(75.0-100.0)


 


Arterial Blood HCO3


  


  27.5 mmol/L


(22.0-26.0)  H 30.6 mmol/L


(22.0-26.0)  H 31.6 mmol/L


(22.0-26.0)  H


 


Arterial Blood Oxygen


Saturation 


  97.5 %


() 97.1 %


() 95.6 %


()


 


Arterial Blood Base Excess  3.6 (-2-2)  H 6.1 (-2-2)  H 6.7 (-2-2)  H


 


Joaquin Test  Positive   Positive   Positive  











Critical Care - Subjective


ROS Limited/Unobtainable:  Yes


Condition:  critical


EKG Rhythm:  Sinus Rhythm


FI02:  40


Vent Support Breath Rate:  16


Vent Support Mode:  AC


Vent Tidal Volume:  500


Sputum Amount:  Small


PEEP:  0.0


PIP:  23


Tube Feeding Amount:  50


I&O:











Intake and Output  


 


 3/15/19 3/16/19





 19:00 07:00


 


Intake Total 410 ml 805 ml


 


Output Total 775 ml 420 ml


 


Balance -365 ml 385 ml


 


  


 


IV Total 210 ml 275 ml


 


Tube Feeding 200 ml 530 ml


 


Output Urine Total 775 ml 420 ml








CXR:


3/15 improved binfiltrates effusions


ET-Tube:  7.5


ET Position:  20


Labs:


weaned dotday to ps of 8 for 4 hours then back on ac rr incrased to 30's 


no cp nv or bleeding


toleratign tf


no fever noted


minimal secretions 


no pressors











Melyssa Reinoso DO Mar 16, 2019 17:56

## 2019-03-16 NOTE — PROGRESS NOTE
DATE:  03/15/2019

CARDIOLOGY PROGRESS NOTE



SUBJECTIVE:  The patient remains on ventilator support.  Weaning parameters

have been poor.



OBJECTIVE:

VITAL SIGNS:  Blood pressure 99/45 to 165/53, heart rate 50 to 70,

respiratory rate 18 to 20.

GENERAL:  The patient remains afebrile.

LUNGS:  Reveals coarse breath sounds and rhonchi.

HEART:  Regular rhythm.  Slow rate.  Normal S1, S2.

ABDOMEN:  Soft.

EXTREMITIES:  No edema.  Left weakness.



LABORATORY AND DIAGNOSTIC DATA:  White count 8.1, hemoglobin 12.2.

Potassium 3.3, magnesium 1.6.  BUN 35 and creatinine 1.1.  Pro-natriuretic

peptide is 1477.



IMPRESSION:

1. Respiratory failure.

2. Acute on chronic diastolic congestive heart failure.

3. Hypokalemia.

4. Hypomagnesemia.

5. History of cerebrovascular accident due to aneurysm.

6. Labile blood pressure with episodes of hypotension.

7. Aspiration pneumonia.

8. Recovering sepsis.



PLAN:

1. Check blood gas.

2. Check thyroid panel.

3. Replace potassium and magnesium with lot of intravenous fluids.

4. Decrease diuretic dosing with hold parameters for low range blood

pressure, remains tenuous and high risk.









  ______________________________________________

  Misbah Francis M.D. DR:  [place]

D:  03/15/2019 20:35

T:  03/15/2019 23:51

JOB#:  [id number]

CC:

## 2019-03-16 NOTE — GENERAL PROGRESS NOTE
Assessment/Plan


Problem List:  


(1) Hypomagnesemia


ICD Codes:  E83.42 - Hypomagnesemia


SNOMED:  397833845


(2) HCAP (healthcare-associated pneumonia)


ICD Codes:  J18.9 - Pneumonia, unspecified organism


SNOMED:  786878877


(3) Dyspnea


ICD Codes:  R06.00 - Dyspnea, unspecified


SNOMED:  393260258


(4) Hypokalemia


ICD Codes:  E87.6 - Hypokalemia


SNOMED:  04548615


(5) Alzheimer's dementia


ICD Codes:  G30.9 - Alzheimer's disease, unspecified


SNOMED:  38964680


(6) HTN (hypertension)


ICD Codes:  I10 - Essential (primary) hypertension


SNOMED:  46838636


(7) CVA (cerebral vascular accident)


ICD Codes:  I63.9 - Cerebral infarction, unspecified


SNOMED:  639977663


(8) Respiratory distress


ICD Codes:  R06.00 - Dyspnea, unspecified


SNOMED:  636167818


(9) Peripheral edema


ICD Codes:  R60.9 - Edema, unspecified


SNOMED:  960399891, 401206125


(10) Congestive heart failure (CHF)


ICD Codes:  I50.9 - Heart failure, unspecified


SNOMED:  40308976


(11) Respiratory failure


ICD Codes:  J96.90 - Respiratory failure, unspecified, unspecified whether with 

hypoxia or hypercapnia


SNOMED:  938957687, 492564063


(12) Cellulitis of leg


ICD Codes:  L03.119 - Cellulitis of unspecified part of limb


SNOMED:  786600035


Assessment/Plan


still on vent, peep, frail  cxr better, continue iv antibiotics, replace K, Mg, 

titration cardiac meds, full code per daughter





Subjective


ROS Limited/Unobtainable:  Yes


Allergies:  


Coded Allergies:  


     No Known Allergies (Unverified , 10/4/16)





Objective





Last 24 Hour Vital Signs








  Date Time  Temp Pulse Resp B/P (MAP) Pulse Ox O2 Delivery O2 Flow Rate FiO2


 


3/16/19 17:05  68 18     40


 


3/16/19 17:00  68 18 142/63 (89) 99   


 


3/16/19 16:00  92      


 


3/16/19 16:00        40


 


3/16/19 16:00      Mechanical Ventilator  





      Mechanical Ventilator  


 


3/16/19 16:00 98.7 63 16 120/65 (83) 98   


 


3/16/19 15:00  63 16 93/49 (64) 98   


 


3/16/19 14:49  66 16     40


 


3/16/19 14:16        40


 


3/16/19 14:15        40


 


3/16/19 14:00  76 29 158/67 (97) 97   


 


3/16/19 13:05  67 32     40


 


3/16/19 13:00  74 21 150/63 (92) 100   


 


3/16/19 12:40        40


 


3/16/19 12:00        40


 


3/16/19 12:00  62      


 


3/16/19 12:00      Mechanical Ventilator  





      Mechanical Ventilator  


 


3/16/19 12:00 98.4 68 30 155/49 (84) 97   


 


3/16/19 11:00  67 29 145/58 (87) 98   


 


3/16/19 10:50        40


 


3/16/19 10:48  63 29     40


 


3/16/19 10:00  71 29 163/61 (95) 99   


 


3/16/19 09:12  65 29     40


 


3/16/19 09:10        40


 


3/16/19 09:08     100   


 


3/16/19 09:00  61 17 152/56 (88) 100   


 


3/16/19 08:00      Mechanical Ventilator  





      Mechanical Ventilator  


 


3/16/19 08:00        40


 


3/16/19 08:00 98.6 52 16 131/52 (78) 100   


 


3/16/19 08:00  72      


 


3/16/19 07:45  57 16     40


 


3/16/19 07:00  58 16 155/58 (90) 99   


 


3/16/19 06:00  56 22 131/55 (80) 100   


 


3/16/19 05:11  60 20     40


 


3/16/19 05:00  59 22 160/59 (92) 100   


 


3/16/19 04:00  63      


 


3/16/19 04:00        40


 


3/16/19 04:00 98.1 61 20 149/61 (90) 99   


 


3/16/19 04:00      Mechanical Ventilator  





      Mechanical Ventilator  


 


3/16/19 03:00  62 22 151/57 (88) 100   


 


3/16/19 02:56  70 24     40


 


3/16/19 02:00  61 21 154/61 (92) 100   


 


3/16/19 01:03  66 19     40


 


3/16/19 01:00  65 20 156/52 (86) 100   


 


3/16/19 00:00  56      


 


3/16/19 00:00 98.0 61 19 137/55 (82) 99   


 


3/16/19 00:00      Mechanical Ventilator  





      Mechanical Ventilator  


 


3/15/19 23:00  61 20 146/55 (85) 100   


 


3/15/19 23:00  57 17     40


 


3/15/19 22:00  84 21 157/57 (90) 100   


 


3/15/19 21:15  50 16     40


 


3/15/19 21:00  51 16 111/51 (71) 95   


 


3/15/19 20:00  64      


 


3/15/19 20:00  67 22 151/62 (91) 100   


 


3/15/19 20:00        40


 


3/15/19 20:00      Mechanical Ventilator  





      Mechanical Ventilator  


 


3/15/19 19:25  59 16     40


 


3/15/19 19:00 98.0 58 16 130/56 (80) 99   


 


3/15/19 18:07  65 19 164/56 (92) 100   


 


3/15/19 18:00  62 18 175/61 (99) 100   

















Intake and Output  


 


 3/15/19 3/16/19





 19:00 07:00


 


Intake Total 410 ml 805 ml


 


Output Total 775 ml 420 ml


 


Balance -365 ml 385 ml


 


  


 


IV Total 210 ml 275 ml


 


Tube Feeding 200 ml 530 ml


 


Output Urine Total 775 ml 420 ml








Laboratory Tests


3/16/19 06:06: 


Sodium Level 143, Potassium Level 3.3L, Chloride Level 108H, Carbon Dioxide 

Level 31, Anion Gap 4L, Blood Urea Nitrogen 36H, Creatinine 0.9, Estimat 

Glomerular Filtration Rate , Glucose Level 142H, Calcium Level 8.9, Total 

Bilirubin 0.5, Aspartate Amino Transf (AST/SGOT) 53H, Alanine Aminotransferase (

ALT/SGPT) 37, Alkaline Phosphatase 59, Total Protein 7.3, Albumin 2.8L, 

Globulin 4.5, Albumin/Globulin Ratio 0.6L, Thyroid Stimulating Hormone (TSH) 

0.327L


3/16/19 08:45: 


Arterial Blood pH 7.464H, Arterial Blood Partial Pressure CO2 39.2, Arterial 

Blood Partial Pressure O2 108.7H, Arterial Blood HCO3 27.5H, Arterial Blood 

Oxygen Saturation 97.5, Arterial Blood Base Excess 3.6H, Joaquin Test Positive


3/16/19 11:35: 


Arterial Blood pH 7.465H, Arterial Blood Partial Pressure CO2 43.3, Arterial 

Blood Partial Pressure O2 93.5, Arterial Blood HCO3 30.6H, Arterial Blood 

Oxygen Saturation 97.1, Arterial Blood Base Excess 6.1H, Joaquin Test Positive


3/16/19 14:20: 


Arterial Blood pH 7.451H, Arterial Blood Partial Pressure CO2 46.4H, Arterial 

Blood Partial Pressure O2 79.8, Arterial Blood HCO3 31.6H, Arterial Blood 

Oxygen Saturation 95.6, Arterial Blood Base Excess 6.7H, Joaquin Test Positive


Height (Feet):  5


Height (Inches):  6.00


Weight (Pounds):  182


General Appearance:  mild distress, other - awake weak on vent


EENT:  normal ENT inspection


Neck:  normal alignment


Cardiovascular:  regular rhythm


Respiratory/Chest:  accessory muscle use, rhonchi - bilaterally


Abdomen:  non tender, soft


Edema:  1+ Leg (L), 1+ Leg (R)


Neurologic:  CNs II-XII grossly normal


Skin:  other - cellulitis legs











Presley Lomax MD Mar 16, 2019 17:59

## 2019-03-17 NOTE — PULMONOLGY CRITICAL CARE NOTE
Critical Care - Asmt/Plan


Assessment/Plan:


Assessment/Plan


1. Congestive heart failure.


2. Acute respiratory failure on vent


3. Hypotension.


4. Pneumonia.





weaning again today


diuresis as tolerated


nebs and suction


FU cultures


abx


disc w RN, RT


abg if tolerating ps of 8


cxr pendign this am


Respiratory:  CXR, ABG, weaning trial


Cardiac:  continue to monitor HR/BP


Infectious Disease:  check cultures, continue antibiotics


Prophylaxis:  Protonix, Heparin


Time Spent (Minutes):  40


Notes Reviewed:  internist


Discussed with:  nurses





Critical Care - Objective





Last 24 Hour Vital Signs








  Date Time  Temp Pulse Resp B/P (MAP) Pulse Ox O2 Delivery O2 Flow Rate FiO2


 


3/17/19 06:00  65 18 136/52 (80) 97   


 


3/17/19 05:39  64 16     35





        40


 


3/17/19 05:00  56 16 120/55 (76) 95   


 


3/17/19 04:00  66      


 


3/17/19 04:00      Mechanical Ventilator  





      Mechanical Ventilator  


 


3/17/19 04:00        35


 


3/17/19 04:00  56 16 114/49 (70) 96   


 


3/17/19 03:28  61 16     35





        40


 


3/17/19 03:00  63 17 150/55 (86) 97   


 


3/17/19 02:00  64 17 145/64 (91) 96   


 


3/17/19 01:23  66 21     35





        40


 


3/17/19 01:00  69 18 118/70 (86) 98   


 


3/17/19 00:00  54      


 


3/17/19 00:00      Mechanical Ventilator  





      Mechanical Ventilator  


 


3/17/19 00:00        35


 


3/17/19 00:00  63 17 156/54 (88) 98   


 


3/16/19 23:03  64 17     35





        40


 


3/16/19 23:00  58 16 137/53 (81) 95   


 


3/16/19 22:00  65 20 150/62 (91) 98   


 


3/16/19 21:00  66 17 111/51 (71) 97   


 


3/16/19 20:53  58 16     35





        40


 


3/16/19 20:00  66      


 


3/16/19 20:00      Mechanical Ventilator  





      Mechanical Ventilator  


 


3/16/19 20:00        35


 


3/16/19 20:00  65 19 128/56 (80) 97   


 


3/16/19 19:27  62 23     35





        40


 


3/16/19 19:00  68 20 128/62 (84) 97   


 


3/16/19 18:00  65 16 94/49 (64) 96   


 


3/16/19 17:05  68 18     40


 


3/16/19 17:00  68 18 142/63 (89) 99   


 


3/16/19 16:00  92      


 


3/16/19 16:00        40


 


3/16/19 16:00      Mechanical Ventilator  





      Mechanical Ventilator  


 


3/16/19 16:00 98.7 63 16 120/65 (83) 98   


 


3/16/19 15:00  63 16 93/49 (64) 98   


 


3/16/19 14:49  66 16     40


 


3/16/19 14:16        40


 


3/16/19 14:15        40


 


3/16/19 14:00  76 29 158/67 (97) 97   


 


3/16/19 13:05  67 32     40


 


3/16/19 13:00  74 21 150/63 (92) 100   


 


3/16/19 12:40        40


 


3/16/19 12:00        40


 


3/16/19 12:00  62      


 


3/16/19 12:00      Mechanical Ventilator  





      Mechanical Ventilator  


 


3/16/19 12:00 98.4 68 30 155/49 (84) 97   


 


3/16/19 11:00  67 29 145/58 (87) 98   


 


3/16/19 10:50        40


 


3/16/19 10:48  63 29     40


 


3/16/19 10:00  71 29 163/61 (95) 99   


 


3/16/19 09:12  65 29     40


 


3/16/19 09:10        40


 


3/16/19 09:08     100   


 


3/16/19 09:00  61 17 152/56 (88) 100   


 


3/16/19 08:00      Mechanical Ventilator  





      Mechanical Ventilator  


 


3/16/19 08:00        40


 


3/16/19 08:00 98.6 52 16 131/52 (78) 100   


 


3/16/19 08:00  72      


 


3/16/19 07:45  57 16     40








Status:  awake


Condition:  improving


Lungs:  rhonchi


Heart:  HR/BP stable


Abdomen:  soft, non-tender


Extremities:  edema


Objective:





Current Medications








 Medications


  (Trade)  Dose


 Ordered  Sig/Toribio


 Route


 PRN Reason  Start Time


 Stop Time Status Last Admin


Dose Admin


 


 Azithromycin 500


 mg/Dextrose  275 ml @ 


 275 mls/hr  Q24HRS


 IV


   3/14/19 18:30


 3/20/19 19:29  3/16/19 17:31


 


 


 Ceftriaxone


 Sodium 1 gm/


 Dextrose  55 ml @ 


 110 mls/hr  Q24H


 IVPB


   3/14/19 18:00


 3/21/19 17:59  3/16/19 17:31


 


 


 Chlorhexidine


 Gluconate


  (Hanna-Hex 2%)  1 applic  DAILY@2000


 TOPIC


   3/14/19 20:00


 4/13/19 19:59  3/15/19 20:46


 


 


 Dextrose


  (Dextrose 50%)  25 ml  Q30M  PRN


 IV


 Hypoglycemia  3/12/19 22:45


 4/11/19 22:44   


 


 


 Dextrose


  (Dextrose 50%)  50 ml  Q30M  PRN


 IV


 Hypoglycemia  3/12/19 22:45


 4/11/19 22:44   


 


 


 Furosemide


  (Lasix)  40 mg  DAILY


 IV


   3/16/19 09:00


 4/15/19 08:59  3/16/19 09:40


 


 


 Magnesium Oxide


  (Mag-Ox 400mg)  400 mg  THREE TIMES A  DAY


 ORAL


   3/16/19 18:00


 4/15/19 17:59  3/16/19 18:29


 


 


 Ondansetron HCl


  (Zofran)  4 mg  Q6H  PRN


 IVP


 Nausea & Vomiting  3/12/19 22:45


 4/11/19 22:44   


 


 


 Potassium Chloride


  (K-Dur)  20 meq  TID


 NGT


   3/16/19 18:00


 4/15/19 17:59  3/16/19 18:29


 








Laboratory Tests








Test


  3/16/19


06:06 3/16/19


08:45 3/16/19


11:35 3/16/19


14:20


 


Sodium Level


  143 MMOL/L


(136-145) 


  


  


 


 


Potassium Level


  3.3 MMOL/L


(3.5-5.1)  L 


  


  


 


 


Chloride Level


  108 MMOL/L


()  H 


  


  


 


 


Carbon Dioxide Level


  31 MMOL/L


(21-32) 


  


  


 


 


Anion Gap


  4 mmol/L


(5-15)  L 


  


  


 


 


Blood Urea Nitrogen


  36 mg/dL


(7-18)  H 


  


  


 


 


Creatinine


  0.9 MG/DL


(0.55-1.30) 


  


  


 


 


Estimat Glomerular Filtration


Rate  mL/min (>60)  


  


  


  


 


 


Glucose Level


  142 MG/DL


()  H 


  


  


 


 


Calcium Level


  8.9 MG/DL


(8.5-10.1) 


  


  


 


 


Total Bilirubin


  0.5 MG/DL


(0.2-1.0) 


  


  


 


 


Aspartate Amino Transf


(AST/SGOT) 53 U/L (15-37)


H 


  


  


 


 


Alanine Aminotransferase


(ALT/SGPT) 37 U/L (12-78)


  


  


  


 


 


Alkaline Phosphatase


  59 U/L


() 


  


  


 


 


Total Protein


  7.3 G/DL


(6.4-8.2) 


  


  


 


 


Albumin


  2.8 G/DL


(3.4-5.0)  L 


  


  


 


 


Globulin 4.5 g/dL     


 


Albumin/Globulin Ratio


  0.6 (1.0-2.7)


L 


  


  


 


 


Thyroid Stimulating Hormone


(TSH) 0.327 uiU/mL


(0.358-3.740) 


  


  


 


 


Arterial Blood pH


  


  7.464


(7.350-7.450) 7.465


(7.350-7.450) 7.451


(7.350-7.450)


 


Arterial Blood Partial


Pressure CO2 


  39.2 mmHg


(35.0-45.0) 43.3 mmHg


(35.0-45.0) 46.4 mmHg


(35.0-45.0)  H


 


Arterial Blood Partial


Pressure O2 


  108.7 mmHg


(75.0-100.0)  H 93.5 mmHg


(75.0-100.0) 79.8 mmHg


(75.0-100.0)


 


Arterial Blood HCO3


  


  27.5 mmol/L


(22.0-26.0)  H 30.6 mmol/L


(22.0-26.0)  H 31.6 mmol/L


(22.0-26.0)  H


 


Arterial Blood Oxygen


Saturation 


  97.5 %


() 97.1 %


() 95.6 %


()


 


Arterial Blood Base Excess  3.6 (-2-2)  H 6.1 (-2-2)  H 6.7 (-2-2)  H


 


Joaquin Test  Positive   Positive   Positive  








Micro:





Microbiology








 Date/Time


Source Procedure


Growth Status


 


 


 3/16/19 03:00


Sputum Induced Gram Stain - Final Resulted


 


 3/16/19 03:00


Sputum Induced Sputum Culture


Pending Resulted











Critical Care - Subjective


ROS Limited/Unobtainable:  Yes


Condition:  improving


FI02:  35


Vent Support Breath Rate:  16


Vent Support Mode:  AC


Vent Tidal Volume:  500


Sputum Amount:  Small


PEEP:  0.0


PIP:  22


Tube Feeding Amount:  50


I&O:











Intake and Output  


 


 3/16/19 3/17/19





 19:00 07:00


 


Intake Total 1030 ml 550 ml


 


Output Total 2500 ml 360 ml


 


Balance -1470 ml 190 ml


 


  


 


Intake Free Water 100 ml 


 


IV Total 330 ml 


 


Tube Feeding 600 ml 550 ml


 


Output Urine Total 2500 ml 360 ml








Subjective:


awake


to wean today


no cp nv or bleeding


no fever


cxr pending this am


ET-Tube:  7.5


ET Position:  20


Labs:





Microbiology








 Date/Time


Source Procedure


Growth Status


 


 


 3/16/19 03:00


Sputum Induced Gram Stain - Final Resulted


 


 3/16/19 03:00


Sputum Induced Sputum Culture


Pending Resulted








Current Medications








 Medications


  (Trade)  Dose


 Ordered  Sig/Toribio


 Route


 PRN Reason  Start Time


 Stop Time Status Last Admin


Dose Admin


 


 Azithromycin 500


 mg/Dextrose  275 ml @ 


 275 mls/hr  Q24HRS


 IV


   3/14/19 18:30


 3/20/19 19:29  3/16/19 17:31


 


 


 Ceftriaxone


 Sodium 1 gm/


 Dextrose  55 ml @ 


 110 mls/hr  Q24H


 IVPB


   3/14/19 18:00


 3/21/19 17:59  3/16/19 17:31


 


 


 Chlorhexidine


 Gluconate


  (Hanna-Hex 2%)  1 applic  DAILY@2000


 TOPIC


   3/14/19 20:00


 4/13/19 19:59  3/16/19 20:11


 


 


 Dextrose


  (Dextrose 50%)  25 ml  Q30M  PRN


 IV


 Hypoglycemia  3/12/19 22:45


 4/11/19 22:44   


 


 


 Dextrose


  (Dextrose 50%)  50 ml  Q30M  PRN


 IV


 Hypoglycemia  3/12/19 22:45


 4/11/19 22:44   


 


 


 Furosemide


  (Lasix)  40 mg  DAILY


 IV


   3/16/19 09:00


 4/15/19 08:59  3/16/19 09:40


 


 


 Magnesium Oxide


  (Mag-Ox 400mg)  400 mg  THREE TIMES A  DAY


 ORAL


   3/16/19 18:00


 4/15/19 17:59  3/16/19 18:29


 


 


 Ondansetron HCl


  (Zofran)  4 mg  Q6H  PRN


 IVP


 Nausea & Vomiting  3/12/19 22:45


 4/11/19 22:44   


 


 


 Potassium Chloride


  (K-Dur)  20 meq  TID


 NGT


   3/16/19 18:00


 4/15/19 17:59  3/16/19 18:29


 


 


 Warfarin Sodium


  (Coumadin per


 pharmacy)  1 ea  DAILY  PRN


 MISC


 Per rx protocol  3/17/19 00:15


 4/16/19 00:14 UNV  


 


 


 Warfarin Sodium


  (Coumadin)  3 mg  ONCE  ONCE


 ORAL


   3/17/19 00:30


 3/17/19 00:31 UNV  


 








Laboratory Tests








Test


  3/16/19


08:45 3/16/19


11:35 3/16/19


14:20 3/17/19


05:28


 


Arterial Blood pH


  7.464


(7.350-7.450) 7.465


(7.350-7.450) 7.451


(7.350-7.450) 


 


 


Arterial Blood Partial


Pressure CO2 39.2 mmHg


(35.0-45.0) 43.3 mmHg


(35.0-45.0) 46.4 mmHg


(35.0-45.0)  H 


 


 


Arterial Blood Partial


Pressure O2 108.7 mmHg


(75.0-100.0)  H 93.5 mmHg


(75.0-100.0) 79.8 mmHg


(75.0-100.0) 


 


 


Arterial Blood HCO3


  27.5 mmol/L


(22.0-26.0)  H 30.6 mmol/L


(22.0-26.0)  H 31.6 mmol/L


(22.0-26.0)  H 


 


 


Arterial Blood Oxygen


Saturation 97.5 %


() 97.1 %


() 95.6 %


() 


 


 


Arterial Blood Base Excess 3.6 (-2-2)  H 6.1 (-2-2)  H 6.7 (-2-2)  H 


 


Joaquin Test Positive   Positive   Positive   


 


White Blood Count


  


  


  


  9.0 K/UL


(4.8-10.8)


 


Red Blood Count


  


  


  


  4.34 M/UL


(4.20-5.40)


 


Hemoglobin


  


  


  


  11.5 G/DL


(12.0-16.0)  L


 


Hematocrit


  


  


  


  36.1 %


(37.0-47.0)  L


 


Mean Corpuscular Volume    83 FL (80-99)  


 


Mean Corpuscular Hemoglobin


  


  


  


  26.4 PG


(27.0-31.0)  L


 


Mean Corpuscular Hemoglobin


Concent 


  


  


  31.8 G/DL


(32.0-36.0)  L


 


Red Cell Distribution Width


  


  


  


  14.7 %


(11.6-14.8)


 


Platelet Count


  


  


  


  104 K/UL


(150-450)  L


 


Mean Platelet Volume


  


  


  


  10.0 FL


(6.5-10.1)


 


Neutrophils (%) (Auto)


  


  


  


  77.8 %


(45.0-75.0)  H


 


Lymphocytes (%) (Auto)


  


  


  


  10.2 %


(20.0-45.0)  L


 


Monocytes (%) (Auto)


  


  


  


  11.6 %


(1.0-10.0)  H


 


Eosinophils (%) (Auto)


  


  


  


  0.2 %


(0.0-3.0)


 


Basophils (%) (Auto)


  


  


  


  0.2 %


(0.0-2.0)


 


Prothrombin Time


  


  


  


  11.7 SEC


(9.30-11.50)  H


 


Prothromb Time International


Ratio 


  


  


  1.1 (0.9-1.1)  


 


 


Sodium Level


  


  


  


  145 MMOL/L


(136-145)


 


Potassium Level


  


  


  


  3.2 MMOL/L


(3.5-5.1)  L


 


Chloride Level


  


  


  


  108 MMOL/L


()  H


 


Carbon Dioxide Level


  


  


  


  34 MMOL/L


(21-32)  H


 


Anion Gap


  


  


  


  3 mmol/L


(5-15)  L


 


Blood Urea Nitrogen


  


  


  


  34 mg/dL


(7-18)  H


 


Creatinine


  


  


  


  0.9 MG/DL


(0.55-1.30)


 


Estimat Glomerular Filtration


Rate 


  


  


   mL/min (>60)  


 


 


Glucose Level


  


  


  


  149 MG/DL


()  H


 


Calcium Level


  


  


  


  8.7 MG/DL


(8.5-10.1)


 


Magnesium Level


  


  


  


  1.9 MG/DL


(1.8-2.4)


 


Pro-B-Type Natriuretic Peptide


  


  


  


  2035 pg/mL


(0-125)  H

















Melyssa Reinoso DO Mar 17, 2019 07:02

## 2019-03-17 NOTE — GENERAL PROGRESS NOTE
Assessment/Plan


Problem List:  


(1) Hypomagnesemia


ICD Codes:  E83.42 - Hypomagnesemia


SNOMED:  721411845


(2) HCAP (healthcare-associated pneumonia)


ICD Codes:  J18.9 - Pneumonia, unspecified organism


SNOMED:  205650674


(3) Dyspnea


ICD Codes:  R06.00 - Dyspnea, unspecified


SNOMED:  880212071


(4) Hypokalemia


ICD Codes:  E87.6 - Hypokalemia


SNOMED:  69729579


(5) Alzheimer's dementia


ICD Codes:  G30.9 - Alzheimer's disease, unspecified


SNOMED:  12109592


(6) HTN (hypertension)


ICD Codes:  I10 - Essential (primary) hypertension


SNOMED:  00047964


(7) CVA (cerebral vascular accident)


ICD Codes:  I63.9 - Cerebral infarction, unspecified


SNOMED:  427612727


(8) Respiratory distress


ICD Codes:  R06.00 - Dyspnea, unspecified


SNOMED:  095388623


(9) Peripheral edema


ICD Codes:  R60.9 - Edema, unspecified


SNOMED:  357958626, 160190154


(10) Congestive heart failure (CHF)


ICD Codes:  I50.9 - Heart failure, unspecified


SNOMED:  68561589


(11) Respiratory failure


ICD Codes:  J96.90 - Respiratory failure, unspecified, unspecified whether with 

hypoxia or hypercapnia


SNOMED:  455326700, 496703889


(12) Cellulitis of leg


ICD Codes:  L03.119 - Cellulitis of unspecified part of limb


SNOMED:  812247527


Assessment/Plan


still on vent, cpap trials , frail  cxr better, continue iv antibiotics, 

replace K, Mg, titration cardiac meds, full code per daughter





Subjective


ROS Limited/Unobtainable:  Yes


Allergies:  


Coded Allergies:  


     No Known Allergies (Unverified , 10/4/16)





Objective





Last 24 Hour Vital Signs








  Date Time  Temp Pulse Resp B/P (MAP) Pulse Ox O2 Delivery O2 Flow Rate FiO2


 


3/17/19 10:30  74 32     30


 


3/17/19 10:00  79 27 105/46 (65) 96   


 


3/17/19 09:30        30


 


3/17/19 09:22     100   


 


3/17/19 09:13        30





        30


 


3/17/19 09:04  60 16     30





        


 


3/17/19 09:00  75 32 150/61 (90) 96   


 


3/17/19 08:00        30


 


3/17/19 08:00  63      


 


3/17/19 08:00      Mechanical Ventilator  





      Mechanical Ventilator  


 


3/17/19 08:00 98.5 64 16 136/50 (78) 97   


 


3/17/19 07:15  80 21     30





        


 


3/17/19 07:00  65 16 155/62 (93) 97   


 


3/17/19 06:00  65 18 136/52 (80) 97   


 


3/17/19 05:39  64 16     35





        40


 


3/17/19 05:00  56 16 120/55 (76) 95   


 


3/17/19 04:00  66      


 


3/17/19 04:00      Mechanical Ventilator  





      Mechanical Ventilator  


 


3/17/19 04:00        35


 


3/17/19 04:00  56 16 114/49 (70) 96   


 


3/17/19 03:28  61 16     35





        40


 


3/17/19 03:00  63 17 150/55 (86) 97   


 


3/17/19 02:00  64 17 145/64 (91) 96   


 


3/17/19 01:23  66 21     35





        40


 


3/17/19 01:00  69 18 118/70 (86) 98   


 


3/17/19 00:00  54      


 


3/17/19 00:00      Mechanical Ventilator  





      Mechanical Ventilator  


 


3/17/19 00:00        35


 


3/17/19 00:00  63 17 156/54 (88) 98   


 


3/16/19 23:03  64 17     35





        40


 


3/16/19 23:00  58 16 137/53 (81) 95   


 


3/16/19 22:00  65 20 150/62 (91) 98   


 


3/16/19 21:00  66 17 111/51 (71) 97   


 


3/16/19 20:53  58 16     35





        40


 


3/16/19 20:00  66      


 


3/16/19 20:00      Mechanical Ventilator  





      Mechanical Ventilator  


 


3/16/19 20:00        35


 


3/16/19 20:00  65 19 128/56 (80) 97   


 


3/16/19 19:27  62 23     35





        40


 


3/16/19 19:00  68 20 128/62 (84) 97   


 


3/16/19 18:00  65 16 94/49 (64) 96   


 


3/16/19 17:05  68 18     40


 


3/16/19 17:00  68 18 142/63 (89) 99   


 


3/16/19 16:00  92      


 


3/16/19 16:00        40


 


3/16/19 16:00      Mechanical Ventilator  





      Mechanical Ventilator  


 


3/16/19 16:00 98.7 63 16 120/65 (83) 98   


 


3/16/19 15:00  63 16 93/49 (64) 98   


 


3/16/19 14:49  66 16     40


 


3/16/19 14:16        40


 


3/16/19 14:15        40


 


3/16/19 14:00  76 29 158/67 (97) 97   


 


3/16/19 13:05  67 32     40


 


3/16/19 13:00  74 21 150/63 (92) 100   


 


3/16/19 12:40        40


 


3/16/19 12:00        40


 


3/16/19 12:00  62      


 


3/16/19 12:00      Mechanical Ventilator  





      Mechanical Ventilator  


 


3/16/19 12:00 98.4 68 30 155/49 (84) 97   


 


3/16/19 11:00  67 29 145/58 (87) 98   


 


3/16/19 10:50        40


 


3/16/19 10:48  63 29     40

















Intake and Output  


 


 3/16/19 3/17/19





 19:00 07:00


 


Intake Total 1030 ml 600 ml


 


Output Total 2500 ml 390 ml


 


Balance -1470 ml 210 ml


 


  


 


Intake Free Water 100 ml 


 


IV Total 330 ml 


 


Tube Feeding 600 ml 600 ml


 


Output Urine Total 2500 ml 390 ml








Laboratory Tests


3/16/19 11:35: 


Arterial Blood pH 7.465H, Arterial Blood Partial Pressure CO2 43.3, Arterial 

Blood Partial Pressure O2 93.5, Arterial Blood HCO3 30.6H, Arterial Blood 

Oxygen Saturation 97.1, Arterial Blood Base Excess 6.1H, Joaquin Test Positive


3/16/19 14:20: 


Arterial Blood pH 7.451H, Arterial Blood Partial Pressure CO2 46.4H, Arterial 

Blood Partial Pressure O2 79.8, Arterial Blood HCO3 31.6H, Arterial Blood 

Oxygen Saturation 95.6, Arterial Blood Base Excess 6.7H, Joaquin Test Positive


3/17/19 05:28: 


White Blood Count 9.0, Red Blood Count 4.34, Hemoglobin 11.5L, Hematocrit 36.1L

, Mean Corpuscular Volume 83, Mean Corpuscular Hemoglobin 26.4L, Mean 

Corpuscular Hemoglobin Concent 31.8L, Red Cell Distribution Width 14.7, 

Platelet Count 104L, Mean Platelet Volume 10.0, Neutrophils (%) (Auto) 77.8H, 

Lymphocytes (%) (Auto) 10.2L, Monocytes (%) (Auto) 11.6H, Eosinophils (%) (Auto

) 0.2, Basophils (%) (Auto) 0.2, Prothrombin Time 11.7H, Prothromb Time 

International Ratio 1.1, Sodium Level 145, Potassium Level 3.2L, Chloride Level 

108H, Carbon Dioxide Level 34H, Anion Gap 3L, Blood Urea Nitrogen 34H, 

Creatinine 0.9, Estimat Glomerular Filtration Rate , Glucose Level 149H, 

Calcium Level 8.7, Magnesium Level 1.9, Pro-B-Type Natriuretic Peptide 2035H


3/17/19 09:25: 


Arterial Blood pH 7.483H, Arterial Blood Partial Pressure CO2 44.7, Arterial 

Blood Partial Pressure O2 83.2, Arterial Blood HCO3 32.8H, Arterial Blood 

Oxygen Saturation 96.2, Arterial Blood Base Excess 8.3H, Joaquin Test Positive


Height (Feet):  5


Height (Inches):  6.00


Weight (Pounds):  181


General Appearance:  confused, other - awake on vent


EENT:  normal ENT inspection


Neck:  normal alignment


Cardiovascular:  normal rate, regular rhythm


Respiratory/Chest:  rhonchi - bilaterally


Abdomen:  non tender, soft


Edema:  1+ Leg (L); 2+ Leg (R)


Neurologic:  disoriented


Skin:  other - cellulitis leg











Presley Lomax MD Mar 17, 2019 10:47

## 2019-03-17 NOTE — DIAGNOSTIC IMAGING REPORT
EXAM:

  XR Chest, 1 View

 

CLINICAL HISTORY:

  ABN CHST

 

TECHNIQUE:

  Frontal view of the chest.

 

COMPARISON:

  Chest x-rays dated 3/15/19

 

FINDINGS:

  Lungs:  Unchanged appearance of increased interstitial markings.  The 

lungs are otherwise clear without focal consolidation.

  Pleural space:  Unremarkable.  The costophrenic angles are sharp.  No 

visible pneumothorax.

  Heart:  Unremarkable.  No cardiomegaly.

  Mediastinum:  Unremarkable.

  Bones/joints:  Degenerative changes throughout the visualized spine and 

shoulder joints.

  Vasculature:  Atherosclerotic calcifications within the aortic arch.

  Tubes, lines and devices:  Endotracheal tube tip 4.2 cm above the 

emre.  NG tube extends below the diaphragm and its tip is not 

visualized.

 

IMPRESSION:     

  Unchanged appearance of increased interstitial markings.  This is 

nonspecific but may suggest bronchitis or interstitial pneumonitis.  No 

focal consolidation.

## 2019-03-17 NOTE — PROGRESS NOTE
DATE:  03/16/2019

CARDIOLOGY PROGRESS NOTE



SUBJECTIVE:  The patient is on weaning efforts.  Continue to require

nebulizer therapy and aggressive suctioning.



OBJECTIVE:

VITAL SIGNS:  Blood pressure 142/63, pulse 68, respirations 18.

LUNGS:  Coarse breath sounds.  Scattered rhonchi.

HEART:  Regular rhythm and rate.  Normal S1, S2.

ABDOMEN:  Soft.

EXTREMITIES:  No edema.



Monitored rhythm, sinus with rare episodes of bradycardia.  Cultures of

the urine E. coli.  ABG, 7.45, 46, 80.  Sodium 143, potassium 3.3,

bicarbonate 31, BUN 36, creatinine 0.9.  Albumin 2.8.  Magnesium yesterday

1.6.



IMPRESSION:

1. Respiratory failure.

2. Hypokalemia.

3. Hypomagnesemia.

4. Dysphagia.

5. Aspiration pneumonia.

6. Cerebrovascular disease with left hemiparesis.

7. Hypertensive heart disease.

8. Acute on chronic diastolic congestive heart failure, improved.



PLAN:

1. Ventilator support.

2. Weaning as able.

3. Replace the electrolytes.

4. Diuresis based on clinical parameters.









  ______________________________________________

  Misbah Francis M.D.





DR:  CHRISTIANO

D:  03/17/2019 00:06

T:  03/17/2019 03:38

JOB#:  [id number]

CC:

## 2019-03-18 NOTE — GENERAL PROGRESS NOTE
Assessment/Plan


Problem List:  


(1) Hypomagnesemia


ICD Codes:  E83.42 - Hypomagnesemia


SNOMED:  264063103


(2) HCAP (healthcare-associated pneumonia)


ICD Codes:  J18.9 - Pneumonia, unspecified organism


SNOMED:  978301071


(3) Dyspnea


ICD Codes:  R06.00 - Dyspnea, unspecified


SNOMED:  546647927


(4) Hypokalemia


ICD Codes:  E87.6 - Hypokalemia


SNOMED:  75670736


(5) Alzheimer's dementia


ICD Codes:  G30.9 - Alzheimer's disease, unspecified


SNOMED:  43110274


(6) HTN (hypertension)


ICD Codes:  I10 - Essential (primary) hypertension


SNOMED:  93841193


(7) CVA (cerebral vascular accident)


ICD Codes:  I63.9 - Cerebral infarction, unspecified


SNOMED:  062313889


(8) Respiratory distress


ICD Codes:  R06.00 - Dyspnea, unspecified


SNOMED:  696799973


(9) Peripheral edema


ICD Codes:  R60.9 - Edema, unspecified


SNOMED:  874693768, 336408710


(10) Congestive heart failure (CHF)


ICD Codes:  I50.9 - Heart failure, unspecified


SNOMED:  18718361


(11) Respiratory failure


ICD Codes:  J96.90 - Respiratory failure, unspecified, unspecified whether with 

hypoxia or hypercapnia


SNOMED:  372617686, 533355336


(12) Cellulitis of leg


ICD Codes:  L03.119 - Cellulitis of unspecified part of limb


SNOMED:  627409730


Assessment/Plan


still on vent, cpap trials , failed 3/18 as tachypnea, anxious to add seroquel,

  frail  cxr better, continue iv antibiotics, replace K, Mg, titration cardiac 

meds, full code per daughter





Subjective


ROS Limited/Unobtainable:  Yes


Allergies:  


Coded Allergies:  


     No Known Allergies (Unverified , 10/4/16)





Objective





Last 24 Hour Vital Signs








  Date Time  Temp Pulse Resp B/P (MAP) Pulse Ox O2 Delivery O2 Flow Rate FiO2


 


3/18/19 18:00  70 18 159/65 (96) 95   


 


3/18/19 17:13  54 16     35


 


3/18/19 17:00  56 16 113/46 (68) 96   


 


3/18/19 16:00 99.2 71 18 167/69 (101) 97   


 


3/18/19 16:00      Mechanical Ventilator  





      Mechanical Ventilator  


 


3/18/19 16:00        35


 


3/18/19 16:00  57      


 


3/18/19 15:07  66 16     35


 


3/18/19 15:00  59 16 94/43 (60) 94   


 


3/18/19 14:00  70 19 165/66 (99) 96   


 


3/18/19 13:12  69 17     35


 


3/18/19 13:00  72 18 176/75 (108) 98   


 


3/18/19 12:00        35


 


3/18/19 12:00  59      


 


3/18/19 12:00      Mechanical Ventilator  





      Mechanical Ventilator  


 


3/18/19 12:00 99.0 55 16 106/46 (66) 96   


 


3/18/19 11:27  72 16     35


 


3/18/19 11:00  59 16 104/51 (68) 97   


 


3/18/19 10:11     100   


 


3/18/19 10:07  96 36     35


 


3/18/19 10:00  83 30 158/74 (102) 96   


 


3/18/19 09:35  78 32     30





        30


 


3/18/19 09:00  59 16 93/46 (62) 95   


 


3/18/19 08:00        35


 


3/18/19 08:00 98.9 66 16 144/64 (90) 93   


 


3/18/19 08:00      Mechanical Ventilator  





      Mechanical Ventilator  


 


3/18/19 08:00  56      


 


3/18/19 07:29  70 17     35


 


3/18/19 07:00  61 16 93/47 (62) 93   


 


3/18/19 06:00  75 16 106/47 (66) 97   


 


3/18/19 05:28  68 20     35


 


3/18/19 05:00  67 16 106/47 (66) 98   


 


3/18/19 04:00 98.6 63 16 154/48 (83) 96   


 


3/18/19 04:00        35


 


3/18/19 04:00      Mechanical Ventilator  





      Mechanical Ventilator  


 


3/18/19 03:22  91 18     35


 


3/18/19 03:09  68      


 


3/18/19 03:00  74 20 132/63 (86) 99   


 


3/18/19 02:00  69 16 156/58 (90) 100   


 


3/18/19 01:17  51 16     35


 


3/18/19 01:00  68 16 157/53 (87) 99   


 


3/18/19 00:00      Mechanical Ventilator  





      Mechanical Ventilator  


 


3/18/19 00:00        35


 


3/18/19 00:00 99.0 55 16 96/47 (63) 98   


 


3/17/19 23:27  67 19     35


 


3/17/19 23:05  66      


 


3/17/19 23:00  68 20 159/63 (95) 99   


 


3/17/19 22:00  63 18 158/57 (90) 97   


 


3/17/19 21:27  65 16     35


 


3/17/19 21:00  69 16 148/61 (90) 96   


 


3/17/19 20:00        35


 


3/17/19 20:00      Mechanical Ventilator  





      Mechanical Ventilator  


 


3/17/19 20:00 98.5 59 16 100/54 (69) 97   


 


3/17/19 19:41  72 16     35


 


3/17/19 19:16  68      


 


3/17/19 19:00  76 16 111/54 (73) 95   

















Intake and Output  


 


 3/17/19 3/18/19





 19:00 07:00


 


Intake Total 645 ml 915 ml


 


Output Total 1245 ml 1175 ml


 


Balance -600 ml -260 ml


 


  


 


Intake Free Water 150 ml 


 


IV Total 55 ml 275 ml


 


Tube Feeding 440 ml 640 ml


 


Output Urine Total 1245 ml 1175 ml








Laboratory Tests


3/18/19 04:25: 


Prothrombin Time 12.1H, Prothromb Time International Ratio 1.2H, Sodium Level 

147H, Potassium Level 3.1L, Chloride Level 105, Carbon Dioxide Level 35H, Anion 

Gap 7, Blood Urea Nitrogen 31H, Creatinine 0.8, Estimat Glomerular Filtration 

Rate , Glucose Level 134H, Calcium Level 8.9


Height (Feet):  5


Height (Inches):  6.00


Weight (Pounds):  171


General Appearance:  alert, mild distress


EENT:  normal ENT inspection


Neck:  normal alignment


Cardiovascular:  normal rate, regular rhythm


Respiratory/Chest:  rhonchi - bilaterally


Abdomen:  non tender, soft


Edema:  mild edema


Neurologic:  CNs II-XII grossly normal


Skin:  other - cellulitis r leg











Presley Lomax MD Mar 18, 2019 18:18

## 2019-03-18 NOTE — PROGRESS NOTE
DATE:  03/17/2019

CARDIOLOGY PROGRESS NOTE:



SUBJECTIVE:  The patient remains on vent.  Ongoing weaning trials.  She

continues on antimicrobials.  Secretions are decreasing in quantity.



OBJECTIVE:

VITAL SIGNS:  Blood pressure 148/61, pulse 69, respirations 16, afebrile.

LUNGS:  Coarse breath sounds.  Few rhonchi.

HEART:  Regular rhythm and rate.  Normal S1, S2.

ABDOMEN:  Soft.

EXTREMITIES:  No edema.

NEUROLOGIC:  Left-sided weakness.



LABORATORY AND DIAGNOSTIC DATA:  White count 9, hemoglobin 11.5.  ABG 7.48,

45, 83.  Sodium 145, potassium 3.2, magnesium 1.9, bicarb 34, BUN 34,

creatinine 0.9.  Pro-natriuretic peptide 2035.  INR 1.1.  Chest x-ray

today reveals interstitial increased interstitial markings.



IMPRESSION:

1. Coagulopathy, corrected.

2. CVA.

3. History of DVT and pulmonary embolism.

4. Respiratory failure.

5. Hypertensive heart disease.

6. Anemia.

7. Hypokalemia.

8. Acute on chronic diastolic congestive heart failure.



RECOMMENDATIONS:

1. Weaning efforts.

2. Warfarin to INR goal of 2 to 2.5.

3. Replace potassium.

4. Recheck magnesium as needed.

5. Cautious diuresis based on clinical parameters and trending of

natriuretic peptide assay.

6. Antimicrobials.

7. Stress ulcer prophylaxis.









  ______________________________________________

  Misbah Francis M.D.





DR:  ISAURO

D:  03/17/2019 22:53

T:  03/17/2019 23:54

JOB#:  [id number]

CC:

## 2019-03-18 NOTE — PULMONOLOGY PROGRESS NOTE
Assessment/Plan


Assessment/Plan


1. Congestive heart failure.


2. Acute respiratory failure.


3. Hypotension.


4. Pneumonia.





poor weaning parameters; I checked at bedside today


c/s sputum with GNR


cont abx, nebs


disc w RN


wean when better parameters





Subjective


ROS Limited/Unobtainable:  Yes


Allergies:  


Coded Allergies:  


     No Known Allergies (Unverified , 10/4/16)





Objective





Last 24 Hour Vital Signs








  Date Time  Temp Pulse Resp B/P (MAP) Pulse Ox O2 Delivery O2 Flow Rate FiO2


 


3/18/19 11:27  72 16     35


 


3/18/19 11:00  59 16 104/51 (68) 97   


 


3/18/19 10:11     100   


 


3/18/19 10:07  96 36     35


 


3/18/19 10:00  83 30 158/74 (102) 96   


 


3/18/19 09:35  78 32     30





        30


 


3/18/19 09:00  59 16 93/46 (62) 95   


 


3/18/19 08:00        35


 


3/18/19 08:00 98.9 66 16 144/64 (90) 93   


 


3/18/19 08:00      Mechanical Ventilator  





      Mechanical Ventilator  


 


3/18/19 08:00  56      


 


3/18/19 07:29  70 17     35


 


3/18/19 07:00  61 16 93/47 (62) 93   


 


3/18/19 06:00  75 16 106/47 (66) 97   


 


3/18/19 05:28  68 20     35


 


3/18/19 05:00  67 16 106/47 (66) 98   


 


3/18/19 04:00 98.6 63 16 154/48 (83) 96   


 


3/18/19 04:00        35


 


3/18/19 04:00      Mechanical Ventilator  





      Mechanical Ventilator  


 


3/18/19 03:22  91 18     35


 


3/18/19 03:09  68      


 


3/18/19 03:00  74 20 132/63 (86) 99   


 


3/18/19 02:00  69 16 156/58 (90) 100   


 


3/18/19 01:17  51 16     35


 


3/18/19 01:00  68 16 157/53 (87) 99   


 


3/18/19 00:00      Mechanical Ventilator  





      Mechanical Ventilator  


 


3/18/19 00:00        35


 


3/18/19 00:00 99.0 55 16 96/47 (63) 98   


 


3/17/19 23:27  67 19     35


 


3/17/19 23:05  66      


 


3/17/19 23:00  68 20 159/63 (95) 99   


 


3/17/19 22:00  63 18 158/57 (90) 97   


 


3/17/19 21:27  65 16     35


 


3/17/19 21:00  69 16 148/61 (90) 96   


 


3/17/19 20:00        35


 


3/17/19 20:00      Mechanical Ventilator  





      Mechanical Ventilator  


 


3/17/19 20:00 98.5 59 16 100/54 (69) 97   


 


3/17/19 19:41  72 16     35


 


3/17/19 19:16  68      


 


3/17/19 19:00  76 16 111/54 (73) 95   


 


3/17/19 18:00  65 17 152/64 (93) 95   


 


3/17/19 17:00 99.3 56 20 152/65 (94) 96   


 


3/17/19 16:53  81 21     35


 


3/17/19 16:00  64      


 


3/17/19 16:00      Mechanical Ventilator  





      Mechanical Ventilator  


 


3/17/19 16:00  56 16 145/59 (87) 94   


 


3/17/19 15:00  68 18 121/61 (81) 97   


 


3/17/19 14:30  71 17     35


 


3/17/19 14:00  64 20 105/53 (70) 93   


 


3/17/19 13:00  68 18 104/47 (66) 97   

















Intake and Output  


 


 3/17/19 3/18/19





 19:00 07:00


 


Intake Total 645 ml 915 ml


 


Output Total 1245 ml 1175 ml


 


Balance -600 ml -260 ml


 


  


 


Intake Free Water 150 ml 


 


IV Total 55 ml 275 ml


 


Tube Feeding 440 ml 640 ml


 


Output Urine Total 1245 ml 1175 ml








General Appearance:  no acute distress


HEENT:  normocephalic


Respiratory/Chest:  lungs clear


Cardiovascular:  normal rate





Microbiology








 Date/Time


Source Procedure


Growth Status


 


 


 3/16/19 03:00


Sputum Induced Gram Stain - Final Resulted


 


 3/16/19 03:00 Sputum Culture - Preliminary


Gram Negative Bacillus 1 Resulted








Laboratory Tests


3/18/19 04:25: 


Prothrombin Time 12.1H, Prothromb Time International Ratio 1.2H, Sodium Level 

147H, Potassium Level 3.1L, Chloride Level 105, Carbon Dioxide Level 35H, Anion 

Gap 7, Blood Urea Nitrogen 31H, Creatinine 0.8, Estimat Glomerular Filtration 

Rate , Glucose Level 134H, Calcium Level 8.9





Current Medications








 Medications


  (Trade)  Dose


 Ordered  Sig/Toribio


 Route


 PRN Reason  Start Time


 Stop Time Status Last Admin


Dose Admin


 


 Azithromycin 500


 mg/Dextrose  275 ml @ 


 275 mls/hr  Q24HRS


 IV


   3/14/19 18:30


 3/20/19 19:29  3/17/19 18:26


 


 


 Ceftriaxone


 Sodium 1 gm/


 Dextrose  55 ml @ 


 110 mls/hr  Q24H


 IVPB


   3/14/19 18:00


 3/21/19 17:59  3/17/19 18:25


 


 


 Chlorhexidine


 Gluconate


  (Hanna-Hex 2%)  1 applic  DAILY@2000


 TOPIC


   3/14/19 20:00


 4/13/19 19:59  3/17/19 19:54


 


 


 Dextrose


  (Dextrose 50%)  25 ml  Q30M  PRN


 IV


 Hypoglycemia  3/12/19 22:45


 4/11/19 22:44   


 


 


 Dextrose


  (Dextrose 50%)  50 ml  Q30M  PRN


 IV


 Hypoglycemia  3/12/19 22:45


 4/11/19 22:44   


 


 


 Famotidine


  (Pepcid)  20 mg  BID


 ORAL


   3/17/19 18:00


 4/16/19 17:59  3/18/19 08:14


 


 


 Furosemide


  (Lasix)  40 mg  DAILY


 IV


   3/16/19 09:00


 4/15/19 08:59  3/18/19 08:14


 


 


 Magnesium Oxide


  (Mag-Ox 400mg)  400 mg  THREE TIMES A  DAY


 ORAL


   3/16/19 18:00


 4/15/19 17:59  3/18/19 08:14


 


 


 Ondansetron HCl


  (Zofran)  4 mg  Q6H  PRN


 IVP


 Nausea & Vomiting  3/12/19 22:45


 4/11/19 22:44   


 


 


 Potassium Chloride


  (K-Dur)  40 meq  TID


 NGT


   3/17/19 13:00


 4/16/19 12:59  3/18/19 08:15


 


 


 Warfarin Sodium


  (Coumadin per


 pharmacy)  1 ea  DAILY  PRN


 MISC


 Per rx protocol  3/17/19 00:15


 4/16/19 00:14   


 

















Nigel Aguayo MD Mar 18, 2019 12:43

## 2019-03-19 NOTE — DIAGNOSTIC IMAGING REPORT
Indication: NG tube

 

Comparison:  None

 

Single view of the abdomen obtained 

 

Findings:

   

NG tube is projected over the stomach. IVC filter is noted also.

 

IMPRESSION:

 

NG tube satisfactory position

## 2019-03-19 NOTE — PROGRESS NOTE
DATE:  03/18/2019

CARDIOLOGY PROGRESS NOTE



SUBJECTIVE:  The patient's weaning parameters remained poor.  She remains

on ventilator support, orally intubated.



OBJECTIVE:

VITAL SIGNS:  Blood pressure 104/51, heart rate 59 to 96, respiratory 16 to

36.  She is afebrile.

LUNGS:  Coarse breath sounds with rhonchi.

CARDIAC:  Regular rhythm and rate.  Normal S1 and S2.

ABDOMEN:  Soft.  No edema.

NEUROLOGIC:  Left-sided weakness.



LABORATORY DATA:  Sodium 147, potassium 3.1, bicarb 35, BUN 31, creatinine

0.8, and INR 1.2.



IMPRESSION:

1. Respiratory failure.

2. Aspiration pneumonia.

3. DVT.

4. Hypomagnesemia.

5. Dehydration.

6. ____.

7. Prerenal azotemia.

8. Acute on chronic diastolic congestive heart failure.

9. Cellulitis, right leg.



RECOMMENDATIONS:

1. Antimicrobials.

2. Skin care.

3. Weaning efforts.

4. Diuresis.

5. Full anticoagulation with INR goal of 2 to 3.









  ______________________________________________

  Misbah Francis M.D.





DR:  MAIA

D:  03/19/2019 02:37

T:  03/19/2019 03:59

JOB#:  [id number]

CC:

## 2019-03-19 NOTE — GENERAL PROGRESS NOTE
Assessment/Plan


Problem List:  


(1) Hypomagnesemia


ICD Codes:  E83.42 - Hypomagnesemia


SNOMED:  764026721


(2) HCAP (healthcare-associated pneumonia)


ICD Codes:  J18.9 - Pneumonia, unspecified organism


SNOMED:  391013698


(3) Dyspnea


ICD Codes:  R06.00 - Dyspnea, unspecified


SNOMED:  538472268


(4) Hypokalemia


ICD Codes:  E87.6 - Hypokalemia


SNOMED:  94896605


(5) Alzheimer's dementia


ICD Codes:  G30.9 - Alzheimer's disease, unspecified


SNOMED:  34611911


(6) HTN (hypertension)


ICD Codes:  I10 - Essential (primary) hypertension


SNOMED:  20226282


(7) CVA (cerebral vascular accident)


ICD Codes:  I63.9 - Cerebral infarction, unspecified


SNOMED:  016945629


(8) Respiratory distress


ICD Codes:  R06.00 - Dyspnea, unspecified


SNOMED:  174080680


(9) Peripheral edema


ICD Codes:  R60.9 - Edema, unspecified


SNOMED:  123796933, 089396376


(10) Congestive heart failure (CHF)


ICD Codes:  I50.9 - Heart failure, unspecified


SNOMED:  50435711


(11) Respiratory failure


ICD Codes:  J96.90 - Respiratory failure, unspecified, unspecified whether with 

hypoxia or hypercapnia


SNOMED:  664443387, 319365521


(12) Cellulitis of leg


ICD Codes:  L03.119 - Cellulitis of unspecified part of limb


SNOMED:  005872839


(13) Dementia


ICD Codes:  F03.90 - Unspecified dementia without behavioral disturbance


SNOMED:  49286267


Assessment/Plan


extubated,  anxious to add seroquel, prior on ativan 40 yrs but benzo high risk 

in elderly, frail  cxr better, continue iv antibiotics,change levaquin for 

pseudomonas in sjputum  replace K, Mg, titration cardiac meds, full code per 

daughter





Subjective


ROS Limited/Unobtainable:  Yes


Allergies:  


Coded Allergies:  


     No Known Allergies (Unverified , 10/4/16)





Objective





Last 24 Hour Vital Signs








  Date Time  Temp Pulse Resp B/P (MAP) Pulse Ox O2 Delivery O2 Flow Rate FiO2


 


3/19/19 18:00  72 35 143/63 (89) 97   


 


3/19/19 17:00  73 35 146/66 (92) 95   


 


3/19/19 16:00 98.8 75 33 144/65 (91) 97   


 


3/19/19 16:00      Nasal Cannula 3.0 





      Venturi Mask  


 


3/19/19 16:00  85      


 


3/19/19 15:00  77 34 133/67 (89) 96   


 


3/19/19 14:00  78 35 157/71 (99) 97   


 


3/19/19 13:00  77 32 157/70 (99) 98   


 


3/19/19 12:47       12.0 40


 


3/19/19 12:46       12.0 40


 


3/19/19 12:00 98.9 84 29 119/56 (77) 98   


 


3/19/19 12:00      Venturi Mask  





      Venturi Mask  


 


3/19/19 12:00  82      


 


3/19/19 12:00       10.0 40


 


3/19/19 11:02  86 31     


 


3/19/19 11:00  97 32 138/72 (94) 98   


 


3/19/19 10:00  74 16 127/50 (75) 94   


 


3/19/19 09:08  65 22     35


 


3/19/19 09:00  63 16 91/45 (60) 94   


 


3/19/19 08:47     100   


 


3/19/19 08:00  74      


 


3/19/19 08:00        30


 


3/19/19 08:00      Mechanical Ventilator  





      Mechanical Ventilator  


 


3/19/19 08:00  71 16 87/47 (60) 94   


 


3/19/19 07:38  77 21     35


 


3/19/19 07:00 98.8 75 16 148/52 (84) 94   


 


3/19/19 06:00  58 16 96/50 (65) 94   


 


3/19/19 05:26  84 16     30


 


3/19/19 05:00  56 16 112/51 (71) 96   


 


3/19/19 04:00 98.8 60 16 94/51 (65) 97   


 


3/19/19 04:00        30


 


3/19/19 04:00  58      


 


3/19/19 04:00      Mechanical Ventilator  





      Mechanical Ventilator  


 


3/19/19 03:28  72 17     30


 


3/19/19 03:00  57 16 123/59 (80) 97   


 


3/19/19 02:00  69 17 141/68 (92) 97   


 


3/19/19 01:02  84 17     30


 


3/19/19 01:00  86 20 168/72 (104) 100   


 


3/19/19 00:00      Mechanical Ventilator  





      Mechanical Ventilator  


 


3/19/19 00:00 99.6 79 17 156/61 (92) 97   


 


3/19/19 00:00        30


 


3/19/19 00:00  60      


 


3/18/19 23:00  76 18 136/51 (79) 98   


 


3/18/19 22:41  62 16     30


 


3/18/19 22:00  62 16 104/52 (69) 97   


 


3/18/19 21:04  89 16     30


 


3/18/19 21:00  62 16 107/60 (76) 94   


 


3/18/19 20:00      Mechanical Ventilator  





      Mechanical Ventilator  


 


3/18/19 20:00  63      


 


3/18/19 20:00 98.9 63 16 144/69 (94) 96   


 


3/18/19 20:00        30


 


3/18/19 19:10  80 17     30

















Intake and Output  


 


 3/18/19 3/19/19





 19:00 07:00


 


Intake Total 600 ml 600 ml


 


Output Total 600 ml 580 ml


 


Balance 0 ml 20 ml


 


  


 


Tube Feeding 600 ml 600 ml


 


Output Urine Total 600 ml 580 ml








Laboratory Tests


3/19/19 04:00: 


Prothrombin Time 12.7H, Prothromb Time International Ratio 1.2H, Sodium Level 

146H, Potassium Level 4.0, Chloride Level 106, Carbon Dioxide Level 33H, Anion 

Gap 8, Blood Urea Nitrogen 34H, Creatinine 0.8, Estimat Glomerular Filtration 

Rate , Glucose Level 106, Calcium Level 9.3


3/19/19 08:10: 


Arterial Blood pH 7.510H, Arterial Blood Partial Pressure CO2 39.8, Arterial 

Blood Partial Pressure O2 82.9, Arterial Blood HCO3 31.0H, Arterial Blood 

Oxygen Saturation 96.0, Arterial Blood Base Excess 7.5H, Joaquin Test Positive


3/19/19 12:00: 


Arterial Blood pH 7.458H, Arterial Blood Partial Pressure CO2 48.1H, Arterial 

Blood Partial Pressure O2 94.1, Arterial Blood HCO3 33.3H, Arterial Blood 

Oxygen Saturation 96.9, Arterial Blood Base Excess 8.2H, Joaquin Test Positive


Height (Feet):  5


Height (Inches):  6.00


Weight (Pounds):  171


General Appearance:  alert, confused, mild distress


EENT:  normal ENT inspection


Neck:  normal alignment


Cardiovascular:  normal rate, regular rhythm


Respiratory/Chest:  lungs clear


Abdomen:  non tender, soft


Extremities:  inflammation


Edema:  mild edema


Neurologic:  disoriented











Presley Lomax MD Mar 19, 2019 19:07

## 2019-03-19 NOTE — PULMONOLOGY PROGRESS NOTE
Assessment/Plan


Assessment/Plan


1. Congestive heart failure.


2. Acute respiratory failure.


3. Hypotension.


4. Pneumonia.





better weaning parameters; I checked at bedside today


c/s sputum with GNR; adjust abx, disc w pharmacist


cont nebs


disc w RN


possible extubation





Subjective


ROS Limited/Unobtainable:  Yes


Allergies:  


Coded Allergies:  


     No Known Allergies (Unverified , 10/4/16)





Objective





Last 24 Hour Vital Signs








  Date Time  Temp Pulse Resp B/P (MAP) Pulse Ox O2 Delivery O2 Flow Rate FiO2


 


3/19/19 10:00  74 16 127/50 (75) 94   


 


3/19/19 09:08  65 22     35


 


3/19/19 09:00  63 16 91/45 (60) 94   


 


3/19/19 08:47     100   


 


3/19/19 08:00        30


 


3/19/19 08:00      Mechanical Ventilator  





      Mechanical Ventilator  


 


3/19/19 08:00  71 16 87/47 (60) 94   


 


3/19/19 07:38  77 21     35


 


3/19/19 07:00 98.8 75 16 148/52 (84) 94   


 


3/19/19 06:00  58 16 96/50 (65) 94   


 


3/19/19 05:26  84 16     30


 


3/19/19 05:00  56 16 112/51 (71) 96   


 


3/19/19 04:00 98.8 60 16 94/51 (65) 97   


 


3/19/19 04:00        30


 


3/19/19 04:00  58      


 


3/19/19 04:00      Mechanical Ventilator  





      Mechanical Ventilator  


 


3/19/19 03:28  72 17     30


 


3/19/19 03:00  57 16 123/59 (80) 97   


 


3/19/19 02:00  69 17 141/68 (92) 97   


 


3/19/19 01:02  84 17     30


 


3/19/19 01:00  86 20 168/72 (104) 100   


 


3/19/19 00:00      Mechanical Ventilator  





      Mechanical Ventilator  


 


3/19/19 00:00 99.6 79 17 156/61 (92) 97   


 


3/19/19 00:00        30


 


3/19/19 00:00  60      


 


3/18/19 23:00  76 18 136/51 (79) 98   


 


3/18/19 22:41  62 16     30


 


3/18/19 22:00  62 16 104/52 (69) 97   


 


3/18/19 21:04  89 16     30


 


3/18/19 21:00  62 16 107/60 (76) 94   


 


3/18/19 20:00      Mechanical Ventilator  





      Mechanical Ventilator  


 


3/18/19 20:00  63      


 


3/18/19 20:00 98.9 63 16 144/69 (94) 96   


 


3/18/19 20:00        30


 


3/18/19 19:10  80 17     30


 


3/18/19 19:00  78 19 143/74 (97) 96   


 


3/18/19 18:00  70 18 159/65 (96) 95   


 


3/18/19 17:13  54 16     35


 


3/18/19 17:00  56 16 113/46 (68) 96   


 


3/18/19 16:00 99.2 71 18 167/69 (101) 97   


 


3/18/19 16:00      Mechanical Ventilator  





      Mechanical Ventilator  


 


3/18/19 16:00        35


 


3/18/19 16:00  57      


 


3/18/19 15:07  66 16     35


 


3/18/19 15:00  59 16 94/43 (60) 94   


 


3/18/19 14:00  70 19 165/66 (99) 96   


 


3/18/19 13:12  69 17     35


 


3/18/19 13:00  72 18 176/75 (108) 98   


 


3/18/19 12:00        35


 


3/18/19 12:00  59      


 


3/18/19 12:00      Mechanical Ventilator  





      Mechanical Ventilator  


 


3/18/19 12:00 99.0 55 16 106/46 (66) 96   


 


3/18/19 11:27  72 16     35

















Intake and Output  


 


 3/18/19 3/19/19





 18:59 06:59


 


Intake Total 600 ml 600 ml


 


Output Total 570 ml 600 ml


 


Balance 30 ml 0 ml


 


  


 


Tube Feeding 600 ml 600 ml


 


Output Urine Total 570 ml 600 ml








General Appearance:  no acute distress


HEENT:  atraumatic


Respiratory/Chest:  lungs clear


Cardiovascular:  normal rate


Laboratory Tests


3/19/19 04:00: 


Prothrombin Time 12.7H, Prothromb Time International Ratio 1.2H, Sodium Level 

146H, Potassium Level 4.0, Chloride Level 106, Carbon Dioxide Level 33H, Anion 

Gap 8, Blood Urea Nitrogen 34H, Creatinine 0.8, Estimat Glomerular Filtration 

Rate , Glucose Level 106, Calcium Level 9.3


3/19/19 08:10: 


Arterial Blood pH 7.510H, Arterial Blood Partial Pressure CO2 39.8, Arterial 

Blood Partial Pressure O2 82.9, Arterial Blood HCO3 31.0H, Arterial Blood 

Oxygen Saturation 96.0, Arterial Blood Base Excess 7.5H, Joaquin Test Positive





Current Medications








 Medications


  (Trade)  Dose


 Ordered  Sig/Toribio


 Route


 PRN Reason  Start Time


 Stop Time Status Last Admin


Dose Admin


 


 Chlorhexidine


 Gluconate


  (Hanna-Hex 2%)  1 applic  DAILY@2000


 TOPIC


   3/14/19 20:00


 4/13/19 19:59  3/18/19 20:17


 


 


 Dextrose


  (Dextrose 50%)  25 ml  Q30M  PRN


 IV


 Hypoglycemia  3/12/19 22:45


 4/11/19 22:44   


 


 


 Dextrose


  (Dextrose 50%)  50 ml  Q30M  PRN


 IV


 Hypoglycemia  3/12/19 22:45


 4/11/19 22:44   


 


 


 Famotidine


  (Pepcid)  20 mg  BID


 ORAL


   3/17/19 18:00


 4/16/19 17:59  3/19/19 08:19


 


 


 Levofloxacin  150 ml @ 


 150 mls/hr  Q48H


 IVPB


   3/19/19 11:00


 3/26/19 10:59  3/19/19 10:50


 


 


 Magnesium Oxide


  (Mag-Ox 400mg)  400 mg  THREE TIMES A  DAY


 ORAL


   3/16/19 18:00


 4/15/19 17:59  3/19/19 08:19


 


 


 Ondansetron HCl


  (Zofran)  4 mg  Q6H  PRN


 IVP


 Nausea & Vomiting  3/12/19 22:45


 4/11/19 22:44   


 


 


 Potassium Chloride


  (K-Dur)  40 meq  TID


 NGT


   3/17/19 13:00


 4/16/19 12:59  3/19/19 08:19


 


 


 Quetiapine


 Fumarate


  (SEROquel)  25 mg  Q12HR


 ORAL


   3/18/19 21:00


 4/17/19 20:59  3/19/19 08:19


 


 


 Spironolactone


  (Aldactone)  25 mg  DAILY


 ORAL


   3/18/19 18:30


 4/17/19 18:29  3/19/19 08:19


 


 


 Warfarin Sodium


  (Coumadin per


 pharmacy)  1 ea  DAILY  PRN


 MISC


 Per rx protocol  3/17/19 00:15


 4/16/19 00:14   


 


 


 Warfarin Sodium


  (Coumadin)  3 mg  COUMADIN  ONCE


 ORAL


   3/19/19 17:00


 3/19/19 17:01   


 

















Nigel Aguayo MD Mar 19, 2019 11:02

## 2019-03-20 NOTE — DIAGNOSTIC IMAGING REPORT
Indications: Needs long-term IV access

 

Technique: Procedure performed at bedside. Procedural timeout performed. Ultrasound

confirms patent compressible right basilic vein. Total sterile technique, including

sterile probe cover and sterile gel, sterile gloves, hand hygiene, hat, mask,,

sterile gown, large sterile drape, and preparation with 2% chlorhexidine utilized.

Local anesthesia with 1% lidocaine. Under real-time ultrasound guidance, puncture

basilic vein using 21-gauge needle, passage 0.018 guidewire, exchange for 4 Turkmen

peel-away sheath. 4 Turkmen Bard dual-lumen power PICC cut to 41 cm. It was inserted

through the peel-away sheath. Peel-away sheath and guidewire removed. Catheter fixed

to the skin. Both catheter ports aspirated and flushed. Patient tolerated procedure

well, without immediate complication. Followup chest x-ray obtained, documents

catheter tip position at the cavoatrial junction

 

Impression: Successful bedside placement of right arm PICC under sonographic

guidance, as described above.

## 2019-03-20 NOTE — PROGRESS NOTE
DATE:  03/19/2019

CARDIOLOGY PROGRESS NOTE



SUBJECTIVE:  Extubated this morning with no distress thus far.  On

antimicrobials.  Maintenance hydration.  Remaining NPO.  Monitored sinus.

Atrial ectopics were noted.



OBJECTIVE:

VITAL SIGNS:  Blood pressure 156/58, pulse 72, respirations 35, and on 2

liters nasal cannula 97% sat.

LUNGS:  Coarse breath sounds and rhonchi.

HEART:  Regular rhythm and rate.  Normal S1, S2.

ABDOMEN:  Soft.

EXTREMITIES:  No edema.

NEUROLOGIC:  Left hemiparesis.



LABORATORY DATA:  Sodium 146, potassium 4.0, bicarb 33, BUN 34, and

creatinine 0.8.  ABG, pH 7.45, pCO2 48, and pO2 94.  INR 1.2.



IMPRESSION:

1. Subtherapeutic INR.

2. History of DVT.

3. Hypomagnesemia.

4. Hypokalemia.

5. Cerebrovascular accident.

6. Hypertensive heart disease.

7. Labile blood pressure.



PLAN:

1. Additional warfarin added.

2. NPO pending swallow evaluation.

3. Antimicrobials.

4. Replace potassium and magnesium.

5. Anxiolytics as tolerated.









  ______________________________________________

  Misbah Francis M.D.





DR:  WILLIE

D:  03/20/2019 00:00

T:  03/20/2019 00:53

JOB#:  [id number]

CC:

## 2019-03-20 NOTE — GENERAL PROGRESS NOTE
Assessment/Plan


Problem List:  


(1) Hypomagnesemia


ICD Codes:  E83.42 - Hypomagnesemia


SNOMED:  331288097


(2) HCAP (healthcare-associated pneumonia)


ICD Codes:  J18.9 - Pneumonia, unspecified organism


SNOMED:  388195404


(3) Dyspnea


ICD Codes:  R06.00 - Dyspnea, unspecified


SNOMED:  134871652


(4) Hypokalemia


ICD Codes:  E87.6 - Hypokalemia


SNOMED:  70138543


(5) Alzheimer's dementia


ICD Codes:  G30.9 - Alzheimer's disease, unspecified


SNOMED:  44659364


(6) HTN (hypertension)


ICD Codes:  I10 - Essential (primary) hypertension


SNOMED:  07252819


(7) CVA (cerebral vascular accident)


ICD Codes:  I63.9 - Cerebral infarction, unspecified


SNOMED:  241129629


(8) Respiratory distress


ICD Codes:  R06.00 - Dyspnea, unspecified


SNOMED:  762585380


(9) Peripheral edema


ICD Codes:  R60.9 - Edema, unspecified


SNOMED:  534482588, 742639435


(10) Congestive heart failure (CHF)


ICD Codes:  I50.9 - Heart failure, unspecified


SNOMED:  82647265


(11) Respiratory failure


ICD Codes:  J96.90 - Respiratory failure, unspecified, unspecified whether with 

hypoxia or hypercapnia


SNOMED:  961418557, 485077411


(12) Cellulitis of leg


ICD Codes:  L03.119 - Cellulitis of unspecified part of limb


SNOMED:  756964226


(13) Dementia


ICD Codes:  F03.90 - Unspecified dementia without behavioral disturbance


SNOMED:  32193446


Assessment/Plan


extubated,  anxious to add seroquel, prior on ativan 40 yrs but benzo high risk 

in elderly, frail  cxr better, continue iv antibiotics,change levaquin for 

pseudomonas in sputum  replace K, Mg, titration cardiac meds, full code per 

daughter swollow eval pending





Subjective


ROS Limited/Unobtainable:  Yes


Allergies:  


Coded Allergies:  


     No Known Allergies (Unverified , 10/4/16)





Objective





Last 24 Hour Vital Signs








  Date Time  Temp Pulse Resp B/P (MAP) Pulse Ox O2 Delivery O2 Flow Rate FiO2


 


3/20/19 18:00  78 30 142/71 (94) 96   


 


3/20/19 17:00  78 32 142/58 (86) 97   


 


3/20/19 16:00  76      


 


3/20/19 16:00      Nasal Cannula 2.0 





      Nasal Cannula 2.0 


 


3/20/19 16:00 97.8 79 28 140/58 (85) 95   


 


3/20/19 16:00  79 28 140/58 (85) 95   


 


3/20/19 15:00  78 27 125/46 (72) 96   


 


3/20/19 15:00  78 27 125/46 (72) 96   


 


3/20/19 14:00  69 24 101/35 (57) 98   


 


3/20/19 13:00  87 32 142/58 (86) 96   


 


3/20/19 12:00  77      


 


3/20/19 12:00 97.9 80 29 139/61 (87) 97   


 


3/20/19 12:00      Nasal Cannula 2.0 





      Nasal Cannula 2.0 


 


3/20/19 11:00  70 23 122/53 (76) 99   


 


3/20/19 10:00  67 23 112/44 (66) 100   


 


3/20/19 09:36     98 Nasal Cannula 2.0 28


 


3/20/19 09:36      Nasal Cannula 2.0 28


 


3/20/19 09:00  86 36 145/65 (91) 98   


 


3/20/19 08:00  80      


 


3/20/19 08:00 99.2 83 33 155/63 (93) 95   


 


3/20/19 08:00      Nasal Cannula 2.0 





      Nasal Cannula 2.0 


 


3/20/19 07:00  69 26 137/56 (83) 96   


 


3/20/19 06:00  72 25 137/53 (81) 96   


 


3/20/19 05:00  70 29 123/45 (71) 97   


 


3/20/19 04:00  77      


 


3/20/19 04:00      Nasal Cannula 2.0 





      Nasal Cannula 2.0 


 


3/20/19 04:00 99.3 72 34 132/52 (78) 97   


 


3/20/19 03:00  82 38 159/66 (97) 96   


 


3/20/19 02:00  86 37 157/66 (96) 94   


 


3/20/19 01:00  80 34 154/62 (92) 98   


 


3/20/19 00:00  85      


 


3/20/19 00:00  92 27 158/70 (99) 93   


 


3/20/19 00:00      Nasal Cannula 2.0 





      Nasal Cannula 2.0 


 


3/19/19 23:00  71 26 132/53 (79) 98   


 


3/19/19 22:00  75 26 122/57 (78) 96   


 


3/19/19 21:00  84 31 138/60 (86) 95   


 


3/19/19 20:00      Nasal Cannula 2.0 





      Nasal Cannula 2.0 


 


3/19/19 20:00 98.8 86 36 160/61 (94) 97   


 


3/19/19 20:00  77      


 


3/19/19 19:00  72 35 156/58 (90) 97   

















Intake and Output  


 


 3/19/19 3/20/19





 19:00 07:00


 


Intake Total 420 ml 855 ml


 


Output Total 590 ml 510 ml


 


Balance -170 ml 345 ml


 


  


 


Intake Free Water 120 ml 


 


IV Total  330 ml


 


Tube Feeding 300 ml 525 ml


 


Output Urine Total 590 ml 510 ml








Laboratory Tests


3/20/19 04:00: 


Prothrombin Time 13.5H, Prothromb Time International Ratio 1.3H, Sodium Level 

147H, Potassium Level 4.1, Chloride Level 107, Carbon Dioxide Level 33H, Anion 

Gap 7, Blood Urea Nitrogen 31H, Creatinine 0.8, Estimat Glomerular Filtration 

Rate , Glucose Level 119H, Calcium Level 9.5


Height (Feet):  5


Height (Inches):  6.00


Weight (Pounds):  169


General Appearance:  alert, confused


EENT:  normal ENT inspection


Neck:  normal alignment


Cardiovascular:  normal rate, regular rhythm


Respiratory/Chest:  rhonchi - bilaterally


Abdomen:  non tender, soft


Edema:  mild edema


Neurologic:  disoriented











Presley Lomax MD Mar 20, 2019 18:45

## 2019-03-20 NOTE — PULMONOLOGY PROGRESS NOTE
Assessment/Plan


Assessment/Plan


1. Congestive heart failure.


2. Acute respiratory failure.


3. Hypotension.


4. Pneumonia.


5. Tardive dyskinesia





extubated yesterday with no complications


c/s sputum with pseudomonas; rx levaquin


cont nebs


swallow eval


disc w RN





Subjective


ROS Limited/Unobtainable:  Yes


Respiratory:  Denies: shortness of breath


Allergies:  


Coded Allergies:  


     No Known Allergies (Unverified , 10/4/16)





Objective





Last 24 Hour Vital Signs








  Date Time  Temp Pulse Resp B/P (MAP) Pulse Ox O2 Delivery O2 Flow Rate FiO2


 


3/20/19 12:00  77      


 


3/20/19 12:00 97.9 80 29 139/61 (87) 97   


 


3/20/19 12:00      Nasal Cannula 2.0 





      Nasal Cannula 2.0 


 


3/20/19 11:00  70 23 122/53 (76) 99   


 


3/20/19 10:00  67 23 112/44 (66) 100   


 


3/20/19 09:36     98 Nasal Cannula 2.0 28


 


3/20/19 09:36      Nasal Cannula 2.0 28


 


3/20/19 09:00  86 36 145/65 (91) 98   


 


3/20/19 08:00  80      


 


3/20/19 08:00 99.2 83 33 155/63 (93) 95   


 


3/20/19 08:00      Nasal Cannula 2.0 





      Nasal Cannula 2.0 


 


3/20/19 07:00  69 26 137/56 (83) 96   


 


3/20/19 06:00  72 25 137/53 (81) 96   


 


3/20/19 05:00  70 29 123/45 (71) 97   


 


3/20/19 04:00  77      


 


3/20/19 04:00      Nasal Cannula 2.0 





      Nasal Cannula 2.0 


 


3/20/19 04:00 99.3 72 34 132/52 (78) 97   


 


3/20/19 03:00  82 38 159/66 (97) 96   


 


3/20/19 02:00  86 37 157/66 (96) 94   


 


3/20/19 01:00  80 34 154/62 (92) 98   


 


3/20/19 00:00  85      


 


3/20/19 00:00  92 27 158/70 (99) 93   


 


3/20/19 00:00      Nasal Cannula 2.0 





      Nasal Cannula 2.0 


 


3/19/19 23:00  71 26 132/53 (79) 98   


 


3/19/19 22:00  75 26 122/57 (78) 96   


 


3/19/19 21:00  84 31 138/60 (86) 95   


 


3/19/19 20:00      Nasal Cannula 2.0 





      Nasal Cannula 2.0 


 


3/19/19 20:00 98.8 86 36 160/61 (94) 97   


 


3/19/19 20:00  77      


 


3/19/19 19:00  72 35 156/58 (90) 97   


 


3/19/19 18:00  72 35 143/63 (89) 97   


 


3/19/19 17:00  73 35 146/66 (92) 95   


 


3/19/19 16:00 98.8 75 33 144/65 (91) 97   


 


3/19/19 16:00      Nasal Cannula 3.0 





      Venturi Mask  


 


3/19/19 16:00  85      


 


3/19/19 15:00  77 34 133/67 (89) 96   


 


3/19/19 14:00  78 35 157/71 (99) 97   


 


3/19/19 13:00  77 32 157/70 (99) 98   

















Intake and Output  


 


 3/19/19 3/20/19





 18:59 06:59


 


Intake Total 420 ml 855 ml


 


Output Total 600 ml 480 ml


 


Balance -180 ml 375 ml


 


  


 


Intake Free Water 120 ml 


 


IV Total  330 ml


 


Tube Feeding 300 ml 525 ml


 


Output Urine Total 600 ml 480 ml








General Appearance:  no acute distress


HEENT:  atraumatic, other - tardive dyskinesia


Respiratory/Chest:  lungs clear


Cardiovascular:  normal rate


Laboratory Tests


3/20/19 04:00: 


Prothrombin Time 13.5H, Prothromb Time International Ratio 1.3H, Sodium Level 

147H, Potassium Level 4.1, Chloride Level 107, Carbon Dioxide Level 33H, Anion 

Gap 7, Blood Urea Nitrogen 31H, Creatinine 0.8, Estimat Glomerular Filtration 

Rate , Glucose Level 119H, Calcium Level 9.5





Current Medications








 Medications


  (Trade)  Dose


 Ordered  Sig/Toribio


 Route


 PRN Reason  Start Time


 Stop Time Status Last Admin


Dose Admin


 


 Chlorhexidine


 Gluconate


  (Hanna-Hex 2%)  1 applic  DAILY@2000


 TOPIC


   3/19/19 20:00


 4/18/19 19:59  3/19/19 20:45


 


 


 Dextrose


  (Dextrose 50%)  25 ml  Q30M  PRN


 IV


 Hypoglycemia  3/12/19 22:45


 4/11/19 22:44   


 


 


 Dextrose


  (Dextrose 50%)  50 ml  Q30M  PRN


 IV


 Hypoglycemia  3/12/19 22:45


 4/11/19 22:44   


 


 


 Famotidine


  (Pepcid)  20 mg  BID


 ORAL


   3/17/19 18:00


 4/16/19 17:59  3/20/19 08:48


 


 


 Furosemide


  (Lasix)  20 mg  DAILY


 ORAL


   3/20/19 09:00


 4/19/19 08:59  3/20/19 08:46


 


 


 Heparin Sodium/


 Sodium Chloride


  (Heparin 1000


 units/500ml


 Premix)  1,000 unit  ONCE  PRN


 IV


 PICC  3/19/19 19:01


 3/20/19 23:59   


 


 


 Levofloxacin  150 ml @ 


 150 mls/hr  Q48H


 IVPB


   3/19/19 11:00


 3/26/19 10:59  3/19/19 10:50


 


 


 Lidocaine HCl


  (Xylocaine 1%


 30ml)  30 ml  ONCE  PRN


 INJ


 picc  3/19/19 19:01


 3/20/19 23:59   


 


 


 Magnesium Oxide


  (Mag-Ox 400mg)  400 mg  THREE TIMES A  DAY


 ORAL


   3/16/19 18:00


 4/15/19 17:59  3/20/19 08:46


 


 


 Ondansetron HCl


  (Zofran)  4 mg  Q6H  PRN


 IVP


 Nausea & Vomiting  3/12/19 22:45


 4/11/19 22:44   


 


 


 Quetiapine


 Fumarate


  (SEROquel)  25 mg  Q12HR


 ORAL


   3/18/19 21:00


 4/17/19 20:59  3/20/19 08:48


 


 


 Spironolactone


  (Aldactone)  25 mg  DAILY


 ORAL


   3/18/19 18:30


 4/17/19 18:29  3/20/19 08:45


 


 


 Warfarin Sodium


  (Coumadin per


 pharmacy)  1 ea  DAILY  PRN


 MISC


 Per rx protocol  3/17/19 00:15


 4/16/19 00:14   


 

















Nigel Aguayo MD Mar 20, 2019 12:52

## 2019-03-21 NOTE — PROGRESS NOTE
DATE:  03/20/2019

CARDIOLOGY PROGRESS NOTE



SUBJECTIVE:  The patient was extubated yesterday, in no respiratory

distress.  Continues with antimicrobials for Pseudomonas lung infection.

Continues on bronchodilator.  She is status post PICC line insertion.



OBJECTIVE:

VITAL SIGNS:  Blood pressure 139/61, pulse 80, respirations 29, afebrile,

and 2L oxygen saturation is 97%.

LUNGS:  Coarse breath sounds.  Rhonchi.

HEART:  Regular rhythm and rate.  Normal S1 and S2.

ABDOMEN:  Soft.

EXTREMITIES:  Left-sided weakness.  Trace edema.



IMPRESSION:

1. Cerebrovascular accident.

2. Dysphagia.

3. Respiratory failure, status post extubation.

4. Dehydration.

5. Hyponatremia.

6. Acute on chronic diastolic congestive heart failure.

7. Deep venous thrombosis, chronic and recurrent.



PLAN:

1. Free water replacement.

2. Hold diuretics.

3. Pending correction of metabolic abnormalities.

4. Respiratory hygiene.

5. Warfarin to INR goal of 2 to 3.









  ______________________________________________

  Misbah Francis M.D. DR:  FLORY

D:  03/21/2019 01:22

T:  03/21/2019 10:24

JOB#:  [id number]

CC:

## 2019-03-21 NOTE — GENERAL PROGRESS NOTE
Assessment/Plan


Problem List:  


(1) Hypomagnesemia


ICD Codes:  E83.42 - Hypomagnesemia


SNOMED:  007987368


(2) HCAP (healthcare-associated pneumonia)


ICD Codes:  J18.9 - Pneumonia, unspecified organism


SNOMED:  461597427


(3) Dyspnea


ICD Codes:  R06.00 - Dyspnea, unspecified


SNOMED:  997702360


(4) Hypokalemia


ICD Codes:  E87.6 - Hypokalemia


SNOMED:  94932808


(5) Alzheimer's dementia


ICD Codes:  G30.9 - Alzheimer's disease, unspecified


SNOMED:  88680117


(6) HTN (hypertension)


ICD Codes:  I10 - Essential (primary) hypertension


SNOMED:  65061314


(7) CVA (cerebral vascular accident)


ICD Codes:  I63.9 - Cerebral infarction, unspecified


SNOMED:  756415349


(8) Respiratory distress


ICD Codes:  R06.00 - Dyspnea, unspecified


SNOMED:  542389656


(9) Peripheral edema


ICD Codes:  R60.9 - Edema, unspecified


SNOMED:  284711977, 495400406


(10) Congestive heart failure (CHF)


ICD Codes:  I50.9 - Heart failure, unspecified


SNOMED:  80516929


(11) Respiratory failure


ICD Codes:  J96.90 - Respiratory failure, unspecified, unspecified whether with 

hypoxia or hypercapnia


SNOMED:  287066002, 836769404


(12) Cellulitis of leg


ICD Codes:  L03.119 - Cellulitis of unspecified part of limb


SNOMED:  223628307


(13) Dementia


ICD Codes:  F03.90 - Unspecified dementia without behavioral disturbance


SNOMED:  61912728


Assessment/Plan


extubated,  anxious to add seroquel, prior on ativan 40 yrs but benzo high risk 

in elderly, frail  cxr better, continue iv antibiotics,change levaquin for 

pseudomonas in sputum  replace K, Mg, titration cardiac meds, full code per 

daughter swollow eval pending





Subjective


ROS Limited/Unobtainable:  Yes


Allergies:  


Coded Allergies:  


     No Known Allergies (Unverified , 10/4/16)





Objective





Last 24 Hour Vital Signs








  Date Time  Temp Pulse Resp B/P (MAP) Pulse Ox O2 Delivery O2 Flow Rate FiO2


 


3/21/19 06:00  81 23 121/45 (70) 99   


 


3/21/19 05:00  67 23 123/34 (63) 99   


 


3/21/19 04:00 98.0 61 22 93/31 (51) 98   


 


3/21/19 04:00  62      


 


3/21/19 04:00      Nasal Cannula 2.0 





      Nasal Cannula 2.0 


 


3/21/19 03:00  82 21 125/74 (91) 98   


 


3/21/19 02:00  81 26 125/55 (78) 99   


 


3/21/19 01:00  70 22 94/40 (58) 94   


 


3/21/19 00:00  63      


 


3/21/19 00:00  71 24 102/40 (60) 98   


 


3/21/19 00:00      Nasal Cannula 2.0 





      Nasal Cannula 2.0 


 


3/20/19 23:00 98.3 67 23 99/34 (55) 98   


 


3/20/19 22:00  77 23 97/42 (60) 98   


 


3/20/19 21:00  82 25 129/47 (74) 94   


 


3/20/19 20:42      Nasal Cannula 2.0 28


 


3/20/19 20:42     96 Nasal Cannula 2.0 28


 


3/20/19 20:00      Nasal Cannula 2.0 





      Nasal Cannula 2.0 


 


3/20/19 20:00 98.2 72 24 117/48 (71) 95   


 


3/20/19 20:00  68      


 


3/20/19 19:00  66 26 110/51 (70) 95   


 


3/20/19 18:00 97.9 78 30 142/71 (94) 96   


 


3/20/19 17:00  78 32 142/58 (86) 97   


 


3/20/19 16:00  76      


 


3/20/19 16:00      Nasal Cannula 2.0 





      Nasal Cannula 2.0 


 


3/20/19 16:00  79 28 140/58 (85) 95   


 


3/20/19 15:00  78 27 125/46 (72) 96   


 


3/20/19 14:00  69 24 101/35 (57) 98   


 


3/20/19 13:00  87 32 142/58 (86) 96   


 


3/20/19 12:00  77      


 


3/20/19 12:00 97.9 80 29 139/61 (87) 97   


 


3/20/19 12:00      Nasal Cannula 2.0 





      Nasal Cannula 2.0 


 


3/20/19 11:00  70 23 122/53 (76) 99   


 


3/20/19 10:00  67 23 112/44 (66) 100   


 


3/20/19 09:36     98 Nasal Cannula 2.0 28


 


3/20/19 09:36      Nasal Cannula 2.0 28


 


3/20/19 09:00  86 36 145/65 (91) 98   

















Intake and Output  


 


 3/20/19 3/21/19





 19:00 07:00


 


Intake Total 660 ml 700 ml


 


Output Total 755 ml 430 ml


 


Balance -95 ml 270 ml


 


  


 


Intake Free Water  150 ml


 


Tube Feeding 600 ml 550 ml


 


Other 60 ml 


 


Output Urine Total 755 ml 430 ml








Laboratory Tests


3/21/19 04:30: 


White Blood Count 12.0H, Red Blood Count 4.17L, Hemoglobin 11.1L, Hematocrit 

35.8L, Mean Corpuscular Volume 86, Mean Corpuscular Hemoglobin 26.6L, Mean 

Corpuscular Hemoglobin Concent 31.0L, Red Cell Distribution Width 15.5H, 

Platelet Count 144L, Mean Platelet Volume 8.3, Neutrophils (%) (Auto) 78.3H, 

Lymphocytes (%) (Auto) 10.8L, Monocytes (%) (Auto) 8.8, Eosinophils (%) (Auto) 

1.7, Basophils (%) (Auto) 0.5, Prothrombin Time 14.5H, Prothromb Time 

International Ratio 1.4H, Sodium Level 146H, Potassium Level 4.1, Chloride 

Level 108H, Carbon Dioxide Level 33H, Anion Gap 5, Blood Urea Nitrogen 31H, 

Creatinine 0.8, Estimat Glomerular Filtration Rate , Glucose Level 133H, 

Calcium Level 9.3, Magnesium Level 2.2, Pro-B-Type Natriuretic Peptide 1070H


Height (Feet):  5


Height (Inches):  6.00


Weight (Pounds):  171


General Appearance:  no apparent distress, alert, confused


EENT:  normal ENT inspection


Neck:  normal alignment


Cardiovascular:  normal rate


Respiratory/Chest:  lungs clear


Abdomen:  non tender, soft


Edema:  trace edema


Neurologic:  disoriented











Presley Lomax MD Mar 21, 2019 08:19

## 2019-03-21 NOTE — PULMONOLOGY PROGRESS NOTE
Assessment/Plan


Assessment/Plan


1. Congestive heart failure.


2. Acute respiratory failure.


3. Hypotension.


4. Pneumonia.


5. Tardive dyskinesia





nods yes/no - no pain or SOB


MODERATELY SEVERE OROPHARYNGEAL DYSPHAGIA - needs PEG


c/s sputum with pseudomonas; rx levaquin


cont nebs


disc w Dtr at bedside





Subjective


ROS Limited/Unobtainable:  Yes


Allergies:  


Coded Allergies:  


     No Known Allergies (Unverified , 10/4/16)





Objective





Last 24 Hour Vital Signs








  Date Time  Temp Pulse Resp B/P (MAP) Pulse Ox O2 Delivery O2 Flow Rate FiO2


 


3/21/19 16:00      Nasal Cannula 2.0 





      Nasal Cannula 2.0 


 


3/21/19 16:00 98.9 74 29 144/67 (92) 95   


 


3/21/19 16:00  77      


 


3/21/19 12:00      Nasal Cannula 2.0 





      Nasal Cannula 2.0 


 


3/21/19 12:00 99.1 71 26 112/48 (69) 93   


 


3/21/19 12:00  63      


 


3/21/19 09:45      Nasal Cannula 2.0 





      Nasal Cannula 2.0 


 


3/21/19 08:00      Nasal Cannula 2.0 





      Nasal Cannula 2.0 


 


3/21/19 08:00 98.8 82 30 137/76 (96) 98   


 


3/21/19 08:00  75      


 


3/21/19 06:00  81 23 121/45 (70) 99   


 


3/21/19 05:00  67 23 123/34 (63) 99   


 


3/21/19 04:00 98.0 61 22 93/31 (51) 98   


 


3/21/19 04:00  62      


 


3/21/19 04:00      Nasal Cannula 2.0 





      Nasal Cannula 2.0 


 


3/21/19 03:00  82 21 125/74 (91) 98   


 


3/21/19 02:00  81 26 125/55 (78) 99   


 


3/21/19 01:00  70 22 94/40 (58) 94   


 


3/21/19 00:00  63      


 


3/21/19 00:00  71 24 102/40 (60) 98   


 


3/21/19 00:00      Nasal Cannula 2.0 





      Nasal Cannula 2.0 


 


3/20/19 23:00 98.3 67 23 99/34 (55) 98   


 


3/20/19 22:00  77 23 97/42 (60) 98   


 


3/20/19 21:00  82 25 129/47 (74) 94   


 


3/20/19 20:42      Nasal Cannula 2.0 28


 


3/20/19 20:42     96 Nasal Cannula 2.0 28


 


3/20/19 20:00      Nasal Cannula 2.0 





      Nasal Cannula 2.0 


 


3/20/19 20:00 98.2 72 24 117/48 (71) 95   


 


3/20/19 20:00  68      


 


3/20/19 19:00  66 26 110/51 (70) 95   

















Intake and Output  


 


 3/20/19 3/21/19





 19:00 07:00


 


Intake Total 660 ml 750 ml


 


Output Total 755 ml 430 ml


 


Balance -95 ml 320 ml


 


  


 


Intake Free Water  150 ml


 


Tube Feeding 600 ml 600 ml


 


Other 60 ml 


 


Output Urine Total 755 ml 430 ml








General Appearance:  no acute distress


Respiratory/Chest:  lungs clear


Cardiovascular:  normal rate


Laboratory Tests


3/21/19 04:30: 


White Blood Count 12.0H, Red Blood Count 4.17L, Hemoglobin 11.1L, Hematocrit 

35.8L, Mean Corpuscular Volume 86, Mean Corpuscular Hemoglobin 26.6L, Mean 

Corpuscular Hemoglobin Concent 31.0L, Red Cell Distribution Width 15.5H, 

Platelet Count 144L, Mean Platelet Volume 8.3, Neutrophils (%) (Auto) 78.3H, 

Lymphocytes (%) (Auto) 10.8L, Monocytes (%) (Auto) 8.8, Eosinophils (%) (Auto) 

1.7, Basophils (%) (Auto) 0.5, Prothrombin Time 14.5H, Prothromb Time 

International Ratio 1.4H, Sodium Level 146H, Potassium Level 4.1, Chloride 

Level 108H, Carbon Dioxide Level 33H, Anion Gap 5, Blood Urea Nitrogen 31H, 

Creatinine 0.8, Estimat Glomerular Filtration Rate , Glucose Level 133H, 

Calcium Level 9.3, Magnesium Level 2.2, Pro-B-Type Natriuretic Peptide 1070H





Current Medications








 Medications


  (Trade)  Dose


 Ordered  Sig/Toribio


 Route


 PRN Reason  Start Time


 Stop Time Status Last Admin


Dose Admin


 


 Chlorhexidine


 Gluconate


  (Hanna-Hex 2%)  1 applic  DAILY@2000


 TOPIC


   3/21/19 20:00


 4/18/19 19:59   


 


 


 Dextrose


  (Dextrose 50%)  25 ml  Q30M  PRN


 IV


 Hypoglycemia  3/21/19 07:15


 4/11/19 22:44   


 


 


 Dextrose


  (Dextrose 50%)  50 ml  Q30M  PRN


 IV


 Hypoglycemia  3/21/19 07:15


 4/11/19 22:44   


 


 


 Famotidine


  (Pepcid)  20 mg  BID


 ORAL


   3/21/19 09:00


 4/16/19 17:59  3/21/19 09:22


 


 


 Levofloxacin  150 ml @ 


 150 mls/hr  Q48H


 IVPB


   3/21/19 11:00


 3/26/19 10:59  3/21/19 11:12


 


 


 Magnesium


 Hydroxide


  (Mom)  30 ml  DAILYPRN  PRN


 ORAL


 Constipation  3/21/19 18:45


 4/19/19 18:44   


 


 


 Magnesium Oxide


  (Mag-Ox 400mg)  400 mg  THREE TIMES A  DAY


 ORAL


   3/21/19 09:00


 4/15/19 17:59  3/21/19 09:22


 


 


 Ondansetron HCl


  (Zofran)  4 mg  Q6H  PRN


 IVP


 Nausea & Vomiting  3/21/19 10:45


 4/11/19 22:44   


 


 


 Quetiapine


 Fumarate


  (SEROquel)  25 mg  Q12HR


 ORAL


   3/21/19 09:00


 4/17/19 20:59  3/21/19 09:22


 


 


 Spironolactone


  (Aldactone)  25 mg  DAILY


 ORAL


   3/21/19 09:00


 4/17/19 18:29  3/21/19 09:22


 


 


 Warfarin Sodium


  (Coumadin per


 pharmacy)  1 ea  DAILY  PRN


 MISC


 Per rx protocol  3/21/19 09:00


 4/16/19 00:14   


 

















Nigel Aguayo MD Mar 21, 2019 18:09

## 2019-03-21 NOTE — DIAGNOSTIC IMAGING REPORT
Indication: Dyspnea

 

Comparison:  3/17/2019

 

A single view chest radiograph was obtained.

 

Findings:

 

Patient has been extubated. There is no significant change otherwise accounting for

the slightly lower lung volumes currently. Bronchovascular markings are prominent.

PICC line in nasogastric tubes appear stable. Heart is enlarged but stable.

 

IMPRESSION:

 

Postextubation. No significant change otherwise.

## 2019-03-22 NOTE — PROCEDURE NOTE
DATE OF PROCEDURE:  03/22/2019

SURGEON:  Sancho Buckley M.D.



ANESTHESIA:  Per Dr. Soler.



PROCEDURE:  Upper endoscopy with PEG placement.



INSTRUMENT:  Olympus adult flexible upper endoscope.



REASON FOR PROCEDURE:  The procedure, risks, benefits, and possible

consequences, including hemorrhage, aspiration, perforation and infection,

and alternative treatments, were explained to the patient/legal guardian

by Dr. Sancho Buckley and the patient/legal guardian understood and

accepted these risks.



The procedure, risks, benefits, and possible consequences, including

hemorrhage, aspiration, perforation and infection, and alternative

treatments, were explained to the patient/legal guardian by Dr. Sancho Buckley and the patient/legal guardian understood and accepted these

risks.



INDICATION:  Dysphagia.



DESCRIPTION OF PROCEDURE:  After informed consent was obtained and the

patient was adequately sedated, Olympus upper endoscope was advanced from

the mouth into the second portion of the duodenum and retroflexion was

performed in the stomach.



Then, under endoscopic guidance, under sterile condition, a 20-[place]

pull type of G-tube was successfully placed in the epigastric area.  The

distance from the tip of the tube to skin was about 2.5 cm in size.  The

patient tolerated the procedure very well without any complication.



SUMMARY OF FINDINGS:  Status post successful PEG placement.



RECOMMENDATIONS:

1. Abdominal binder.

2. Elevate the head of the bed at all times.

3. G-tube flush.

4. G-tube care.

5. Start tube feeding later today.

6. The patient was on Levaquin and the patient got a dose today.



I want to thank Dr. Presley Lomax for this kind referral.









  ______________________________________________

  Sancho Buckley M.D.





DR:  ANSELMO

D:  03/22/2019 10:28

T:  03/22/2019 18:02

JOB#:  [id number]

CC:  Presley Lomax M.D.; Fax#:  [phone]

## 2019-03-22 NOTE — PULMONOLOGY PROGRESS NOTE
Assessment/Plan


Assessment/Plan


1. Congestive heart failure.


2. Acute respiratory failure.


3. Hypotension.


4. Pneumonia.


5. Tardive dyskinesia





nods yes/no


PEG today


continue levaquin


cont nebs





Subjective


ROS Limited/Unobtainable:  Yes


Allergies:  


Coded Allergies:  


     No Known Allergies (Unverified , 10/4/16)





Objective





Last 24 Hour Vital Signs








  Date Time  Temp Pulse Resp B/P (MAP) Pulse Ox O2 Delivery O2 Flow Rate FiO2


 


3/22/19 11:20  68 22  98   


 


3/22/19 10:44 97.0 68 21 148/64 96 Room Air  


 


3/22/19 10:40  75 20 139/74 98 Nasal Cannula 3 


 


3/22/19 10:38  76 22  98   


 


3/22/19 10:36 97.2 73 22 145/70 98 Nasal Cannula 3 


 


3/22/19 10:00      Nasal Cannula 2.0 





      Nasal Cannula 2.0 


 


3/22/19 08:00      Nasal Cannula 2.0 





      Nasal Cannula 2.0 


 


3/22/19 08:00 98.0 74 25 154/63 (93) 97   


 


3/22/19 08:00  75      


 


3/22/19 04:00      Nasal Cannula 2.0 





      Nasal Cannula 2.0 


 


3/22/19 04:00  73      


 


3/22/19 04:00 98.3 66 24 118/62 (80) 97   


 


3/21/19 20:00  62      


 


3/21/19 20:00 98.9 79 24 132/54 (80) 95   


 


3/21/19 20:00      Nasal Cannula 2.0 





      Nasal Cannula 2.0 


 


3/21/19 16:00      Nasal Cannula 2.0 





      Nasal Cannula 2.0 


 


3/21/19 16:00 98.9 74 29 144/67 (92) 95   


 


3/21/19 16:00  77      


 


3/21/19 12:00      Nasal Cannula 2.0 





      Nasal Cannula 2.0 


 


3/21/19 12:00 99.1 71 26 112/48 (69) 93   


 


3/21/19 12:00  63      

















Intake and Output  


 


 3/21/19 3/22/19





 19:00 07:00


 


Intake Total 300 ml 


 


Output Total 550 ml 500 ml


 


Balance -250 ml -500 ml


 


  


 


Intake Free Water 50 ml 


 


IV Total 150 ml 


 


Tube Feeding 100 ml 


 


Output Urine Total 550 ml 500 ml








Objective


tardive dyskinesia


General Appearance:  no acute distress


HEENT:  atraumatic, anicteric


Respiratory/Chest:  lungs clear


Cardiovascular:  normal rate


Laboratory Tests


3/22/19 04:00: 


White Blood Count 11.7H, Red Blood Count 4.45, Hemoglobin 11.8L, Hematocrit 37.6

, Mean Corpuscular Volume 84, Mean Corpuscular Hemoglobin 26.4L, Mean 

Corpuscular Hemoglobin Concent 31.3L, Red Cell Distribution Width 15.8H, 

Platelet Count 154, Mean Platelet Volume 7.6, Neutrophils (%) (Auto) 77.7H, 

Lymphocytes (%) (Auto) 12.1L, Monocytes (%) (Auto) 8.7, Eosinophils (%) (Auto) 

1.1, Basophils (%) (Auto) 0.5, Prothrombin Time 14.8H, Prothromb Time 

International Ratio 1.4H, Activated Partial Thromboplast Time 28, Sodium Level 

144, Potassium Level 4.1, Chloride Level 109H, Carbon Dioxide Level 31, Anion 

Gap 5, Blood Urea Nitrogen 25H, Creatinine 0.8, Estimat Glomerular Filtration 

Rate , Glucose Level 92, Calcium Level 9.1





Current Medications








 Medications


  (Trade)  Dose


 Ordered  Sig/Toribio


 Route


 PRN Reason  Start Time


 Stop Time Status Last Admin


Dose Admin


 


 Chlorhexidine


 Gluconate


  (Hanna-Hex 2%)  1 applic  DAILY@2000


 TOPIC


   3/21/19 20:00


 4/18/19 19:59  3/21/19 21:03


 


 


 Dextrose


  (Dextrose 50%)  25 ml  Q30M  PRN


 IV


 Hypoglycemia  3/21/19 07:15


 4/11/19 22:44   


 


 


 Dextrose


  (Dextrose 50%)  50 ml  Q30M  PRN


 IV


 Hypoglycemia  3/21/19 07:15


 4/11/19 22:44   


 


 


 Famotidine


  (Pepcid)  20 mg  BID


 ORAL


   3/21/19 09:00


 4/16/19 17:59  3/21/19 09:22


 


 


 Levofloxacin  150 ml @ 


 150 mls/hr  Q48H


 IVPB


   3/21/19 11:00


 3/26/19 10:59  3/21/19 11:12


 


 


 Magnesium


 Hydroxide


  (Mom)  30 ml  DAILYPRN  PRN


 ORAL


 Constipation  3/21/19 18:45


 4/19/19 18:44   


 


 


 Magnesium Oxide


  (Mag-Ox 400mg)  400 mg  THREE TIMES A  DAY


 ORAL


   3/21/19 09:00


 4/15/19 17:59  3/21/19 09:22


 


 


 Ondansetron HCl


  (Zofran)  4 mg  Q6H  PRN


 IVP


 Nausea & Vomiting  3/21/19 10:45


 4/11/19 22:44   


 


 


 Quetiapine


 Fumarate


  (SEROquel)  25 mg  Q12HR


 ORAL


   3/21/19 09:00


 4/17/19 20:59  3/21/19 09:22


 


 


 Spironolactone


  (Aldactone)  25 mg  DAILY


 ORAL


   3/21/19 09:00


 4/17/19 18:29  3/21/19 09:22


 

















Nigel Aguayo MD Mar 22, 2019 12:00

## 2019-03-22 NOTE — IMMEDIATE POST-OP EVALUATION
Immediate Post-Op Evalulation


Immediate Post-Op Evalulation


Procedure:  EGD PEG tube placement


Date of Evaluation:  Mar 22, 2019


Time of Evaluation:  10:37


IV Fluids:  200


Blood Products:  none


Estimated Blood Loss:  min


Urinary Output:  none


Blood Pressure Systolic:  148


Blood Pressure Diastolic:  74


Pulse Rate:  76


Respiratory Rate:  22


O2 Sat by Pulse Oximetry:  98


Temperature (Fahrenheit):  97.8


Pain Score (1-10):  1


Nausea:  No


Vomiting:  No


Complications


none


Patient Status:  reacts, patent, none


Hydration Status:  adequate











Arthur Soler MD Mar 22, 2019 10:38

## 2019-03-22 NOTE — PRE-PROCEDURE NOTE/ATTESTATION
Pre-Procedure Note/Attestation


Complete Prior to Procedure


Planned Procedure:  not applicable


Procedure Narrative:


egd/peg





Indications for Procedure


Pre-Operative Diagnosis:


dysphagia





Attestation


I attest that I discussed the nature of the procedure; its benefits; risks and 

complications; and alternatives (and the risks and benefits of such alternatives

), prior to the procedure, with the patient (or the patient's legal 

representative).





I attest that, if there was a reasonable possibility of needing a blood 

transfusion, the patient (or the patient's legal representative) was given the 

[facility] standardized written summary, pursuant 

to the [place] Blood Safety Act ([place] Health and Safety Code # 1645, as 

amended).





I attest that I re-evaluated the patient just prior to the surgery and that 

there has been no change in the patient's H&P, except as documented below:











Sancho Buckley MD Mar 22, 2019 08:26

## 2019-03-22 NOTE — ANETHESIA PREOPERATIVE EVAL
Anesthesia Pre-op PMH/ROS


General


Date of Evaluation:  Mar 22, 2019


Time of Evaluation:  10:00


Anesthesiologist:  Bam


ASA Score:  ASA 4


Mallampati Score


Class I : Soft palate, uvula, fauces, pillars visible


Class II: Soft palate, uvula, fauces visible


Class III: Soft palate, base of uvula visible


Class IV: Only hard plate visible


Mallampati Classification:  Class II


Surgeon:  Ina


Diagnosis:  Dysphagia


Surgical Procedure:  EGD PEG tube placement


Anesthesia History:  none


Family History:  no anesthesia problems


Allergies:  


Coded Allergies:  


     No Known Allergies (Unverified , 10/4/16)


Medications:  see eMAR


Patient NPO?:  Yes





Past Medical History


Cardiovascular:  Reports: HTN; 


   Denies: CAD, MI, valve dz, arrhythmia, other


Pulmonary:  Reports: other - erespiratory failure; 


   Denies: asthma, COPD, SHER


Gastrointestinal/Genitourinary:  Reports: GERD, other - dysphagia; 


   Denies: CRI, ESRD


Neurologic/Psychiatric:  Reports: dementia, CVA; 


   Denies: depression/anxiety, TIA, other


Endocrine:  Reports: hypothyroidism; 


   Denies: DM, steroids, other


HEENT:  Reports: cataract (L), cataract (R); 


   Denies: glaucoma, [place] (L), [place] (R), other


Hematology/Immune:  Reports: anemia; 


   Denies: DVT, bleeding disorder, other


Musculoskeletal/Integumentary:  Reports: DJD; 


   Denies: OA, RA, DDD, edema, other


PMH Narrative:


as above


PSxH Narrative:


See H&P





Anesthesia Pre-op Phys. Exam


Physician Exam





Last Vital Signs








  Date Time  Temp Pulse Resp B/P (MAP) Pulse Ox O2 Delivery O2 Flow Rate FiO2


 


3/22/19 08:00 98.0 74 25 154/63 (93) 97   


 


3/22/19 04:00      Nasal Cannula 2.0 





      Nasal Cannula 2.0 


 


3/20/19 20:42        28








Constitutional:  NAD


Neurologic:  CN 2-12 intact


Cardiovascular:  RRR, no M/R/G


Respiratory:  CTA


Gastrointestinal:  S/NT/ND





Airway Exam


Mallampati Score:  Class III


MO:  limited


Neck:  stiff


ROM:  limited


Teeth:  missing


Dentures:  no upper, no lower





Anesthesia Pre-op A/P


Labs





Hematology








Test


  3/22/19


04:00


 


White Blood Count


  11.7 K/UL


(4.8-10.8)  H


 


Red Blood Count


  4.45 M/UL


(4.20-5.40)


 


Hemoglobin


  11.8 G/DL


(12.0-16.0)  L


 


Hematocrit


  37.6 %


(37.0-47.0)


 


Mean Corpuscular Volume 84 FL (80-99)  


 


Mean Corpuscular Hemoglobin


  26.4 PG


(27.0-31.0)  L


 


Mean Corpuscular Hemoglobin


Concent 31.3 G/DL


(32.0-36.0)  L


 


Red Cell Distribution Width


  15.8 %


(11.6-14.8)  H


 


Platelet Count


  154 K/UL


(150-450)


 


Mean Platelet Volume


  7.6 FL


(6.5-10.1)


 


Neutrophils (%) (Auto)


  77.7 %


(45.0-75.0)  H


 


Lymphocytes (%) (Auto)


  12.1 %


(20.0-45.0)  L


 


Monocytes (%) (Auto)


  8.7 %


(1.0-10.0)


 


Eosinophils (%) (Auto)


  1.1 %


(0.0-3.0)


 


Basophils (%) (Auto)


  0.5 %


(0.0-2.0)








Coagulation








Test


  3/22/19


04:00


 


Prothrombin Time


  14.8 SEC


(9.30-11.50)  H


 


Prothromb Time International


Ratio 1.4 (0.9-1.1)


H


 


Activated Partial


Thromboplast Time 28 SEC (23-33)


 








Chemistry








Test


  3/22/19


04:00


 


Sodium Level


  144 MMOL/L


(136-145)


 


Potassium Level


  4.1 MMOL/L


(3.5-5.1)


 


Chloride Level


  109 MMOL/L


()  H


 


Carbon Dioxide Level


  31 MMOL/L


(21-32)


 


Anion Gap


  5 mmol/L


(5-15)


 


Blood Urea Nitrogen


  25 mg/dL


(7-18)  H


 


Creatinine


  0.8 MG/DL


(0.55-1.30)


 


Estimat Glomerular Filtration


Rate  mL/min (>60)  


 


 


Glucose Level


  92 MG/DL


()


 


Calcium Level


  9.1 MG/DL


(8.5-10.1)











Risk Assessment & Plan


Assessment:


ASA 4


Plan:


MAC


Status Change Before Surgery:  No





Pre-Antibiotics


Drug:  Arthur Sanchez MD Mar 22, 2019 10:03

## 2019-03-22 NOTE — CARDIOLOGY REPORT
--------------- APPROVED REPORT --------------





EKG Measurement

Heart [id number]

ME 140P39

[id number]

[id number]

[id number]





Normal sinus rhythm

Cannot rule out Anterior infarct, age undetermined

Abnormal ECG

## 2019-03-22 NOTE — 48 HOUR POST ANESTHESIA EVAL
Post Anesthesia Evaluation


Procedure:  EGD PEG tube placement


Date of Evaluation:  Mar 22, 2019


Time of Evaluation:  11:19


Blood Pressure Systolic:  128


0:  76


Pulse Rate:  68


Respiratory Rate:  22


Temperature (Fahrenheit):  97.6


O2 Sat by Pulse Oximetry:  98


Airway:  patent


Nausea:  No


Vomiting:  No


Pain Intensity:  1


Hydration Status:  adequate


Cardiopulmonary Status:


stable


Mental Status/LOC:  patient returned to baseline


Follow-up Care/Observations:


n/a


Post-Anesthesia Complications:


none


Follow-up care needed:  N/A











Arthur Soler MD Mar 22, 2019 11:20

## 2019-03-22 NOTE — GENERAL PROGRESS NOTE
Assessment/Plan


Problem List:  


(1) Hypomagnesemia


ICD Codes:  E83.42 - Hypomagnesemia


SNOMED:  019598814


(2) HCAP (healthcare-associated pneumonia)


ICD Codes:  J18.9 - Pneumonia, unspecified organism


SNOMED:  406476982


(3) Dyspnea


ICD Codes:  R06.00 - Dyspnea, unspecified


SNOMED:  925806186


(4) Hypokalemia


ICD Codes:  E87.6 - Hypokalemia


SNOMED:  87165924


(5) Alzheimer's dementia


ICD Codes:  G30.9 - Alzheimer's disease, unspecified


SNOMED:  50106379


(6) HTN (hypertension)


ICD Codes:  I10 - Essential (primary) hypertension


SNOMED:  86242002


(7) CVA (cerebral vascular accident)


ICD Codes:  I63.9 - Cerebral infarction, unspecified


SNOMED:  930929182


(8) Respiratory distress


ICD Codes:  R06.00 - Dyspnea, unspecified


SNOMED:  777526184


(9) Peripheral edema


ICD Codes:  R60.9 - Edema, unspecified


SNOMED:  217643565, 187598014


(10) Congestive heart failure (CHF)


ICD Codes:  I50.9 - Heart failure, unspecified


SNOMED:  72111060


(11) Respiratory failure


ICD Codes:  J96.90 - Respiratory failure, unspecified, unspecified whether with 

hypoxia or hypercapnia


SNOMED:  133272595, 822140922


(12) Cellulitis of leg


ICD Codes:  L03.119 - Cellulitis of unspecified part of limb


SNOMED:  084225225


(13) Dementia


ICD Codes:  F03.90 - Unspecified dementia without behavioral disturbance


SNOMED:  76814554


(14) Dysphagia


ICD Codes:  R13.10 - Dysphagia, unspecified


SNOMED:  71878125, 752769205


(15) DVT (deep venous thrombosis)


ICD Codes:  I82.409 - Acute embolism and thrombosis of unspecified deep veins 

of unspecified lower extremity


SNOMED:  349575427


Assessment/Plan


extubated,  anxious to add seroquel, prior on ativan 40 yrs but benzo high risk 

in elderly, frail  cxr better, continue iv antibiotics,change levaquin for 

pseudomonas in sputum  replace K, Mg, titration cardiac meds, full code per 

daughter swollow eval 


failed, daughter agrees to peg, has ivc filter would try off coumadin as high 

risk





Subjective


ROS Limited/Unobtainable:  Yes


Allergies:  


Coded Allergies:  


     No Known Allergies (Unverified , 10/4/16)





Objective





Last 24 Hour Vital Signs








  Date Time  Temp Pulse Resp B/P (MAP) Pulse Ox O2 Delivery O2 Flow Rate FiO2


 


3/22/19 04:00      Nasal Cannula 2.0 





      Nasal Cannula 2.0 


 


3/22/19 04:00  73      


 


3/22/19 04:00 98.3 66 24 118/62 (80) 97   


 


3/21/19 20:00  62      


 


3/21/19 20:00 98.9 79 24 132/54 (80) 95   


 


3/21/19 20:00      Nasal Cannula 2.0 





      Nasal Cannula 2.0 


 


3/21/19 16:00      Nasal Cannula 2.0 





      Nasal Cannula 2.0 


 


3/21/19 16:00 98.9 74 29 144/67 (92) 95   


 


3/21/19 16:00  77      


 


3/21/19 12:00      Nasal Cannula 2.0 





      Nasal Cannula 2.0 


 


3/21/19 12:00 99.1 71 26 112/48 (69) 93   


 


3/21/19 12:00  63      


 


3/21/19 09:45      Nasal Cannula 2.0 





      Nasal Cannula 2.0 

















Intake and Output  


 


 3/21/19 3/22/19





 19:00 07:00


 


Intake Total 300 ml 


 


Output Total 550 ml 500 ml


 


Balance -250 ml -500 ml


 


  


 


Intake Free Water 50 ml 


 


IV Total 150 ml 


 


Tube Feeding 100 ml 


 


Output Urine Total 550 ml 500 ml








Laboratory Tests


3/22/19 04:00: 


White Blood Count 11.7H, Red Blood Count 4.45, Hemoglobin 11.8L, Hematocrit 37.6

, Mean Corpuscular Volume 84, Mean Corpuscular Hemoglobin 26.4L, Mean 

Corpuscular Hemoglobin Concent 31.3L, Red Cell Distribution Width 15.8H, 

Platelet Count 154, Mean Platelet Volume 7.6, Neutrophils (%) (Auto) 77.7H, 

Lymphocytes (%) (Auto) 12.1L, Monocytes (%) (Auto) 8.7, Eosinophils (%) (Auto) 

1.1, Basophils (%) (Auto) 0.5, Prothrombin Time 14.8H, Prothromb Time 

International Ratio 1.4H, Activated Partial Thromboplast Time 28, Sodium Level 

144, Potassium Level 4.1, Chloride Level 109H, Carbon Dioxide Level 31, Anion 

Gap 5, Blood Urea Nitrogen 25H, Creatinine 0.8, Estimat Glomerular Filtration 

Rate , Glucose Level 92, Calcium Level 9.1


Height (Feet):  5


Height (Inches):  6.00


Weight (Pounds):  170


General Appearance:  confused, mild distress


EENT:  normal ENT inspection


Neck:  normal alignment


Cardiovascular:  normal rate, regular rhythm


Respiratory/Chest:  lungs clear, normal breath sounds


Abdomen:  soft, no organomegaly


Edema:  trace edema, mild edema


Neurologic:  disoriented











Presley Lomax MD Mar 22, 2019 08:04

## 2019-03-22 NOTE — ENDOSCOPY PROCEDURE NOTE
Endoscopy Procedure Note


General


Indication for Procedure:  dysphagia


Procedures Performed:  EGD, PEG


Operative Findings/Diagnosis:  gastritis


Specimen:  none


Pt Tolerated Procedure Well:  Yes


Estimated Blood Loss:  none





Anesthesia


Anesthesiologist:  janine


Anesthesia:  MAC





Inserted Devices


Implant(s) used?:  No





GI Core Measures


50 yrs or older w/o bx or poly:  Not Applicable


10yrs. F/U not recommended:  Not Applicable











Sancho Buckley MD Mar 22, 2019 10:29

## 2019-03-22 NOTE — GENERAL PROGRESS NOTE
Assessment/Plan


Problem List:  


(1) DVT (deep venous thrombosis)


ICD Codes:  I82.409 - Acute embolism and thrombosis of unspecified deep veins 

of unspecified lower extremity


SNOMED:  233058923


(2) Dysphagia


ICD Codes:  R13.10 - Dysphagia, unspecified


SNOMED:  69734514, 771904811


(3) Dementia


ICD Codes:  F03.90 - Unspecified dementia without behavioral disturbance


SNOMED:  97628936


(4) HCAP (healthcare-associated pneumonia)


ICD Codes:  J18.9 - Pneumonia, unspecified organism


SNOMED:  337744012


(5) HTN (hypertension)


ICD Codes:  I10 - Essential (primary) hypertension


SNOMED:  98114730


Assessment/Plan


dc coumadin


INR 1.4 today


plan PEG for today





Subjective


ROS Limited/Unobtainable:  No


Allergies:  


Coded Allergies:  


     No Known Allergies (Unverified , 10/4/16)





Objective





Last 24 Hour Vital Signs








  Date Time  Temp Pulse Resp B/P (MAP) Pulse Ox O2 Delivery O2 Flow Rate FiO2


 


3/22/19 04:00      Nasal Cannula 2.0 





      Nasal Cannula 2.0 


 


3/22/19 04:00  73      


 


3/22/19 04:00 98.3 66 24 118/62 (80) 97   


 


3/21/19 20:00  62      


 


3/21/19 20:00 98.9 79 24 132/54 (80) 95   


 


3/21/19 20:00      Nasal Cannula 2.0 





      Nasal Cannula 2.0 


 


3/21/19 16:00      Nasal Cannula 2.0 





      Nasal Cannula 2.0 


 


3/21/19 16:00 98.9 74 29 144/67 (92) 95   


 


3/21/19 16:00  77      


 


3/21/19 12:00      Nasal Cannula 2.0 





      Nasal Cannula 2.0 


 


3/21/19 12:00 99.1 71 26 112/48 (69) 93   


 


3/21/19 12:00  63      


 


3/21/19 09:45      Nasal Cannula 2.0 





      Nasal Cannula 2.0 

















Intake and Output  


 


 3/21/19 3/22/19





 19:00 07:00


 


Intake Total 300 ml 


 


Output Total 550 ml 500 ml


 


Balance -250 ml -500 ml


 


  


 


Intake Free Water 50 ml 


 


IV Total 150 ml 


 


Tube Feeding 100 ml 


 


Output Urine Total 550 ml 500 ml








Laboratory Tests


3/22/19 04:00: 


White Blood Count 11.7H, Red Blood Count 4.45, Hemoglobin 11.8L, Hematocrit 37.6

, Mean Corpuscular Volume 84, Mean Corpuscular Hemoglobin 26.4L, Mean 

Corpuscular Hemoglobin Concent 31.3L, Red Cell Distribution Width 15.8H, 

Platelet Count 154, Mean Platelet Volume 7.6, Neutrophils (%) (Auto) 77.7H, 

Lymphocytes (%) (Auto) 12.1L, Monocytes (%) (Auto) 8.7, Eosinophils (%) (Auto) 

1.1, Basophils (%) (Auto) 0.5, Prothrombin Time 14.8H, Prothromb Time 

International Ratio 1.4H, Activated Partial Thromboplast Time 28, Sodium Level 

144, Potassium Level 4.1, Chloride Level 109H, Carbon Dioxide Level 31, Anion 

Gap 5, Blood Urea Nitrogen 25H, Creatinine 0.8, Estimat Glomerular Filtration 

Rate , Glucose Level 92, Calcium Level 9.1


Height (Feet):  5


Height (Inches):  6.00


Weight (Pounds):  170


General Appearance:  lethargic


EENT:  normal ENT inspection


Neck:  supple


Cardiovascular:  normal rate


Respiratory/Chest:  decreased breath sounds


Abdomen:  normal bowel sounds, non tender, soft


Extremities:  non-tender











Sancho Buckley MD Mar 22, 2019 08:25

## 2019-03-22 NOTE — PROGRESS NOTE
DATE:  03/22/2019

CARDIOLOGY PROGRESS NOTE:



SUBJECTIVE:  The patient is status post PEG.  No complications.  Now

alert.



OBJECTIVE:

VITAL SIGNS:  Blood pressure 148/64, pulse 68, respirations 21, afebrile.

LUNGS:  Coarse breath sounds.  No wheezing.

HEART:  Regular rhythm and rate.  Normal S1, S2.

ABDOMEN:  Soft.  G-tube site intact.

EXTREMITIES:  Trace edema.



LABORATORY DATA:  INR 1.4.



IMPRESSION:

1. Respiratory failure.

2. Dysphagia, status post PEG.

3. Dehydration.

4. Hyponatremia, now corrected.

5. Acute on chronic diastolic congestive heart failure.

6. Acute on chronic DVT of lower extremities.

7. CVA with left-sided weakness.



PLAN:

1. Resume warfarin to INR goal 2 to 3.

2. Respiratory hygiene.

3. Reassess for diuresis based on clinical parameters titrating

cardiovascular regimen.









  ______________________________________________

  Misbah Francis M.D.





DR:  ISAURO

D:  03/22/2019 12:26

T:  03/22/2019 19:04

JOB#:  [id number]

CC:

## 2019-03-22 NOTE — PROGRESS NOTE
DATE:  03/21/2019

CARDIOLOGY PROGRESS NOTE



Late entry for 03/21/2019.



SUBJECTIVE:  The patient is seen and evaluated.  Plans for PEG _____

discussed tomorrow due to advanced dysphagia.  The patient is responsive.

Off the ventilator.



OBJECTIVE:

VITAL SIGNS:  Blood pressure 148/64, pulse 68, and respirations 21.

LUNGS:  Coarse breath sounds.

HEART:  Regular rhythm and rate.  Normal S1, S2.

ABDOMEN:  Soft.

EXTREMITIES:  No edema.



LABORATORY DATA:  White count 12 and hemoglobin 11.  Potassium 4.1, sodium

146, bicarb 33, BUN 31, and creatinine 0.8.  INR 1.4.



IMPRESSION:

1. Respiratory failure.

2. Acute on chronic diastolic congestive heart failure.

3. Acute on chronic DVT.

4. Coagulopathy secondary to warfarin.

5. Dehydration.

6. Hypernatremia.

7. Mild to moderate aortic stenosis.



PLAN:

1. Continue free water replacement.

2. Holding diuretics.

3. Respiratory hygiene.

4. Hold warfarin.

5. Pending percutaneous endoscopic gastrostomy.

6. Avoid tight BP control.









  ______________________________________________

  Misbah Francis M.D.





DR:  WILLIE

D:  03/22/2019 12:22

T:  03/22/2019 18:57

JOB#:  [id number]

CC:



[place]

## 2019-03-23 NOTE — GENERAL PROGRESS NOTE
Assessment/Plan


Problem List:  


(1) Hypomagnesemia


ICD Codes:  E83.42 - Hypomagnesemia


SNOMED:  878589208


(2) HCAP (healthcare-associated pneumonia)


ICD Codes:  J18.9 - Pneumonia, unspecified organism


SNOMED:  113323012


(3) Dyspnea


ICD Codes:  R06.00 - Dyspnea, unspecified


SNOMED:  433664822


(4) Hypokalemia


ICD Codes:  E87.6 - Hypokalemia


SNOMED:  77527726


(5) Alzheimer's dementia


ICD Codes:  G30.9 - Alzheimer's disease, unspecified


SNOMED:  82817757


(6) HTN (hypertension)


ICD Codes:  I10 - Essential (primary) hypertension


SNOMED:  99570329


(7) CVA (cerebral vascular accident)


ICD Codes:  I63.9 - Cerebral infarction, unspecified


SNOMED:  485684452


(8) Respiratory distress


ICD Codes:  R06.00 - Dyspnea, unspecified


SNOMED:  350372377


(9) Peripheral edema


ICD Codes:  R60.9 - Edema, unspecified


SNOMED:  986771624, 277693802


(10) Congestive heart failure (CHF)


ICD Codes:  I50.9 - Heart failure, unspecified


SNOMED:  73590426


(11) Respiratory failure


ICD Codes:  J96.90 - Respiratory failure, unspecified, unspecified whether with 

hypoxia or hypercapnia


SNOMED:  070480798, 681779614


(12) Cellulitis of leg


ICD Codes:  L03.119 - Cellulitis of unspecified part of limb


SNOMED:  787190756


(13) Dementia


ICD Codes:  F03.90 - Unspecified dementia without behavioral disturbance


SNOMED:  67923175


(14) Dysphagia


ICD Codes:  R13.10 - Dysphagia, unspecified


SNOMED:  01757673, 250817388


(15) DVT (deep venous thrombosis)


ICD Codes:  I82.409 - Acute embolism and thrombosis of unspecified deep veins 

of unspecified lower extremity


SNOMED:  668530224


Assessment/Plan


extubated,  anxious to add seroquel, prior on ativan 40 yrs but benzo high risk 

in elderly, frail  cxr better, continue iv antibiotics,change levaquin for 

pseudomonas in sputum  replace K, Mg, titration cardiac meds, full code per 

daughter swollow eval 


failed, daughter agrees to peg, has ivc filter would try off coumadin as high 

risk


start tube feed, dc planning





Subjective


ROS Limited/Unobtainable:  Yes


Allergies:  


Coded Allergies:  


     No Known Allergies (Unverified , 10/4/16)





Objective





Last 24 Hour Vital Signs








  Date Time  Temp Pulse Resp B/P (MAP) Pulse Ox O2 Delivery O2 Flow Rate FiO2


 


3/23/19 16:00  106      


 


3/23/19 16:00      Room Air  





      Room Air  


 


3/23/19 12:00  66      


 


3/23/19 12:00 97.9 81 20 113/63 (80) 98   


 


3/23/19 11:58      Room Air  





      Room Air  


 


3/23/19 08:00  86      


 


3/23/19 08:00      Room Air  





      Room Air  


 


3/23/19 08:00 97.7 87 20 114/54 (74) 97   


 


3/23/19 04:00      Room Air  





      Room Air  


 


3/23/19 04:00  69      


 


3/23/19 04:00 98.1 74 20 123/61 (81) 98   


 


3/23/19 00:00  69      


 


3/23/19 00:00 97.7 79 20 104/58 (73) 92   


 


3/23/19 00:00      Room Air  





      Room Air  


 


3/22/19 20:00  93      


 


3/22/19 20:00      Room Air  





      Room Air  


 


3/22/19 20:00 99.4 81 20 135/56 (82) 95   

















Intake and Output  


 


 3/22/19 3/23/19





 18:59 06:59


 


Intake Total 870 ml 710 ml


 


Output Total 200 ml 500 ml


 


Balance 670 ml 210 ml


 


  


 


Intake Free Water 170 ml 140 ml


 


IV Total 250 ml 


 


Tube Feeding 450 ml 570 ml


 


Output Urine Total 200 ml 500 ml


 


Estimated Blood Loss 0 ml 


 


# Voids 2 








Laboratory Tests


3/23/19 04:30: 


White Blood Count 19.0#H, Red Blood Count 4.29, Hemoglobin 11.5L, Hematocrit 

36.2L, Mean Corpuscular Volume 84, Mean Corpuscular Hemoglobin 26.8L, Mean 

Corpuscular Hemoglobin Concent 31.7L, Red Cell Distribution Width 15.8H, 

Platelet Count 161, Mean Platelet Volume 7.9, Neutrophils (%) (Auto) , 

Lymphocytes (%) (Auto) , Monocytes (%) (Auto) , Eosinophils (%) (Auto) , 

Basophils (%) (Auto) , Differential Total Cells Counted 100, Neutrophils % (

Manual) 85H, Lymphocytes % (Manual) 7L, Monocytes % (Manual) 7, Eosinophils % (

Manual) 1, Basophils % (Manual) 0, Band Neutrophils 0, Platelet Estimate 

Adequate, Platelet Morphology Normal, Anisocytosis 1+, Sodium Level 145, 

Potassium Level 3.9, Chloride Level 110H, Carbon Dioxide Level 31, Anion Gap 4L

, Blood Urea Nitrogen 31H, Creatinine 0.8, Estimat Glomerular Filtration Rate , 

Glucose Level 137H, Calcium Level 9.0


Height (Feet):  5


Height (Inches):  6.00


Weight (Pounds):  174


General Appearance:  alert, confused


EENT:  normal ENT inspection


Neck:  normal alignment


Cardiovascular:  normal rate


Respiratory/Chest:  rhonchi - bilaterally


Abdomen:  non tender


Extremities:  non-tender


Edema:  trace edema


Neurologic:  disoriented











Presley Lomax MD Mar 23, 2019 16:33

## 2019-03-23 NOTE — PULMONOLOGY PROGRESS NOTE
Assessment/Plan


Assessment/Plan


1. Congestive heart failure.


2. Acute respiratory failure.


3. Hypotension.


4. Pneumonia.


5. Tardive dyskinesia





o2


aspiration precautions


PEG npo and TF


continue levaquin


cont nebs


cxr monday





Subjective


ROS Limited/Unobtainable:  Yes


Allergies:  


Coded Allergies:  


     No Known Allergies (Unverified , 10/4/16)


Subjective


on o2


stable


npo on tf


nonverbal


no distress


no fever


not getting oob





Objective





Last 24 Hour Vital Signs








  Date Time  Temp Pulse Resp B/P (MAP) Pulse Ox O2 Delivery O2 Flow Rate FiO2


 


3/23/19 16:00  106      


 


3/23/19 16:00      Room Air  





      Room Air  


 


3/23/19 16:00 98.4 94 28 133/70 (91) 98   


 


3/23/19 12:00  66      


 


3/23/19 12:00 97.9 81 20 113/63 (80) 98   


 


3/23/19 11:58      Room Air  





      Room Air  


 


3/23/19 08:00  86      


 


3/23/19 08:00      Room Air  





      Room Air  


 


3/23/19 08:00 97.7 87 20 114/54 (74) 97   


 


3/23/19 04:00      Room Air  





      Room Air  


 


3/23/19 04:00  69      


 


3/23/19 04:00 98.1 74 20 123/61 (81) 98   


 


3/23/19 00:00  69      


 


3/23/19 00:00 97.7 79 20 104/58 (73) 92   


 


3/23/19 00:00      Room Air  





      Room Air  


 


3/22/19 20:00  93      


 


3/22/19 20:00      Room Air  





      Room Air  


 


3/22/19 20:00 99.4 81 20 135/56 (82) 95   

















Intake and Output  


 


 3/22/19 3/23/19





 18:59 06:59


 


Intake Total 870 ml 710 ml


 


Output Total 200 ml 500 ml


 


Balance 670 ml 210 ml


 


  


 


Intake Free Water 170 ml 140 ml


 


IV Total 250 ml 


 


Tube Feeding 450 ml 570 ml


 


Output Urine Total 200 ml 500 ml


 


Estimated Blood Loss 0 ml 


 


# Voids 2 








General Appearance:  cachetic


Respiratory/Chest:  rhonchi


Cardiovascular:  normal rate, regular rhythm, murmur systolic


Abdomen:  soft, non tender, no organomegaly


Extremities:  no cyanosis


Neurologic/Psychiatric:  responsive


Lymphatic:  no groin adenopathy


Laboratory Tests


3/23/19 04:30: 


White Blood Count 19.0#H, Red Blood Count 4.29, Hemoglobin 11.5L, Hematocrit 

36.2L, Mean Corpuscular Volume 84, Mean Corpuscular Hemoglobin 26.8L, Mean 

Corpuscular Hemoglobin Concent 31.7L, Red Cell Distribution Width 15.8H, 

Platelet Count 161, Mean Platelet Volume 7.9, Neutrophils (%) (Auto) , 

Lymphocytes (%) (Auto) , Monocytes (%) (Auto) , Eosinophils (%) (Auto) , 

Basophils (%) (Auto) , Differential Total Cells Counted 100, Neutrophils % (

Manual) 85H, Lymphocytes % (Manual) 7L, Monocytes % (Manual) 7, Eosinophils % (

Manual) 1, Basophils % (Manual) 0, Band Neutrophils 0, Platelet Estimate 

Adequate, Platelet Morphology Normal, Anisocytosis 1+, Sodium Level 145, 

Potassium Level 3.9, Chloride Level 110H, Carbon Dioxide Level 31, Anion Gap 4L

, Blood Urea Nitrogen 31H, Creatinine 0.8, Estimat Glomerular Filtration Rate , 

Glucose Level 137H, Calcium Level 9.0





Current Medications








 Medications


  (Trade)  Dose


 Ordered  Sig/Toribio


 Route


 PRN Reason  Start Time


 Stop Time Status Last Admin


Dose Admin


 


 Chlorhexidine


 Gluconate


  (Hanna-Hex 2%)  1 applic  DAILY@2000


 TOPIC


   3/23/19 20:00


 4/18/19 19:59   


 


 


 Dextrose


  (Dextrose 50%)  25 ml  Q30M  PRN


 IV


 Hypoglycemia  3/23/19 19:15


 4/11/19 22:44   


 


 


 Dextrose


  (Dextrose 50%)  50 ml  Q30M  PRN


 IV


 Hypoglycemia  3/23/19 19:15


 4/11/19 22:44   


 


 


 Famotidine


  (Pepcid)  20 mg  BID


 ORAL


   3/24/19 09:00


 4/16/19 17:59   


 


 


 Furosemide


  (Lasix)  40 mg  DAILY


 ORAL


   3/24/19 09:00


 4/23/19 08:59   


 


 


 Levofloxacin  150 ml @ 


 150 mls/hr  Q48H


 IVPB


   3/25/19 11:00


 3/26/19 10:59   


 


 


 Magnesium


 Hydroxide


  (Mom)  30 ml  DAILYPRN  PRN


 ORAL


 Constipation  3/24/19 18:45


 4/19/19 18:44   


 


 


 Magnesium Oxide


  (Mag-Ox 400mg)  400 mg  THREE TIMES A  DAY


 ORAL


   3/24/19 09:00


 4/15/19 17:59   


 


 


 Ondansetron HCl


  (Zofran)  4 mg  Q6H  PRN


 IVP


 Nausea & Vomiting  3/23/19 17:30


 4/11/19 17:29   


 


 


 Quetiapine


 Fumarate


  (SEROquel)  25 mg  Q12HR


 ORAL


   3/23/19 21:00


 4/17/19 20:59   


 


 


 Spironolactone


  (Aldactone)  25 mg  DAILY


 ORAL


   3/24/19 09:00


 4/17/19 18:29   


 

















Melyssa Reinoso DO Mar 23, 2019 19:50

## 2019-03-23 NOTE — GENERAL PROGRESS NOTE
Assessment/Plan


Assessment/Plan


Assessment


(1) DVT (deep venous thrombosis)


ICD Codes:  I82.409 - Acute embolism and thrombosis of unspecified deep veins 

of unspecified lower extremity


SNOMED:  379879393


(2) Dysphagia


ICD Codes:  R13.10 - Dysphagia, unspecified


SNOMED:  74016598, 341742340


(3) Dementia


ICD Codes:  F03.90 - Unspecified dementia without behavioral disturbance


SNOMED:  79330440


(4) HCAP (healthcare-associated pneumonia)


ICD Codes:  J18.9 - Pneumonia, unspecified organism


SNOMED:  530434623


(5) HTN (hypertension)


ICD Codes:  I10 - Essential (primary) hypertension


SNOMED:  42736422





Recommendations


- TF


- GT care


- Elevate HOB


- anticoagulation per primary team





Subjective


Allergies:  


Coded Allergies:  


     No Known Allergies (Unverified , 10/4/16)


Subjective


awake


minimally interactive


POD # 1 s/p PEG





Objective





Last 24 Hour Vital Signs








  Date Time  Temp Pulse Resp B/P (MAP) Pulse Ox O2 Delivery O2 Flow Rate FiO2


 


3/23/19 16:00  106      


 


3/23/19 16:00      Room Air  





      Room Air  


 


3/23/19 16:00 98.4 94 28 133/70 (91) 98   


 


3/23/19 12:00  66      


 


3/23/19 12:00 97.9 81 20 113/63 (80) 98   


 


3/23/19 11:58      Room Air  





      Room Air  


 


3/23/19 08:00  86      


 


3/23/19 08:00      Room Air  





      Room Air  


 


3/23/19 08:00 97.7 87 20 114/54 (74) 97   


 


3/23/19 04:00      Room Air  





      Room Air  


 


3/23/19 04:00  69      


 


3/23/19 04:00 98.1 74 20 123/61 (81) 98   


 


3/23/19 00:00  69      


 


3/23/19 00:00 97.7 79 20 104/58 (73) 92   


 


3/23/19 00:00      Room Air  





      Room Air  


 


3/22/19 20:00  93      


 


3/22/19 20:00      Room Air  





      Room Air  


 


3/22/19 20:00 99.4 81 20 135/56 (82) 95   

















Intake and Output  


 


 3/22/19 3/23/19





 18:59 06:59


 


Intake Total 870 ml 710 ml


 


Output Total 200 ml 500 ml


 


Balance 670 ml 210 ml


 


  


 


Intake Free Water 170 ml 140 ml


 


IV Total 250 ml 


 


Tube Feeding 450 ml 570 ml


 


Output Urine Total 200 ml 500 ml


 


Estimated Blood Loss 0 ml 


 


# Voids 2 








Laboratory Tests


3/23/19 04:30: 


White Blood Count 19.0#H, Red Blood Count 4.29, Hemoglobin 11.5L, Hematocrit 

36.2L, Mean Corpuscular Volume 84, Mean Corpuscular Hemoglobin 26.8L, Mean 

Corpuscular Hemoglobin Concent 31.7L, Red Cell Distribution Width 15.8H, 

Platelet Count 161, Mean Platelet Volume 7.9, Neutrophils (%) (Auto) , 

Lymphocytes (%) (Auto) , Monocytes (%) (Auto) , Eosinophils (%) (Auto) , 

Basophils (%) (Auto) , Differential Total Cells Counted 100, Neutrophils % (

Manual) 85H, Lymphocytes % (Manual) 7L, Monocytes % (Manual) 7, Eosinophils % (

Manual) 1, Basophils % (Manual) 0, Band Neutrophils 0, Platelet Estimate 

Adequate, Platelet Morphology Normal, Anisocytosis 1+, Sodium Level 145, 

Potassium Level 3.9, Chloride Level 110H, Carbon Dioxide Level 31, Anion Gap 4L

, Blood Urea Nitrogen 31H, Creatinine 0.8, Estimat Glomerular Filtration Rate , 

Glucose Level 137H, Calcium Level 9.0


Height (Feet):  5


Height (Inches):  6.00


Weight (Pounds):  174


Objective


WDWN elderly WW


NCAT


supple


CTA


RR


soft ND NT, (+) PEG


no edema











Denver Montes MD Mar 23, 2019 17:15

## 2019-03-24 NOTE — GENERAL PROGRESS NOTE
Assessment/Plan


Assessment/Plan


Assessment


(1) DVT (deep venous thrombosis)


ICD Codes:  I82.409 - Acute embolism and thrombosis of unspecified deep veins 

of unspecified lower extremity


SNOMED:  926708019


(2) Dysphagia


ICD Codes:  R13.10 - Dysphagia, unspecified


SNOMED:  82012078, 729935134


(3) Dementia


ICD Codes:  F03.90 - Unspecified dementia without behavioral disturbance


SNOMED:  40174852


(4) HCAP (healthcare-associated pneumonia)


ICD Codes:  J18.9 - Pneumonia, unspecified organism


SNOMED:  137155370


(5) HTN (hypertension)


ICD Codes:  I10 - Essential (primary) hypertension


SNOMED:  13200507


(6) Leukocytosis





Recommendations


- TF


- GT care


- Elevate HOB


- follow labs


- anticoagulation per primary team





Subjective


Allergies:  


Coded Allergies:  


     No Known Allergies (Unverified , 10/4/16)


Subjective


awake


minimally interactive


POD # 2 s/p PEG





Objective





Last 24 Hour Vital Signs








  Date Time  Temp Pulse Resp B/P (MAP) Pulse Ox O2 Delivery O2 Flow Rate FiO2


 


3/24/19 17:06 98.4 104 21 115/63 (80) 95   


 


3/24/19 12:24 98.0 85 22 115/88 (97) 94   


 


3/24/19 08:00 96.4 95 24 143/63 (89) 94   


 


3/24/19 04:00 99.1 105 23 141/71 (94) 93   


 


3/24/19 00:00 99.3 105 24 140/68 (92) 95   


 


3/23/19 21:00      Room Air  





      Room Air  

















Intake and Output  


 


 3/23/19 3/24/19





 19:00 07:00


 


Intake Total 1120 ml 1110 ml


 


Output Total 1275 ml 300 ml


 


Balance -155 ml 810 ml


 


  


 


Intake Free Water 230 ml 450 ml


 


Tube Feeding 650 ml 660 ml


 


Other 240 ml 


 


Output Urine Total 1275 ml 300 ml








Laboratory Tests


3/24/19 06:41: 


White Blood Count 17.8H, Red Blood Count 4.51, Hemoglobin 12.2, Hematocrit 38.8

, Mean Corpuscular Volume 86, Mean Corpuscular Hemoglobin 27.1, Mean 

Corpuscular Hemoglobin Concent 31.5L, Red Cell Distribution Width 16.6H, 

Platelet Count 164, Mean Platelet Volume 7.7, Neutrophils (%) (Auto) 83.9H, 

Lymphocytes (%) (Auto) 7.0L, Monocytes (%) (Auto) 8.3, Eosinophils (%) (Auto) 

0.6, Basophils (%) (Auto) 0.3, Prothrombin Time 12.6H, Prothromb Time 

International Ratio 1.2H, Sodium Level 146H, Potassium Level 4.1, Chloride 

Level 106, Carbon Dioxide Level 32, Anion Gap 8, Blood Urea Nitrogen 32H, 

Creatinine 0.9, Estimat Glomerular Filtration Rate , Glucose Level 118H, 

Calcium Level 9.7, Pro-B-Type Natriuretic Peptide 603H


Height (Feet):  5


Height (Inches):  6.00


Weight (Pounds):  180


Objective


WDWN elderly WW


NCAT


supple


CTA


RR


soft ND NT, (+) PEG


no edema











Denver Montes MD Mar 24, 2019 20:08

## 2019-03-24 NOTE — PULMONOLOGY PROGRESS NOTE
Assessment/Plan


Assessment/Plan


1. Congestive heart failure.


2. Acute respiratory failure.


3. Hypotension.


4. Pneumonia.


5. Tardive dyskinesia





o2


aspiration precautions


PEG npo and TF


continue levaquin


cont nebs


cxr monday





Subjective


ROS Limited/Unobtainable:  Yes


Allergies:  


Coded Allergies:  


     No Known Allergies (Unverified , 10/4/16)


Subjective


no events over night


on o2


stable


npo on tf


nonverbal


no distress


no fever


not getting oob





Objective





Last 24 Hour Vital Signs








  Date Time  Temp Pulse Resp B/P (MAP) Pulse Ox O2 Delivery O2 Flow Rate FiO2


 


3/24/19 17:06 98.4 104 21 115/63 (80) 95   


 


3/24/19 12:24 98.0 85 22 115/88 (97) 94   


 


3/24/19 08:00 96.4 95 24 143/63 (89) 94   


 


3/24/19 04:00 99.1 105 23 141/71 (94) 93   


 


3/24/19 00:00 99.3 105 24 140/68 (92) 95   


 


3/23/19 21:00      Room Air  





      Room Air  

















Intake and Output  


 


 3/23/19 3/24/19





 19:00 07:00


 


Intake Total 1120 ml 1110 ml


 


Output Total 1275 ml 300 ml


 


Balance -155 ml 810 ml


 


  


 


Intake Free Water 230 ml 450 ml


 


Tube Feeding 650 ml 660 ml


 


Other 240 ml 


 


Output Urine Total 1275 ml 300 ml








General Appearance:  cachetic


Respiratory/Chest:  rhonchi


Cardiovascular:  normal rate, regular rhythm


Abdomen:  normal bowel sounds, no organomegaly


Extremities:  no cyanosis


Neurologic/Psychiatric:  alert, disoriented


Laboratory Tests


3/24/19 06:41: 


White Blood Count 17.8H, Red Blood Count 4.51, Hemoglobin 12.2, Hematocrit 38.8

, Mean Corpuscular Volume 86, Mean Corpuscular Hemoglobin 27.1, Mean 

Corpuscular Hemoglobin Concent 31.5L, Red Cell Distribution Width 16.6H, 

Platelet Count 164, Mean Platelet Volume 7.7, Neutrophils (%) (Auto) 83.9H, 

Lymphocytes (%) (Auto) 7.0L, Monocytes (%) (Auto) 8.3, Eosinophils (%) (Auto) 

0.6, Basophils (%) (Auto) 0.3, Prothrombin Time 12.6H, Prothromb Time 

International Ratio 1.2H, Sodium Level 146H, Potassium Level 4.1, Chloride 

Level 106, Carbon Dioxide Level 32, Anion Gap 8, Blood Urea Nitrogen 32H, 

Creatinine 0.9, Estimat Glomerular Filtration Rate , Glucose Level 118H, 

Calcium Level 9.7, Pro-B-Type Natriuretic Peptide 603H





Current Medications








 Medications


  (Trade)  Dose


 Ordered  Sig/Toribio


 Route


 PRN Reason  Start Time


 Stop Time Status Last Admin


Dose Admin


 


 Chlorhexidine


 Gluconate


  (Hanna-Hex 2%)  1 applic  DAILY@2000


 TOPIC


   3/23/19 20:00


 4/18/19 19:59   


 


 


 Dextrose


  (Dextrose 50%)  25 ml  Q30M  PRN


 IV


 Hypoglycemia  3/23/19 19:15


 4/11/19 22:44   


 


 


 Dextrose


  (Dextrose 50%)  50 ml  Q30M  PRN


 IV


 Hypoglycemia  3/23/19 19:15


 4/11/19 22:44   


 


 


 Famotidine


  (Pepcid)  20 mg  BID


 ORAL


   3/24/19 09:00


 4/16/19 17:59  3/24/19 18:27


 


 


 Furosemide


  (Lasix)  40 mg  DAILY


 ORAL


   3/24/19 09:00


 4/23/19 08:59  3/24/19 09:37


 


 


 Levofloxacin


  (Levaquin)  750 mg  Q48H


 ORAL


   3/25/19 14:00


 4/1/19 13:59   


 


 


 Magnesium


 Hydroxide


  (Mom)  30 ml  DAILYPRN  PRN


 ORAL


 Constipation  3/24/19 18:45


 4/19/19 18:44   


 


 


 Magnesium Oxide


  (Mag-Ox 400mg)  400 mg  THREE TIMES A  DAY


 ORAL


   3/24/19 09:00


 4/15/19 17:59  3/24/19 18:27


 


 


 Ondansetron HCl


  (Zofran)  4 mg  Q6H  PRN


 IVP


 Nausea & Vomiting  3/23/19 17:30


 4/11/19 17:29   


 


 


 Quetiapine


 Fumarate


  (SEROquel)  25 mg  Q12HR


 ORAL


   3/23/19 21:00


 4/17/19 20:59  3/24/19 09:37


 


 


 Spironolactone


  (Aldactone)  25 mg  DAILY


 ORAL


   3/24/19 09:00


 4/17/19 18:29  3/24/19 09:36


 

















Melyssa Reinoso 24, 2019 20:54

## 2019-03-24 NOTE — PROGRESS NOTE
DATE:  03/23/2019

CARDIOLOGY PROGRESS NOTE



SUBJECTIVE:  The patient is status post PEG.  She is on feedings now by

G-tube.  Still with some cough and congestion.  No chest pain.



OBJECTIVE:

VITAL SIGNS:  Blood pressure 133/70, heart rate 66 to 94, respiratory 20 to

28, and afebrile.

LUNGS:  Coarse breath sounds.  Scattered rhonchi.

HEART:  Regular rhythm and rate.  Normal S1 and S2.

ABDOMEN:  Soft.

EXTREMITIES:  Trace edema.

NEUROLOGIC:  Left hemiparesis.



LABORATORY DATA:  G-tube intact.  White count 19 and hemoglobin 11.5.

Potassium 3.9, BUN 31, and creatinine 0.8.



IMPRESSION:

1. Status post respiratory failure.

2. Dysphagia, status post G-tube.

3. DVT.

4. Leukocytosis.

5. Acute on chronic diastolic congestive heart failure.

6. Degenerative aortic valve stenosis.



PLAN:

1. Avoid positive fluid balance.

2. Continue diuresis.

3. Trend natriuretic peptide.

4. Warfarin dosing for full anticoagulation to INR of 2 to 3.









  ______________________________________________

  Misbah Francis M.D.





DR:  MAIA

D:  03/23/2019 22:20

T:  03/24/2019 03:01

JOB#:  [id number]

CC:



CL[place]TON

## 2019-03-24 NOTE — GENERAL PROGRESS NOTE
Assessment/Plan


Problem List:  


(1) Hypomagnesemia


ICD Codes:  E83.42 - Hypomagnesemia


SNOMED:  431895319


(2) HCAP (healthcare-associated pneumonia)


ICD Codes:  J18.9 - Pneumonia, unspecified organism


SNOMED:  013208216


(3) Dyspnea


ICD Codes:  R06.00 - Dyspnea, unspecified


SNOMED:  902702424


(4) Hypokalemia


ICD Codes:  E87.6 - Hypokalemia


SNOMED:  24491441


(5) Alzheimer's dementia


ICD Codes:  G30.9 - Alzheimer's disease, unspecified


SNOMED:  46482802


(6) HTN (hypertension)


ICD Codes:  I10 - Essential (primary) hypertension


SNOMED:  49889883


(7) CVA (cerebral vascular accident)


ICD Codes:  I63.9 - Cerebral infarction, unspecified


SNOMED:  833041769


(8) Respiratory distress


ICD Codes:  R06.00 - Dyspnea, unspecified


SNOMED:  819153733


(9) Peripheral edema


ICD Codes:  R60.9 - Edema, unspecified


SNOMED:  088839326, 745750815


(10) Congestive heart failure (CHF)


ICD Codes:  I50.9 - Heart failure, unspecified


SNOMED:  36676400


(11) Respiratory failure


ICD Codes:  J96.90 - Respiratory failure, unspecified, unspecified whether with 

hypoxia or hypercapnia


SNOMED:  012781618, 665833623


(12) Cellulitis of leg


ICD Codes:  L03.119 - Cellulitis of unspecified part of limb


SNOMED:  684207722


(13) Dementia


ICD Codes:  F03.90 - Unspecified dementia without behavioral disturbance


SNOMED:  08712789


(14) Dysphagia


ICD Codes:  R13.10 - Dysphagia, unspecified


SNOMED:  30090840, 116899815


(15) DVT (deep venous thrombosis)


ICD Codes:  I82.409 - Acute embolism and thrombosis of unspecified deep veins 

of unspecified lower extremity


SNOMED:  808665439


Assessment/Plan


extubated,  anxious to add seroquel, prior on ativan 40 yrs but benzo high risk 

in elderly, frail  cxr better, discontinue iv antibiotics,change levaquin for 

pseudomonas in sputum  change to po as pulled out line replace K, Mg, titration 

cardiac meds, full code per daughter swollow eval 


failed, daughter agrees to peg, has ivc filter would try off coumadin as high 

risk


start tube feed, dc planning





Subjective


ROS Limited/Unobtainable:  Yes


Allergies:  


Coded Allergies:  


     No Known Allergies (Unverified , 10/4/16)





Objective





Last 24 Hour Vital Signs








  Date Time  Temp Pulse Resp B/P (MAP) Pulse Ox O2 Delivery O2 Flow Rate FiO2


 


3/24/19 12:24 98.0 85  115/88 (97) 94   


 


3/24/19 08:00 96.4 95 24 143/63 (89) 94   


 


3/24/19 04:00 99.1 105 23 141/71 (94) 93   


 


3/24/19 00:00 99.3 105 24 140/68 (92) 95   


 


3/23/19 21:00      Room Air  





      Room Air  


 


3/23/19 20:00 99.0 95 19 137/70 (92) 94   


 


3/23/19 16:00  106      


 


3/23/19 16:00      Room Air  





      Room Air  


 


3/23/19 16:00 98.4 94 28 133/70 (91) 98   

















Intake and Output  


 


 3/23/19 3/24/19





 19:00 07:00


 


Intake Total 1120 ml 1110 ml


 


Output Total 1275 ml 300 ml


 


Balance -155 ml 810 ml


 


  


 


Intake Free Water 230 ml 450 ml


 


Tube Feeding 650 ml 660 ml


 


Other 240 ml 


 


Output Urine Total 1275 ml 300 ml








Laboratory Tests


3/24/19 06:41: 


White Blood Count 17.8H, Red Blood Count 4.51, Hemoglobin 12.2, Hematocrit 38.8

, Mean Corpuscular Volume 86, Mean Corpuscular Hemoglobin 27.1, Mean 

Corpuscular Hemoglobin Concent 31.5L, Red Cell Distribution Width 16.6H, 

Platelet Count 164, Mean Platelet Volume 7.7, Neutrophils (%) (Auto) 83.9H, 

Lymphocytes (%) (Auto) 7.0L, Monocytes (%) (Auto) 8.3, Eosinophils (%) (Auto) 

0.6, Basophils (%) (Auto) 0.3, Prothrombin Time 12.6H, Prothromb Time 

International Ratio 1.2H, Sodium Level 146H, Potassium Level 4.1, Chloride 

Level 106, Carbon Dioxide Level 32, Anion Gap 8, Blood Urea Nitrogen 32H, 

Creatinine 0.9, Estimat Glomerular Filtration Rate , Glucose Level 118H, 

Calcium Level 9.7, Pro-B-Type Natriuretic Peptide 603H


Height (Feet):  5


Height (Inches):  6.00


Weight (Pounds):  180


General Appearance:  no apparent distress, alert, confused


EENT:  normal ENT inspection


Neck:  normal alignment


Cardiovascular:  normal rate, regular rhythm


Respiratory/Chest:  lungs clear


Abdomen:  non tender, soft


Edema:  trace edema


Neurologic:  disoriented











Presley Lomax MD Mar 24, 2019 13:09

## 2019-03-25 NOTE — GENERAL PROGRESS NOTE
Assessment/Plan


Problem List:  


(1) DVT (deep venous thrombosis)


ICD Codes:  I82.409 - Acute embolism and thrombosis of unspecified deep veins 

of unspecified lower extremity


SNOMED:  957310488


(2) Dysphagia


ICD Codes:  R13.10 - Dysphagia, unspecified


SNOMED:  87458717, 451925800


(3) Dementia


ICD Codes:  F03.90 - Unspecified dementia without behavioral disturbance


SNOMED:  44125576


(4) HCAP (healthcare-associated pneumonia)


ICD Codes:  J18.9 - Pneumonia, unspecified organism


SNOMED:  647335602


(5) HTN (hypertension)


ICD Codes:  I10 - Essential (primary) hypertension


SNOMED:  58649722


Assessment/Plan


GTF tolerated


last BM yesterday


coumadin is ok to be restarted >> defer to primary team


fu labs


GT care





Subjective


ROS Limited/Unobtainable:  No


Allergies:  


Coded Allergies:  


     No Known Allergies (Unverified , 10/4/16)





Objective





Last 24 Hour Vital Signs








  Date Time  Temp Pulse Resp B/P (MAP) Pulse Ox O2 Delivery O2 Flow Rate FiO2


 


3/25/19 08:00 97.9 90 20 132/69 (90) 94   


 


3/25/19 04:00 98.2 78 18 120/56 (77) 100   


 


3/25/19 00:00 97.0 89 18 118/61 (80) 97   


 


3/24/19 21:00      Room Air  





      Room Air  


 


3/24/19 20:00 98.6 82 18 112/65 (81) 93   


 


3/24/19 17:06 98.4 104 21 115/63 (80) 95   


 


3/24/19 12:24 98.0 85 22 115/88 (97) 94   

















Intake and Output  


 


 3/24/19 3/25/19





 19:00 07:00


 


Intake Total 600 ml 960 ml


 


Output Total 650 ml 


 


Balance -50 ml 960 ml


 


  


 


Intake Free Water 300 ml 300 ml


 


Tube Feeding 300 ml 660 ml


 


Output Urine Total 650 ml 








Height (Feet):  5


Height (Inches):  6.00


Weight (Pounds):  180


General Appearance:  no apparent distress


EENT:  normal ENT inspection


Neck:  supple


Cardiovascular:  normal rate


Respiratory/Chest:  decreased breath sounds


Abdomen:  normal bowel sounds, non tender, soft


Extremities:  non-tender











Sancho Buckley MD Mar 25, 2019 10:02

## 2019-03-25 NOTE — PULMONOLOGY PROGRESS NOTE
Assessment/Plan


Assessment/Plan


1. Congestive heart failure.


2. Acute respiratory failure, resolved


3. Hypotension, resolved


4. Pneumonia.


5. Tardive dyskinesia





PEG feedings tolerated


continue levaquin


cont nebs


dc planning





Subjective


ROS Limited/Unobtainable:  Yes


Allergies:  


Coded Allergies:  


     No Known Allergies (Unverified , 10/4/16)





Objective





Last 24 Hour Vital Signs








  Date Time  Temp Pulse Resp B/P (MAP) Pulse Ox O2 Delivery O2 Flow Rate FiO2


 


3/25/19 12:00 98.1 86 20 129/66 (87) 94   


 


3/25/19 08:00 97.9 90 20 132/69 (90) 94   


 


3/25/19 04:00 98.2 78 18 120/56 (77) 100   


 


3/25/19 00:00 97.0 89 18 118/61 (80) 97   


 


3/24/19 21:00      Room Air  





      Room Air  


 


3/24/19 20:00 98.6 82 18 112/65 (81) 93   


 


3/24/19 17:06 98.4 104 21 115/63 (80) 95   

















Intake and Output  


 


 3/24/19 3/25/19





 19:00 07:00


 


Intake Total 600 ml 960 ml


 


Output Total 650 ml 


 


Balance -50 ml 960 ml


 


  


 


Intake Free Water 300 ml 300 ml


 


Tube Feeding 300 ml 660 ml


 


Output Urine Total 650 ml 








Objective


tardive dyskinesia


General Appearance:  WD/WN, no acute distress


HEENT:  atraumatic


Respiratory/Chest:  lungs clear


Cardiovascular:  normal rate


Laboratory Tests


3/25/19 10:05: 


Prothrombin Time 11.6H, Prothromb Time International Ratio 1.1





Current Medications








 Medications


  (Trade)  Dose


 Ordered  Sig/Toribio


 Route


 PRN Reason  Start Time


 Stop Time Status Last Admin


Dose Admin


 


 Chlorhexidine


 Gluconate


  (Hanna-Hex 2%)  1 applic  DAILY@2000


 TOPIC


   3/23/19 20:00


 4/18/19 19:59   


 


 


 Dextrose


  (Dextrose 50%)  25 ml  Q30M  PRN


 IV


 Hypoglycemia  3/23/19 19:15


 4/11/19 22:44   


 


 


 Dextrose


  (Dextrose 50%)  50 ml  Q30M  PRN


 IV


 Hypoglycemia  3/23/19 19:15


 4/11/19 22:44   


 


 


 Famotidine


  (Pepcid)  20 mg  BID


 ORAL


   3/24/19 09:00


 4/16/19 17:59  3/25/19 09:02


 


 


 Furosemide


  (Lasix)  20 mg  DAILY


 ORAL


   3/25/19 09:00


 4/24/19 08:59  3/25/19 09:03


 


 


 Levofloxacin


  (Levaquin)  750 mg  Q48H


 ORAL


   3/25/19 14:00


 4/1/19 13:59   


 


 


 Magnesium


 Hydroxide


  (Mom)  30 ml  DAILYPRN  PRN


 ORAL


 Constipation  3/24/19 18:45


 4/19/19 18:44   


 


 


 Magnesium Oxide


  (Mag-Ox 400mg)  400 mg  THREE TIMES A  DAY


 ORAL


   3/24/19 09:00


 4/15/19 17:59  3/25/19 09:02


 


 


 Ondansetron HCl


  (Zofran)  4 mg  Q6H  PRN


 IVP


 Nausea & Vomiting  3/23/19 17:30


 4/11/19 17:29   


 


 


 Quetiapine


 Fumarate


  (SEROquel)  25 mg  BEDTIME


 ORAL


   3/25/19 21:00


 4/24/19 20:59   


 


 


 Spironolactone


  (Aldactone)  25 mg  DAILY


 ORAL


   3/24/19 09:00


 4/17/19 18:29  3/25/19 09:02


 


 


 Warfarin Sodium


  (Coumadin per


 pharmacy)  1 ea  DAILY  PRN


 MISC


 Per rx protocol  3/25/19 02:15


 4/24/19 02:14   


 


 


 Warfarin Sodium


  (Coumadin)  7.5 mg  COUMADIN  ONCE


 ORAL


   3/25/19 17:00


 3/25/19 17:01   


 

















Nigel Aguayo MD Mar 25, 2019 12:37

## 2019-03-25 NOTE — GENERAL PROGRESS NOTE
Assessment/Plan


Problem List:  


(1) Hypomagnesemia


ICD Codes:  E83.42 - Hypomagnesemia


SNOMED:  652531503


(2) HCAP (healthcare-associated pneumonia)


ICD Codes:  J18.9 - Pneumonia, unspecified organism


SNOMED:  878201037


(3) Dyspnea


ICD Codes:  R06.00 - Dyspnea, unspecified


SNOMED:  482999454


(4) Hypokalemia


ICD Codes:  E87.6 - Hypokalemia


SNOMED:  90542869


(5) Alzheimer's dementia


ICD Codes:  G30.9 - Alzheimer's disease, unspecified


SNOMED:  43598603


(6) HTN (hypertension)


ICD Codes:  I10 - Essential (primary) hypertension


SNOMED:  27406019


(7) CVA (cerebral vascular accident)


ICD Codes:  I63.9 - Cerebral infarction, unspecified


SNOMED:  351822024


(8) Respiratory distress


ICD Codes:  R06.00 - Dyspnea, unspecified


SNOMED:  864256503


(9) Peripheral edema


ICD Codes:  R60.9 - Edema, unspecified


SNOMED:  108584219, 881653766


(10) Congestive heart failure (CHF)


ICD Codes:  I50.9 - Heart failure, unspecified


SNOMED:  11631328


(11) Respiratory failure


ICD Codes:  J96.90 - Respiratory failure, unspecified, unspecified whether with 

hypoxia or hypercapnia


SNOMED:  811402624, 379670894


(12) Cellulitis of leg


ICD Codes:  L03.119 - Cellulitis of unspecified part of limb


SNOMED:  638787712


(13) Dementia


ICD Codes:  F03.90 - Unspecified dementia without behavioral disturbance


SNOMED:  96734980


(14) Dysphagia


ICD Codes:  R13.10 - Dysphagia, unspecified


SNOMED:  60766423, 832518318


(15) DVT (deep venous thrombosis)


ICD Codes:  I82.409 - Acute embolism and thrombosis of unspecified deep veins 

of unspecified lower extremity


SNOMED:  161280403


Assessment/Plan


extubated,  anxious to add seroquel, prior on ativan 40 yrs but benzo high risk 

in elderly, frail  cxr better, discontinue iv antibiotics,change levaquin for 

pseudomonas in sputum  change to po as pulled out line replace K, Mg, titration 

cardiac meds, full code per daughter swollow eval 


failed, daughter agrees to peg, has ivc filter would try off coumadin as high 

risk


start tube feed, dc planning





Subjective


ROS Limited/Unobtainable:  Yes


Allergies:  


Coded Allergies:  


     No Known Allergies (Unverified , 10/4/16)





Objective





Last 24 Hour Vital Signs








  Date Time  Temp Pulse Resp B/P (MAP) Pulse Ox O2 Delivery O2 Flow Rate FiO2


 


3/25/19 16:00 98.3 102 20 105/69 (81) 94   


 


3/25/19 12:00 98.1 86 20 129/66 (87) 94   


 


3/25/19 08:00 97.9 90 20 132/69 (90) 94   


 


3/25/19 04:00 98.2 78 18 120/56 (77) 100   


 


3/25/19 00:00 97.0 89 18 118/61 (80) 97   


 


3/24/19 21:00      Room Air  





      Room Air  


 


3/24/19 20:00 98.6 82 18 112/65 (81) 93   


 


3/24/19 17:06 98.4 104 21 115/63 (80) 95   

















Intake and Output  


 


 3/24/19 3/25/19





 19:00 07:00


 


Intake Total 600 ml 1020 ml


 


Output Total 650 ml 


 


Balance -50 ml 1020 ml


 


  


 


Intake Free Water 300 ml 300 ml


 


Tube Feeding 300 ml 720 ml


 


Output Urine Total 650 ml 








Laboratory Tests


3/25/19 10:05: 


Prothrombin Time 11.6H, Prothromb Time International Ratio 1.1


Height (Feet):  5


Height (Inches):  6.00


Weight (Pounds):  180


General Appearance:  no apparent distress, alert, confused


EENT:  normal ENT inspection


Neck:  normal alignment


Cardiovascular:  normal rate, regular rhythm


Respiratory/Chest:  rhonchi - bilaterally


Abdomen:  non tender


Edema:  mild edema


Neurologic:  disoriented











Presley Lomax MD Mar 25, 2019 16:49

## 2019-03-25 NOTE — PROGRESS NOTE
DATE:  03/25/2019

CARDIOLOGY PROGRESS NOTE



SUBJECTIVE:  The patient _____ she is receiving nutrition by feeding tube.

She is on oral antibiotics.  She continues nebulizer therapy.



OBJECTIVE:

VITAL SIGNS:  Blood pressure is 115/63, heart rate 85 to 105, respiratory

rate 21, and afebrile.  Monitored rhythm is sinus.

LUNGS:  Few rhonchi.

CARDIAC:  Regular rate and rhythm.  Normal S1 and S2.

ABDOMEN:  Soft.

EXTREMITIES:  Trace edema.



LABORATORY DATA:  White count 17 and hemoglobin 12.  Sodium is 146,

potassium 4.1, bicarbonate 32, BUN 32, and creatinine 0.9.

Pro-natriuretic peptide has decreased _____.  INR is 1.2.



IMPRESSION:

1. Status post respiratory failure.

2. Acute deep venous thrombosis.

3. Acute on chronic diastolic congestive heart failure.

4. Cerebrovascular accident with left-sided weakness.

5. Aspiration pneumonia.

6. Dysphagia, status post gastrostomy tube.

7. Dehydration.

8. Hypernatremia.



PLAN:

1. Antimicrobials.

2. Respiratory hygiene.

3. Maintenance diuresis.

4. Warfarin titration to INR goal of 2 to 3.

5. Free water replacement.









  ______________________________________________

  Misbah Francis M.D.





DR:  RENATE

D:  03/25/2019 02:10

T:  03/25/2019 05:37

JOB#:  [id number]

CC:

## 2019-03-26 NOTE — GENERAL PROGRESS NOTE
Assessment/Plan


Problem List:  


(1) Hypomagnesemia


ICD Codes:  E83.42 - Hypomagnesemia


SNOMED:  478120139


(2) HCAP (healthcare-associated pneumonia)


ICD Codes:  J18.9 - Pneumonia, unspecified organism


SNOMED:  057413227


(3) Dyspnea


ICD Codes:  R06.00 - Dyspnea, unspecified


SNOMED:  632641089


(4) Hypokalemia


ICD Codes:  E87.6 - Hypokalemia


SNOMED:  32710552


(5) Alzheimer's dementia


ICD Codes:  G30.9 - Alzheimer's disease, unspecified


SNOMED:  13979644


(6) HTN (hypertension)


ICD Codes:  I10 - Essential (primary) hypertension


SNOMED:  82953633


(7) CVA (cerebral vascular accident)


ICD Codes:  I63.9 - Cerebral infarction, unspecified


SNOMED:  359586807


(8) Respiratory distress


ICD Codes:  R06.00 - Dyspnea, unspecified


SNOMED:  839077565


(9) Peripheral edema


ICD Codes:  R60.9 - Edema, unspecified


SNOMED:  246718661, 248576476


(10) Congestive heart failure (CHF)


ICD Codes:  I50.9 - Heart failure, unspecified


SNOMED:  56258766


(11) Respiratory failure


ICD Codes:  J96.90 - Respiratory failure, unspecified, unspecified whether with 

hypoxia or hypercapnia


SNOMED:  619115518, 627069114


(12) Cellulitis of leg


ICD Codes:  L03.119 - Cellulitis of unspecified part of limb


SNOMED:  104745820


(13) Dementia


ICD Codes:  F03.90 - Unspecified dementia without behavioral disturbance


SNOMED:  14807137


(14) Dysphagia


ICD Codes:  R13.10 - Dysphagia, unspecified


SNOMED:  16715937, 760321642


(15) DVT (deep venous thrombosis)


ICD Codes:  I82.409 - Acute embolism and thrombosis of unspecified deep veins 

of unspecified lower extremity


SNOMED:  317914228


Assessment/Plan


extubated,  anxious to add seroquel, prior on ativan 40 yrs but benzo high risk 

in elderly, frail  cxr better, discontinue iv antibiotics,change levaquin for 

pseudomonas in sputum  change to po as pulled out line replace K, Mg, titration 

cardiac meds, full code per daughter swollow eval 


failed, daughter agrees to peg, has ivc filter would try off coumadin as high 

risk


start tube feed, dc planning





Subjective


ROS Limited/Unobtainable:  Yes


Allergies:  


Coded Allergies:  


     No Known Allergies (Unverified , 10/4/16)





Objective





Last 24 Hour Vital Signs








  Date Time  Temp Pulse Resp B/P (MAP) Pulse Ox O2 Delivery O2 Flow Rate FiO2


 


3/26/19 08:00 97.7 106 25 136/80 (98) 93   


 


3/26/19 04:13 97.9 103 17 135/75 (95) 94   


 


3/26/19 02:22     92 Nasal Cannula 2.0 28


 


3/26/19 02:22      Nasal Cannula 2.0 28


 


3/26/19 00:10 98.2 106 18 141/53 (82) 94   


 


3/25/19 20:29      Room Air  





      Room Air  


 


3/25/19 20:16 97.4 107 18 145/71 (95) 92   


 


3/25/19 16:00 98.3 102 20 105/69 (81) 94   


 


3/25/19 12:00 98.1 86 20 129/66 (87) 94   

















Intake and Output  


 


 3/25/19 3/26/19





 19:00 07:00


 


Intake Total 940 ml 930 ml


 


Output Total 200 ml 


 


Balance 740 ml 930 ml


 


  


 


Intake Free Water 220 ml 210 ml


 


Tube Feeding 720 ml 720 ml


 


Output Urine Total 200 ml 


 


# Voids 2 








Laboratory Tests


3/26/19 07:50: 


White Blood Count 14.6H, Red Blood Count 4.56, Hemoglobin 12.2, Hematocrit 39.2

, Mean Corpuscular Volume 86, Mean Corpuscular Hemoglobin 26.8L, Mean 

Corpuscular Hemoglobin Concent 31.2L, Red Cell Distribution Width 16.8H, 

Platelet Count 178, Mean Platelet Volume 7.7, Neutrophils (%) (Auto) 79.4H, 

Lymphocytes (%) (Auto) 9.4L, Monocytes (%) (Auto) 9.0, Eosinophils (%) (Auto) 

1.4, Basophils (%) (Auto) 0.8, Sodium Level 143, Potassium Level 4.3, Chloride 

Level 107, Carbon Dioxide Level 28, Anion Gap 8, Blood Urea Nitrogen 32H, 

Creatinine 0.8, Estimat Glomerular Filtration Rate , Glucose Level 126H, 

Calcium Level 9.6, Magnesium Level 2.6H, Total Bilirubin 0.7, Aspartate Amino 

Transf (AST/SGOT) 27, Alanine Aminotransferase (ALT/SGPT) 23, Alkaline 

Phosphatase 62, Pro-B-Type Natriuretic Peptide 641H, Total Protein 8.1, Albumin 

2.9L, Globulin 5.2, Albumin/Globulin Ratio 0.6L


Height (Feet):  5


Height (Inches):  6.00


Weight (Pounds):  178


General Appearance:  alert, confused


EENT:  normal ENT inspection


Neck:  normal alignment


Cardiovascular:  normal rate


Respiratory/Chest:  lungs clear


Abdomen:  non tender, soft


Edema:  trace edema


Neurologic:  CNs II-XII grossly normal, disoriented











Presley Lomax MD Mar 26, 2019 12:00

## 2019-03-26 NOTE — PULMONOLOGY PROGRESS NOTE
Assessment/Plan


Assessment/Plan


1. Congestive heart failure.


2. Acute respiratory failure, resolved


3. Hypotension, resolved


4. Pneumonia.


5. Tardive dyskinesia





PEG feedings tolerated


continue levaquin GT


cont nebs


dc planning


will sign off





Subjective


ROS Limited/Unobtainable:  Yes


Allergies:  


Coded Allergies:  


     No Known Allergies (Unverified , 10/4/16)





Objective





Last 24 Hour Vital Signs








  Date Time  Temp Pulse Resp B/P (MAP) Pulse Ox O2 Delivery O2 Flow Rate FiO2


 


3/26/19 12:00 98.8 86 24 109/58 (75) 97   


 


3/26/19 08:00 97.7 106 25 136/80 (98) 93   


 


3/26/19 04:13 97.9 103 17 135/75 (95) 94   


 


3/26/19 02:22     92 Nasal Cannula 2.0 28


 


3/26/19 02:22      Nasal Cannula 2.0 28


 


3/26/19 00:10 98.2 106 18 141/53 (82) 94   


 


3/25/19 20:29      Room Air  





      Room Air  


 


3/25/19 20:16 97.4 107 18 145/71 (95) 92   


 


3/25/19 16:00 98.3 102 20 105/69 (81) 94   

















Intake and Output  


 


 3/25/19 3/26/19





 19:00 07:00


 


Intake Total 940 ml 930 ml


 


Output Total 200 ml 


 


Balance 740 ml 930 ml


 


  


 


Intake Free Water 220 ml 210 ml


 


Tube Feeding 720 ml 720 ml


 


Output Urine Total 200 ml 


 


# Voids 2 








Objective


tardive dyskinesia


General Appearance:  no acute distress


Respiratory/Chest:  lungs clear


Cardiovascular:  normal rate


Laboratory Tests


3/26/19 07:50: 


White Blood Count 14.6H, Red Blood Count 4.56, Hemoglobin 12.2, Hematocrit 39.2

, Mean Corpuscular Volume 86, Mean Corpuscular Hemoglobin 26.8L, Mean 

Corpuscular Hemoglobin Concent 31.2L, Red Cell Distribution Width 16.8H, 

Platelet Count 178, Mean Platelet Volume 7.7, Neutrophils (%) (Auto) 79.4H, 

Lymphocytes (%) (Auto) 9.4L, Monocytes (%) (Auto) 9.0, Eosinophils (%) (Auto) 

1.4, Basophils (%) (Auto) 0.8, Sodium Level 143, Potassium Level 4.3, Chloride 

Level 107, Carbon Dioxide Level 28, Anion Gap 8, Blood Urea Nitrogen 32H, 

Creatinine 0.8, Estimat Glomerular Filtration Rate , Glucose Level 126H, 

Calcium Level 9.6, Magnesium Level 2.6H, Total Bilirubin 0.7, Aspartate Amino 

Transf (AST/SGOT) 27, Alanine Aminotransferase (ALT/SGPT) 23, Alkaline 

Phosphatase 62, Pro-B-Type Natriuretic Peptide 641H, Total Protein 8.1, Albumin 

2.9L, Globulin 5.2, Albumin/Globulin Ratio 0.6L





Current Medications








 Medications


  (Trade)  Dose


 Ordered  Sig/Toribio


 Route


 PRN Reason  Start Time


 Stop Time Status Last Admin


Dose Admin


 


 Chlorhexidine


 Gluconate


  (Hanna-Hex 2%)  1 applic  DAILY@2000


 TOPIC


   3/23/19 20:00


 4/18/19 19:59   


 


 


 Dextrose


  (Dextrose 50%)  25 ml  Q30M  PRN


 IV


 Hypoglycemia  3/23/19 19:15


 4/11/19 22:44   


 


 


 Dextrose


  (Dextrose 50%)  50 ml  Q30M  PRN


 IV


 Hypoglycemia  3/23/19 19:15


 4/11/19 22:44   


 


 


 Famotidine


  (Pepcid)  20 mg  BID


 ORAL


   3/24/19 09:00


 4/16/19 17:59  3/26/19 09:13


 


 


 Furosemide


  (Lasix)  20 mg  DAILY


 ORAL


   3/25/19 09:00


 4/24/19 08:59  3/26/19 09:13


 


 


 Levofloxacin


  (Levaquin)  750 mg  Q48H


 ORAL


   3/25/19 14:00


 4/1/19 13:59  3/25/19 13:49


 


 


 Magnesium


 Hydroxide


  (Mom)  30 ml  DAILYPRN  PRN


 ORAL


 Constipation  3/24/19 18:45


 4/19/19 18:44   


 


 


 Magnesium Oxide


  (Mag-Ox 400mg)  400 mg  THREE TIMES A  DAY


 ORAL


   3/24/19 09:00


 4/15/19 17:59  3/26/19 09:13


 


 


 Ondansetron HCl


  (Zofran)  4 mg  Q6H  PRN


 IVP


 Nausea & Vomiting  3/23/19 17:30


 4/11/19 17:29   


 


 


 Quetiapine


 Fumarate


  (SEROquel)  25 mg  BEDTIME


 ORAL


   3/25/19 21:00


 4/24/19 20:59  3/25/19 20:45


 


 


 Spironolactone


  (Aldactone)  25 mg  DAILY


 ORAL


   3/24/19 09:00


 4/17/19 18:29  3/26/19 09:13


 

















Nigel Aguayo MD Mar 26, 2019 13:08

## 2019-03-26 NOTE — GENERAL PROGRESS NOTE
Assessment/Plan


Problem List:  


(1) DVT (deep venous thrombosis)


ICD Codes:  I82.409 - Acute embolism and thrombosis of unspecified deep veins 

of unspecified lower extremity


SNOMED:  011920107


(2) Dysphagia


ICD Codes:  R13.10 - Dysphagia, unspecified


SNOMED:  56698665, 699721971


(3) Dementia


ICD Codes:  F03.90 - Unspecified dementia without behavioral disturbance


SNOMED:  85807468


(4) HCAP (healthcare-associated pneumonia)


ICD Codes:  J18.9 - Pneumonia, unspecified organism


SNOMED:  806526013


(5) HTN (hypertension)


ICD Codes:  I10 - Essential (primary) hypertension


SNOMED:  39688793


Assessment/Plan


GTF tolerated


last BM yesterday


coumadin is ok to be restarted >> defer to primary team


fu labs


GT care





Subjective


ROS Limited/Unobtainable:  No


Allergies:  


Coded Allergies:  


     No Known Allergies (Unverified , 10/4/16)





Objective





Last 24 Hour Vital Signs








  Date Time  Temp Pulse Resp B/P (MAP) Pulse Ox O2 Delivery O2 Flow Rate FiO2


 


3/26/19 04:13 97.9 103 17 135/75 (95) 94   


 


3/26/19 02:22     92 Nasal Cannula 2.0 28


 


3/26/19 02:22      Nasal Cannula 2.0 28


 


3/26/19 00:10 98.2 106 18 141/53 (82) 94   


 


3/25/19 20:29      Room Air  





      Room Air  


 


3/25/19 20:16 97.4 107 18 145/71 (95) 92   


 


3/25/19 16:00 98.3 102 20 105/69 (81) 94   


 


3/25/19 12:00 98.1 86 20 129/66 (87) 94   

















Intake and Output  


 


 3/25/19 3/26/19





 19:00 07:00


 


Intake Total 940 ml 930 ml


 


Output Total 200 ml 


 


Balance 740 ml 930 ml


 


  


 


Intake Free Water 220 ml 210 ml


 


Tube Feeding 720 ml 720 ml


 


Output Urine Total 200 ml 


 


# Voids 2 








Laboratory Tests


3/25/19 10:05: 


Prothrombin Time 11.6H, Prothromb Time International Ratio 1.1


3/26/19 07:50: 


White Blood Count 14.6H, Red Blood Count 4.56, Hemoglobin 12.2, Hematocrit 39.2

, Mean Corpuscular Volume 86, Mean Corpuscular Hemoglobin 26.8L, Mean 

Corpuscular Hemoglobin Concent 31.2L, Red Cell Distribution Width 16.8H, 

Platelet Count 178, Mean Platelet Volume 7.7, Neutrophils (%) (Auto) 79.4H, 

Lymphocytes (%) (Auto) 9.4L, Monocytes (%) (Auto) 9.0, Eosinophils (%) (Auto) 

1.4, Basophils (%) (Auto) 0.8, Sodium Level 143, Potassium Level 4.3, Chloride 

Level 107, Carbon Dioxide Level 28, Anion Gap 8, Blood Urea Nitrogen 32H, 

Creatinine 0.8, Estimat Glomerular Filtration Rate , Glucose Level 126H, 

Calcium Level 9.6, Magnesium Level 2.6H, Total Bilirubin 0.7, Aspartate Amino 

Transf (AST/SGOT) 27, Alanine Aminotransferase (ALT/SGPT) 23, Alkaline 

Phosphatase 62, Pro-B-Type Natriuretic Peptide 641H, Total Protein 8.1, Albumin 

2.9L, Globulin 5.2, Albumin/Globulin Ratio 0.6L


Height (Feet):  5


Height (Inches):  6.00


Weight (Pounds):  178


General Appearance:  no apparent distress


EENT:  normal ENT inspection


Neck:  supple


Cardiovascular:  normal rate


Respiratory/Chest:  decreased breath sounds


Abdomen:  normal bowel sounds, non tender, soft


Extremities:  non-tender











Sancho Buckley MD Mar 26, 2019 09:03

## 2019-03-26 NOTE — PROGRESS NOTE
DATE:  03/25/2019

SUBJECTIVE:  The patient is without respiratory distress.  She is

tolerating feedings by G-tube.  The patient has an IVC filter noted in

place.   She has a DVT as well.  Bleeding risk were addressed with Dr. Lomax and it was felt that anticoagulation should be discontinued based on

the risks of bleeding and presence of IVC filter.



PHYSICAL EXAMINATION:

VITAL SIGNS:  Afebrile, blood pressure  129/66, pulse 86, respiratory rate

20.

LUNGS:  Clear.

CARDIAC:  Regular.

ABDOMEN:  Soft.  G-tube intact.

EXTREMITIES:  No edema.



IMPRESSION:

1. Status post respiratory failure.

2. DVT with IVC filter.

3. Cerebrovascular disease with left-sided weakness.

4. Acute on chronic diastolic congestive heart failure, now

compensated.

5. Dehydration and ______ , post diuretic therapy.



PLAN:

1. No anticoagulation.

2. Respiratory hygiene.

3. Free water replacement based on clinical parameters.

4. Maintenance diuretic dose with titration based on clinical

parameters.

5. Recheck electrolytes.









  ______________________________________________

  Misbah Francis M.D.





DR:  Wisam

D:  03/25/2019 23:01

T:  03/25/2019 23:41

JOB#:  [id number]

CC:

## 2019-03-27 NOTE — GENERAL PROGRESS NOTE
Assessment/Plan


Problem List:  


(1) DVT (deep venous thrombosis)


ICD Codes:  I82.409 - Acute embolism and thrombosis of unspecified deep veins 

of unspecified lower extremity


SNOMED:  795617491


(2) Dysphagia


ICD Codes:  R13.10 - Dysphagia, unspecified


SNOMED:  24186078, [id number]


(3) Dementia


ICD Codes:  F03.90 - Unspecified dementia without behavioral disturbance


SNOMED:  16316831


(4) HCAP (healthcare-associated pneumonia)


ICD Codes:  J18.9 - Pneumonia, unspecified organism


SNOMED:  009485752


(5) HTN (hypertension)


ICD Codes:  I10 - Essential (primary) hypertension


SNOMED:  02160346


Assessment/Plan


GTF tolerated


fu labs


GT care


pending dc





Subjective


ROS Limited/Unobtainable:  No


Allergies:  


Coded Allergies:  


     No Known Allergies (Unverified , 10/4/16)





Objective





Last 24 Hour Vital Signs








  Date Time  Temp Pulse Resp B/P (MAP) Pulse Ox O2 Delivery O2 Flow Rate FiO2


 


3/27/19 08:00 97.7 92 24 117/70 (86) 95   


 


3/27/19 04:00 98.6 78 18 120/62 (81) 100   


 


3/27/19 00:00 98.4 75 20 121/57 (78) 100   


 


3/26/19 21:00      Room Air  





      Room Air  


 


3/26/19 20:00 97.3 89 18 146/78 (100) 97   


 


3/26/19 16:00 98.5 86 18 121/55 (77) 98   


 


3/26/19 12:00 98.8 86 24 109/58 (75) 97   


 


3/26/19 09:38      Nasal Cannula 2.0 28


 


3/26/19 09:38     94 Nasal Cannula 2.0 28

















Intake and Output  


 


 3/26/19 3/27/19





 18:59 06:59


 


Intake Total 1000 ml 1140 ml


 


Balance 1000 ml 1140 ml


 


  


 


Intake Free Water 280 ml 360 ml


 


Tube Feeding 720 ml 780 ml








Height (Feet):  5


Height (Inches):  6.00


Weight (Pounds):  191


General Appearance:  alert


EENT:  normal ENT inspection


Neck:  supple


Cardiovascular:  normal rate


Respiratory/Chest:  decreased breath sounds


Abdomen:  normal bowel sounds, non tender, soft


Extremities:  non-tender











Sancho Buckley MD Mar 27, 2019 09:03

## 2019-03-27 NOTE — PROGRESS NOTE
DATE:  03/26/2019

CARDIOLOGY PROGRESS NOTE



SUBJECTIVE:  The patient is tolerating feedings by G-tube.  No respiratory

distress.  Discharge planning is in progress.  The patient is off

anticoagulation even though she has a DVT, as she has an IVC filter and

bleeding risks were felt to be high.



OBJECTIVE:

VITAL SIGNS:  Blood pressure 109/58, pulse 86, and respirations 24.  Heart

rate ranging from 86 to 107.

LUNGS:  Good breath sounds.

CARDIAC:  Regular rhythm and rate.  Normal S1, S2.

ABDOMEN:  Soft.  G-tube site intact.

EXTREMITIES:  No edema.

NEUROLOGIC:  Left-sided weakness.



IMPRESSION AND PLAN:  Stable from cardiovascular standpoint on current

medication regimen that includes maintenance diuretic dose.  Presently, no

plans for anticoagulation.









  ______________________________________________

  Misbah Francis M.D.





DR:  WILLIE

D:  03/27/2019 02:49

T:  03/27/2019 03:15

JOB#:  [id number]

CC:

## 2019-03-28 NOTE — PROGRESS NOTE
DATE:  03/27/2019

CARDIOLOGY PROGRESS NOTE



SUBJECTIVE:  The patient is improved.  No shortness of breath.  Tolerating

nutrition by feeding tube.



OBJECTIVE:

VITAL SIGNS:  Blood pressure 126/62, pulse 78, and respirations 18.

LUNGS:  Clear.

CARDIAC:  Regular.  Normal S1, S2.

ABDOMEN:  Soft.  G-tube intact.

EXTREMITIES:  Left weakness.  Trace edema.



IMPRESSION:

1. Status post respiratory failure.

2. DVT with IVC filter.

3. Acute on chronic diastolic congestive heart failure, now

compensated.

4. Dysphagia, status post G-tube.



PLAN:

1. No anticoagulation due to bleeding risk.  The patient has IVC

filter.

2. Nutrition by feeding tube.

3. Maintain adequate hydration.

4. Monitor volume status, cardiorenal parameters, and electrolytes.

5. Cardiovascular regimen reviewed and reconciled for discharge to

skilled nursing facility.









  ______________________________________________

  Misbah Francis M.D. DR:  ELVIA/NINO

D:  03/28/2019 01:23

T:  03/28/2019 14:27

JOB#:  [id number]

CC:

## 2019-03-28 NOTE — DISCHARGE SUMMARY
DATE OF ADMISSION:  03/12/2019



DATE OF DISCHARGE:  03/27/2019



HISTORY:  The patient is an 88-year-old lady, who presented with

respiratory failure.  She has Alzheimer's disease.  Chest x-ray showed

some pneumonitis and congestive heart failure.  She was initially on BiPAP

and subsequently intubated.



PERTINENT PHYSICAL FINDINGS:  See the note by  _____ on

admission.

LUNGS:  Showed fine crackles.

HEART:  Regular rhythm.

ABDOMEN:  Soft.

EXTREMITIES:  Show 2+ edema.  There is some cellulitis of the legs.



COURSE IN THE HOSPITAL:  The patient had respiratory failure, history of

pneumonia, and congestive heart failure.  She had a slow improvement and

gradually was able to be extubated.  The patient had urinary tract

infection with E. coli.  Blood culture likely contaminant with

coag-negative staph and sputum grew Pseudomonas.  Antibiotics were

adjusted according to cultures.  She had a gradual improvement in all

parameters.  She was extubated.  She failed a swallow study and Dr. Buckley placed a gastrostomy after discussion with the family.  The

patient tolerates feedings and arrangements were made for her to go to a

skilled nursing facility and she was discharged in stable condition.



FINAL DIAGNOSES:

1. Acute respiratory failure with hypoxemia.

2. Pneumonia.

3. Congestive heart failure, acute on chronic.

4. Hypotension, resolved.

5. Alzheimer.

6. Tardive dyskinesia.

7. History of anxiety.

8. Urinary tract infection with E. coli.

9. Pseudomonas in the sputum.

10. Coag-negative Staphylococcus coccus, likely contaminant.

11. Bedridden state, nonambulatory.

12. Prior history of DVT.



DISCHARGE DISPOSITION:  To an ECF with tube feeding and medications per the

discharge medication.  Follow up by Dr. Lomax in the facility.









  ______________________________________________

  Presley Lomax M.D. DR:  NINI

D:  03/27/2019 15:27

T:  03/28/2019 01:27

JOB#:  [id number]

CC:

## 2019-03-28 NOTE — DIAGNOSTIC IMAGING REPORT
Indication: Cough

 

Comparison:  3/21/2019

 

A single view chest radiograph was obtained.

 

Findings:

 

No definite infiltrate or pulmonary vascular congestion identified.  The heart is

enlarged. The aorta is mildly enlarged consistent with atherosclerotic vascular

disease.  The bones are osteopenic.

 

Impression:

 

No acute disease

## 2024-08-27 NOTE — CONSULTATION
DATE OF CONSULTATION:  12/29/2016



NOTE:  POOR AUDIO



HEMATOLOGY/ONCOLOGY CONSULTATION



CONSULTING PHYSICIAN:  Daniel Sierra M.D.



REQUESTING PHYSICIAN:  Jermaine Milligan M.D.



REASON FOR CONSULTATION:  Evaluation of thrombocytopenia and

bilateral DVT.



IDENTIFICATION:  Dear Dr. Jermaine Milligan,



The patient is a pleasant 86-year-old female, who was seen before

approximately two months ago in October.  She has past medical history,

which is significant for history of cataract surgery, CVA, dysphagia,

_____, dementia, history of _____, bilateral DVTs of the lower extremities

of the popliteal veins with status post IVC filter placement, most

recently had been on Coumadin.  At this time, she presents to [facility] with E. coli UTI, _____ upon imaging of x-ray.  Infectious

Disease service was consulted.  The patient was started on antibiotics

_____ and Hematology service was consulted _____ DVT.



PAST MEDICAL HISTORY:  _____, CVA, brain aneurysm, _____ depression,

dementia, _____.



PAST SURGICAL HISTORY:  _____.



MEDICATIONS:  At home, Colace, _____.



ALLERGIES:  No known drug allergies.



PAST SOCIAL HISTORY:  No alcohol, tobacco, or illicit drug use.



FAMILY HISTORY:  Noncontributory.



REVIEW OF SYSTEMS:  Difficult to obtain, most of the history is

obtained with the patient's _____.  The patient denied headache,

____.Pulmonary:  No coughing or shortness of breath.  Cardiovascular:  No

chest pain, tightness, or palpitations.  Gastrointestinal:  No nausea,

vomiting, or diarrhea.  Genitourinary:  No dysuria, frequency, or urgency.

Musculoskeletal:  No joint swelling, muscle pain, or trauma.  Neurologic:

_____.



PHYSICAL EXAMINATION:

GENERAL:  The patient is in no acute distress.

VITAL SIGNS:  Temperature 100.2 degrees Fahrenheit, pulse of 84,

respiratory rate _____, blood pressure 172/74, and pulse oximetry is 99%

on 2 liters nasal cannula.

PULMONARY:  Decreased breath sounds bilaterally.

CARDIOVASCULAR:  Regular rate and rhythm.

ABDOMEN:  Soft, nontender, and nondistended.

EXTREMITIES:  A 1+ edema.



LABORATORY DATA:  WBC _____, hemoglobin 13.6, hematocrit 44, and

platelet count of 129,000.



Imaging, chest x-ray shows bilateral ____ interstitial disease.



Doppler imaging done before approximately one month ago shows bilateral

DVTs.



Also the patient with elevated D-dimer _____ concerning for history of

ongoing _____ given the patient's history of DVT in the past, which was

documented.  The patient is status post IVC filter placement and currently

off the anticoagulation.



DVT _____, status post IVC filter placement bilaterally of the femoral

veins.



_____ has been improved.



_____.



Thrombocytopenia, new onset, currently _____.



Pneumonia.



_____ of the right femoral.



Status post open reduction and internal fixation.



RECOMMENDATIONS:  _____ maintain hemoglobin above ____.



_____ lower extremities.



Elevated D-dimer _____ the patient is having active clotting, which will

be evaluated with duplex _____.



_____.  Coumadin was discontinued.  Considered _____ Coumadin tomorrow

evening if there is no contraindication of _____ IVC filter.  _____ placed

on approximately two to three months ago and should be removed with _____

the patient is now being.



We will discuss _____.



Continue nutritional support.



Followup Renal as well as Cardiology and Infectious Disease

recommendations.



Discussed with staff.



Consider also ______ in regards to restarting Coumadin.



Thank you, Dr. Milligan, for this kind referral.  Please do not

hesitate to contact me with any further questions.









  ______________________________________________

  Daniel Sierra M.D.





DR:  Cezar

D:  12/29/2016 23:58

T:  12/30/2016 05:17

JOB#:  [id number]

CC: In an effort to ensure that our patients [employer], a Team Member has reviewed your chart and identified an opportunity to provide the best care possible. An attempt was made to discuss or schedule due or overdue Preventive or Chronic Condition care.    The Outcome was Contact was not made, left message. We are attempting to schedule a pap smear, colonoscopy. If you have any questions or need help with scheduling, contact your primary care provider.. Care Gaps identified: Cervical Cancer Screening and Colorectal Cancer Screening.    Appointment needed: